# Patient Record
Sex: FEMALE | Race: OTHER
[De-identification: names, ages, dates, MRNs, and addresses within clinical notes are randomized per-mention and may not be internally consistent; named-entity substitution may affect disease eponyms.]

---

## 2018-07-18 ENCOUNTER — RECORD ABSTRACTING (OUTPATIENT)
Age: 53
End: 2018-07-18

## 2018-07-18 DIAGNOSIS — M71.20 SYNOVIAL CYST OF POPLITEAL SPACE [BAKER], UNSPECIFIED KNEE: ICD-10-CM

## 2018-07-18 DIAGNOSIS — M79.671 PAIN IN RIGHT FOOT: ICD-10-CM

## 2018-07-18 DIAGNOSIS — M51.26 OTHER INTERVERTEBRAL DISC DISPLACEMENT, LUMBAR REGION: ICD-10-CM

## 2018-07-18 DIAGNOSIS — M79.89 OTHER SPECIFIED SOFT TISSUE DISORDERS: ICD-10-CM

## 2018-07-18 DIAGNOSIS — Z82.3 FAMILY HISTORY OF STROKE: ICD-10-CM

## 2018-07-18 DIAGNOSIS — Z87.898 PERSONAL HISTORY OF OTHER SPECIFIED CONDITIONS: ICD-10-CM

## 2018-07-18 DIAGNOSIS — M51.36 OTHER INTERVERTEBRAL DISC DEGENERATION, LUMBAR REGION: ICD-10-CM

## 2018-07-18 DIAGNOSIS — S83.241A OTHER TEAR OF MEDIAL MENISCUS, CURRENT INJURY, RIGHT KNEE, INITIAL ENCOUNTER: ICD-10-CM

## 2018-07-18 DIAGNOSIS — R92.1 MAMMOGRAPHIC CALCIFICATION FOUND ON DIAGNOSTIC IMAGING OF BREAST: ICD-10-CM

## 2018-07-18 DIAGNOSIS — Z83.3 FAMILY HISTORY OF DIABETES MELLITUS: ICD-10-CM

## 2018-07-18 DIAGNOSIS — K64.8 OTHER HEMORRHOIDS: ICD-10-CM

## 2018-08-07 ENCOUNTER — APPOINTMENT (OUTPATIENT)
Dept: INTERNAL MEDICINE | Facility: CLINIC | Age: 53
End: 2018-08-07
Payer: COMMERCIAL

## 2018-08-07 ENCOUNTER — NON-APPOINTMENT (OUTPATIENT)
Age: 53
End: 2018-08-07

## 2018-08-07 VITALS
HEART RATE: 71 BPM | RESPIRATION RATE: 17 BRPM | WEIGHT: 192 LBS | TEMPERATURE: 97.9 F | HEIGHT: 65 IN | BODY MASS INDEX: 31.99 KG/M2 | DIASTOLIC BLOOD PRESSURE: 79 MMHG | SYSTOLIC BLOOD PRESSURE: 109 MMHG

## 2018-08-07 DIAGNOSIS — R73.03 PREDIABETES.: ICD-10-CM

## 2018-08-07 DIAGNOSIS — Z78.9 OTHER SPECIFIED HEALTH STATUS: ICD-10-CM

## 2018-08-07 DIAGNOSIS — R53.83 OTHER FATIGUE: ICD-10-CM

## 2018-08-07 PROCEDURE — 93000 ELECTROCARDIOGRAM COMPLETE: CPT

## 2018-08-07 PROCEDURE — 36415 COLL VENOUS BLD VENIPUNCTURE: CPT

## 2018-08-07 PROCEDURE — 99396 PREV VISIT EST AGE 40-64: CPT | Mod: 25

## 2018-08-07 NOTE — HISTORY OF PRESENT ILLNESS
[FreeTextEntry1] : Complete physical examination blood work [de-identified] : Patient presents to the office today for complete physical exam and fasting blood work\par She is feeling well overall. Her only complaint is that she feels that she is going through menopause has recently seen a GYN.\par Patient is doing very well with exercise 5 times a week and diet has been fair with 3 portion daily

## 2018-08-07 NOTE — PHYSICAL EXAM
[No Acute Distress] : no acute distress [Well Nourished] : well nourished [Normal Sclera/Conjunctiva] : normal sclera/conjunctiva [PERRL] : pupils equal round and reactive to light [EOMI] : extraocular movements intact [Normal Outer Ear/Nose] : the outer ears and nose were normal in appearance [Normal Oropharynx] : the oropharynx was normal [Normal TMs] : both tympanic membranes were normal [No JVD] : no jugular venous distention [Supple] : supple [No Lymphadenopathy] : no lymphadenopathy [No Respiratory Distress] : no respiratory distress  [Clear to Auscultation] : lungs were clear to auscultation bilaterally [No Accessory Muscle Use] : no accessory muscle use [Normal Rate] : normal rate  [Regular Rhythm] : with a regular rhythm [Normal S1, S2] : normal S1 and S2 [No Carotid Bruits] : no carotid bruits [No Varicosities] : no varicosities [Soft] : abdomen soft [Non Tender] : non-tender [Non-distended] : non-distended [No Masses] : no abdominal mass palpated [Normal Bowel Sounds] : normal bowel sounds [Normal Posterior Cervical Nodes] : no posterior cervical lymphadenopathy [Normal Anterior Cervical Nodes] : no anterior cervical lymphadenopathy [No CVA Tenderness] : no CVA  tenderness [No Joint Swelling] : no joint swelling [Grossly Normal Strength/Tone] : grossly normal strength/tone [No Rash] : no rash [Normal Gait] : normal gait [Normal Affect] : the affect was normal [Normal Insight/Judgement] : insight and judgment were intact

## 2018-08-09 LAB
25(OH)D3 SERPL-MCNC: 42.2 NG/ML
ALBUMIN SERPL ELPH-MCNC: 4.9 G/DL
ALP BLD-CCNC: 76 U/L
ALT SERPL-CCNC: 14 U/L
ANION GAP SERPL CALC-SCNC: 17 MMOL/L
APPEARANCE: CLEAR
AST SERPL-CCNC: 18 U/L
BACTERIA: NEGATIVE
BASOPHILS # BLD AUTO: 0.02 K/UL
BASOPHILS NFR BLD AUTO: 0.2 %
BILIRUB SERPL-MCNC: 0.4 MG/DL
BILIRUBIN URINE: NEGATIVE
BLOOD URINE: NEGATIVE
BUN SERPL-MCNC: 11 MG/DL
CALCIUM SERPL-MCNC: 9.6 MG/DL
CHLORIDE SERPL-SCNC: 103 MMOL/L
CHOLEST SERPL-MCNC: 177 MG/DL
CHOLEST/HDLC SERPL: 3 RATIO
CO2 SERPL-SCNC: 21 MMOL/L
COLOR: YELLOW
CREAT SERPL-MCNC: 0.7 MG/DL
EOSINOPHIL # BLD AUTO: 0.24 K/UL
EOSINOPHIL NFR BLD AUTO: 2.6 %
FERRITIN SERPL-MCNC: 138 NG/ML
GLUCOSE QUALITATIVE U: NEGATIVE MG/DL
GLUCOSE SERPL-MCNC: 96 MG/DL
HBA1C MFR BLD HPLC: 5.5 %
HCT VFR BLD CALC: 41.3 %
HDLC SERPL-MCNC: 59 MG/DL
HGB BLD-MCNC: 13.4 G/DL
HYALINE CASTS: 2 /LPF
IMM GRANULOCYTES NFR BLD AUTO: 0.3 %
IRON SATN MFR SERPL: 22 %
IRON SERPL-MCNC: 67 UG/DL
KETONES URINE: NEGATIVE
LDLC SERPL CALC-MCNC: 80 MG/DL
LEUKOCYTE ESTERASE URINE: NEGATIVE
LYMPHOCYTES # BLD AUTO: 2.26 K/UL
LYMPHOCYTES NFR BLD AUTO: 24.4 %
MAN DIFF?: NORMAL
MCHC RBC-ENTMCNC: 29.5 PG
MCHC RBC-ENTMCNC: 32.4 GM/DL
MCV RBC AUTO: 90.8 FL
MICROSCOPIC-UA: NORMAL
MONOCYTES # BLD AUTO: 0.88 K/UL
MONOCYTES NFR BLD AUTO: 9.5 %
NEUTROPHILS # BLD AUTO: 5.84 K/UL
NEUTROPHILS NFR BLD AUTO: 63 %
NITRITE URINE: NEGATIVE
PH URINE: 6
PLATELET # BLD AUTO: 326 K/UL
POTASSIUM SERPL-SCNC: 4.3 MMOL/L
PROT SERPL-MCNC: 7 G/DL
PROTEIN URINE: NEGATIVE MG/DL
RBC # BLD: 4.55 M/UL
RBC # FLD: 13.4 %
RED BLOOD CELLS URINE: 2 /HPF
SAVE SPECIMEN: NORMAL
SODIUM SERPL-SCNC: 141 MMOL/L
SPECIFIC GRAVITY URINE: 1
SQUAMOUS EPITHELIAL CELLS: 3 /HPF
TIBC SERPL-MCNC: 306 UG/DL
TRIGL SERPL-MCNC: 189 MG/DL
TSH SERPL-ACNC: 3.54 UIU/ML
UIBC SERPL-MCNC: 239 UG/DL
UROBILINOGEN URINE: NEGATIVE MG/DL
VIT B12 SERPL-MCNC: 875 PG/ML
WBC # FLD AUTO: 9.27 K/UL
WHITE BLOOD CELLS URINE: 1 /HPF

## 2019-02-18 ENCOUNTER — APPOINTMENT (OUTPATIENT)
Dept: INTERNAL MEDICINE | Facility: CLINIC | Age: 54
End: 2019-02-18
Payer: COMMERCIAL

## 2019-02-18 VITALS
HEIGHT: 65 IN | TEMPERATURE: 97.2 F | BODY MASS INDEX: 32.49 KG/M2 | DIASTOLIC BLOOD PRESSURE: 70 MMHG | WEIGHT: 195 LBS | SYSTOLIC BLOOD PRESSURE: 110 MMHG

## 2019-02-18 DIAGNOSIS — R53.81 CHRONIC FATIGUE, UNSPECIFIED: ICD-10-CM

## 2019-02-18 DIAGNOSIS — Z88.9 ALLERGY STATUS TO UNSPECIFIED DRUGS, MEDICAMENTS AND BIOLOGICAL SUBSTANCES: ICD-10-CM

## 2019-02-18 DIAGNOSIS — R53.82 CHRONIC FATIGUE, UNSPECIFIED: ICD-10-CM

## 2019-02-18 PROCEDURE — 99214 OFFICE O/P EST MOD 30 MIN: CPT | Mod: 25

## 2019-02-18 PROCEDURE — 36415 COLL VENOUS BLD VENIPUNCTURE: CPT

## 2019-02-18 NOTE — HEALTH RISK ASSESSMENT
[Patient reported PAP Smear was normal] : Patient reported PAP Smear was normal [PapSmearDate] : 08/18

## 2019-02-18 NOTE — HISTORY OF PRESENT ILLNESS
[FreeTextEntry8] : 54 y/o female present with extreme tiredness and fatigue. \par Pt has been with excessive fatigue for 2 months.  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  Plenty of fluids\par LMP was 10/18\par Pt is not under any stress no anxiety or depression feels happy just very tired all the time

## 2019-02-22 ENCOUNTER — LABORATORY RESULT (OUTPATIENT)
Age: 54
End: 2019-02-22

## 2019-02-22 ENCOUNTER — TRANSCRIPTION ENCOUNTER (OUTPATIENT)
Age: 54
End: 2019-02-22

## 2019-02-22 PROBLEM — Z88.9 MULTIPLE ALLERGIES: Status: ACTIVE | Noted: 2019-02-22

## 2019-02-22 LAB
25(OH)D3 SERPL-MCNC: 29.6 NG/ML
ALBUMIN SERPL ELPH-MCNC: 4.6 G/DL
ALP BLD-CCNC: 80 U/L
ALT SERPL-CCNC: 13 U/L
ANA SER IF-ACNC: NEGATIVE
ANION GAP SERPL CALC-SCNC: 21 MMOL/L
AST SERPL-CCNC: 15 U/L
BASOPHILS # BLD AUTO: 0.04 K/UL
BASOPHILS NFR BLD AUTO: 0.4 %
BILIRUB SERPL-MCNC: <0.2 MG/DL
BUN SERPL-MCNC: 16 MG/DL
CALCIUM SERPL-MCNC: 9.7 MG/DL
CHLORIDE SERPL-SCNC: 106 MMOL/L
CO2 SERPL-SCNC: 19 MMOL/L
CREAT SERPL-MCNC: 0.92 MG/DL
EOSINOPHIL # BLD AUTO: 0.23 K/UL
EOSINOPHIL NFR BLD AUTO: 2.3 %
FERRITIN SERPL-MCNC: 124 NG/ML
GLUCOSE SERPL-MCNC: 121 MG/DL
HBA1C MFR BLD HPLC: 5.2 %
HCT VFR BLD CALC: 39.7 %
HGB BLD-MCNC: 13.4 G/DL
IMM GRANULOCYTES NFR BLD AUTO: 0.5 %
IRON SATN MFR SERPL: 24 %
IRON SERPL-MCNC: 70 UG/DL
LYMPHOCYTES # BLD AUTO: 2.29 K/UL
LYMPHOCYTES NFR BLD AUTO: 22.8 %
MAN DIFF?: NORMAL
MCHC RBC-ENTMCNC: 29.8 PG
MCHC RBC-ENTMCNC: 33.8 GM/DL
MCV RBC AUTO: 88.4 FL
MONOCYTES # BLD AUTO: 0.92 K/UL
MONOCYTES NFR BLD AUTO: 9.2 %
NEUTROPHILS # BLD AUTO: 6.52 K/UL
NEUTROPHILS NFR BLD AUTO: 64.8 %
PLATELET # BLD AUTO: 315 K/UL
POTASSIUM SERPL-SCNC: 4.3 MMOL/L
PROT SERPL-MCNC: 6.9 G/DL
RBC # BLD: 4.49 M/UL
RBC # FLD: 12 %
SODIUM SERPL-SCNC: 146 MMOL/L
TIBC SERPL-MCNC: 288 UG/DL
TOTAL IGE SMQN RAST: 61 KU/L
TSH SERPL-ACNC: 2.86 UIU/ML
UIBC SERPL-MCNC: 218 UG/DL
VIT B12 SERPL-MCNC: 705 PG/ML
WBC # FLD AUTO: 10.05 K/UL

## 2019-02-28 ENCOUNTER — TRANSCRIPTION ENCOUNTER (OUTPATIENT)
Age: 54
End: 2019-02-28

## 2019-03-01 ENCOUNTER — TRANSCRIPTION ENCOUNTER (OUTPATIENT)
Age: 54
End: 2019-03-01

## 2019-03-12 ENCOUNTER — TRANSCRIPTION ENCOUNTER (OUTPATIENT)
Age: 54
End: 2019-03-12

## 2019-03-12 LAB
A ALTERNATA IGE QN: NORMAL
A FUMIGATUS IGE QN: <0.1 KUA/L
BERMUDA GRASS IGE QN: <0.1 KUA/L
BOXELDER IGE QN: <0.1 KUA/L
C HERBARUM IGE QN: <0.1 KUA/L
CALIF WALNUT IGE QN: NORMAL
CAT DANDER IGE QN: 0.85 KUA/L
CMN PIGWEED IGE QN: <0.1 KUA/L
COMMON RAGWEED IGE QN: <0.1 KUA/L
COTTONWOOD IGE QN: <0.1 KUA/L
D FARINAE IGE QN: <0.1 KUA/L
D PTERONYSS IGE QN: <0.1 KUA/L
DEPRECATED A ALTERNATA IGE RAST QL: NORMAL
DEPRECATED A FUMIGATUS IGE RAST QL: 0
DEPRECATED BERMUDA GRASS IGE RAST QL: 0
DEPRECATED BOXELDER IGE RAST QL: 0
DEPRECATED C HERBARUM IGE RAST QL: 0
DEPRECATED CAT DANDER IGE RAST QL: 2
DEPRECATED COMMON PIGWEED IGE RAST QL: 0
DEPRECATED COMMON RAGWEED IGE RAST QL: 0
DEPRECATED COTTONWOOD IGE RAST QL: 0
DEPRECATED D FARINAE IGE RAST QL: 0
DEPRECATED D PTERONYSS IGE RAST QL: 0
DEPRECATED DOG DANDER IGE RAST QL: NORMAL
DEPRECATED GOOSEFOOT IGE RAST QL: 0
DEPRECATED LONDON PLANE IGE RAST QL: NORMAL
DEPRECATED MUGWORT IGE RAST QL: NORMAL
DEPRECATED P NOTATUM IGE RAST QL: 0
DEPRECATED RED CEDAR IGE RAST QL: NORMAL
DEPRECATED ROACH IGE RAST QL: 0
DEPRECATED SHEEP SORREL IGE RAST QL: NORMAL
DEPRECATED SILVER BIRCH IGE RAST QL: 0
DEPRECATED TIMOTHY IGE RAST QL: NORMAL
DEPRECATED WHITE ASH IGE RAST QL: NORMAL
DEPRECATED WHITE OAK IGE RAST QL: NORMAL
DOG DANDER IGE QN: 0.22 KUA/L
GOOSEFOOT IGE QN: <0.1 KUA/L
LONDON PLANE IGE QN: NORMAL
MUGWORT IGE QN: NORMAL
MULBERRY (T70) CLASS: NORMAL
MULBERRY (T70) CONC: NORMAL
P NOTATUM IGE QN: <0.1 KUA/L
RED CEDAR IGE QN: NORMAL
ROACH IGE QN: <0.1 KUA/L
SHEEP SORREL IGE QN: NORMAL
SILVER BIRCH IGE QN: <0.1 KUA/L
TIMOTHY IGE QN: NORMAL
TREE ALLERG MIX1 IGE QL: NORMAL
WHITE ASH IGE QN: NORMAL
WHITE ELM IGE QN: 0
WHITE ELM IGE QN: <0.1 KUA/L
WHITE OAK IGE QN: NORMAL

## 2019-05-31 DIAGNOSIS — Z01.84 ENCOUNTER FOR ANTIBODY RESPONSE EXAMINATION: ICD-10-CM

## 2019-06-04 ENCOUNTER — APPOINTMENT (OUTPATIENT)
Dept: INTERNAL MEDICINE | Facility: CLINIC | Age: 54
End: 2019-06-04
Payer: COMMERCIAL

## 2019-06-04 VITALS
HEIGHT: 65 IN | BODY MASS INDEX: 31.65 KG/M2 | WEIGHT: 190 LBS | DIASTOLIC BLOOD PRESSURE: 80 MMHG | SYSTOLIC BLOOD PRESSURE: 100 MMHG | TEMPERATURE: 97.8 F

## 2019-06-04 DIAGNOSIS — Z23 ENCOUNTER FOR IMMUNIZATION: ICD-10-CM

## 2019-06-04 PROCEDURE — 99214 OFFICE O/P EST MOD 30 MIN: CPT | Mod: 25

## 2019-06-04 PROCEDURE — 90471 IMMUNIZATION ADMIN: CPT

## 2019-06-04 PROCEDURE — 90715 TDAP VACCINE 7 YRS/> IM: CPT

## 2019-06-04 PROCEDURE — 36415 COLL VENOUS BLD VENIPUNCTURE: CPT

## 2019-06-04 RX ORDER — BROMPHENIRAMINE MALEATE, PSEUDOEPHEDRINE HYDROCHLORIDE, 2; 30; 10 MG/5ML; MG/5ML; MG/5ML
30-2-10 SYRUP ORAL
Qty: 200 | Refills: 0 | Status: COMPLETED | COMMUNITY
Start: 2019-03-27

## 2019-06-04 NOTE — HISTORY OF PRESENT ILLNESS
[FreeTextEntry1] : 52 y/o female present for form completion.  [de-identified] : Pt presents to the office today for paper work consult as she is starting a new job.  She needs proof of MMR Varicella and Hep B.\par TB gold   She also is due for Tdap vaccination.\par She feels well overall no complaints

## 2019-06-05 ENCOUNTER — TRANSCRIPTION ENCOUNTER (OUTPATIENT)
Age: 54
End: 2019-06-05

## 2019-06-06 LAB
HBV SURFACE AB SER QL: REACTIVE
MEV IGG FLD QL IA: >300 AU/ML
MEV IGG+IGM SER-IMP: POSITIVE
MUV AB SER-ACNC: POSITIVE
MUV IGG SER QL IA: >300 AU/ML
RUBV IGG FLD-ACNC: 6.4 INDEX
RUBV IGG SER-IMP: POSITIVE
VZV AB TITR SER: POSITIVE
VZV IGG SER IF-ACNC: 601.2 INDEX

## 2019-06-10 ENCOUNTER — TRANSCRIPTION ENCOUNTER (OUTPATIENT)
Age: 54
End: 2019-06-10

## 2019-06-10 LAB
M TB IFN-G BLD-IMP: NEGATIVE
QUANTIFERON TB PLUS MITOGEN MINUS NIL: 1.25 IU/ML
QUANTIFERON TB PLUS NIL: 0.01 IU/ML
QUANTIFERON TB PLUS TB1 MINUS NIL: 0 IU/ML
QUANTIFERON TB PLUS TB2 MINUS NIL: 0 IU/ML

## 2019-08-26 ENCOUNTER — NON-APPOINTMENT (OUTPATIENT)
Age: 54
End: 2019-08-26

## 2019-08-26 ENCOUNTER — APPOINTMENT (OUTPATIENT)
Dept: INTERNAL MEDICINE | Facility: CLINIC | Age: 54
End: 2019-08-26
Payer: COMMERCIAL

## 2019-08-26 VITALS
WEIGHT: 197 LBS | DIASTOLIC BLOOD PRESSURE: 80 MMHG | TEMPERATURE: 97.4 F | OXYGEN SATURATION: 98 % | HEIGHT: 65 IN | SYSTOLIC BLOOD PRESSURE: 110 MMHG | BODY MASS INDEX: 32.82 KG/M2 | HEART RATE: 81 BPM

## 2019-08-26 PROCEDURE — 36415 COLL VENOUS BLD VENIPUNCTURE: CPT

## 2019-08-26 PROCEDURE — 99396 PREV VISIT EST AGE 40-64: CPT | Mod: 25

## 2019-08-26 PROCEDURE — 93000 ELECTROCARDIOGRAM COMPLETE: CPT

## 2019-08-26 RX ORDER — OSELTAMIVIR PHOSPHATE 75 MG/1
75 CAPSULE ORAL
Qty: 10 | Refills: 0 | Status: COMPLETED | COMMUNITY
Start: 2019-03-27 | End: 2019-08-26

## 2019-08-26 NOTE — HEALTH RISK ASSESSMENT
[Patient reported mammogram was normal] : Patient reported mammogram was normal [MammogramDate] : 11/23/19

## 2019-08-26 NOTE — PHYSICAL EXAM
[No Acute Distress] : no acute distress [Well Nourished] : well nourished [Well Developed] : well developed [Normal Sclera/Conjunctiva] : normal sclera/conjunctiva [Well-Appearing] : well-appearing [EOMI] : extraocular movements intact [PERRL] : pupils equal round and reactive to light [Normal Outer Ear/Nose] : the outer ears and nose were normal in appearance [No JVD] : no jugular venous distention [Normal Oropharynx] : the oropharynx was normal [Supple] : supple [No Lymphadenopathy] : no lymphadenopathy [Thyroid Normal, No Nodules] : the thyroid was normal and there were no nodules present [No Respiratory Distress] : no respiratory distress  [Clear to Auscultation] : lungs were clear to auscultation bilaterally [No Accessory Muscle Use] : no accessory muscle use [Regular Rhythm] : with a regular rhythm [Normal Rate] : normal rate  [No Murmur] : no murmur heard [Normal S1, S2] : normal S1 and S2 [No Carotid Bruits] : no carotid bruits [No Abdominal Bruit] : a ~M bruit was not heard ~T in the abdomen [Pedal Pulses Present] : the pedal pulses are present [No Varicosities] : no varicosities [No Edema] : there was no peripheral edema [No Palpable Aorta] : no palpable aorta [No Extremity Clubbing/Cyanosis] : no extremity clubbing/cyanosis [Soft] : abdomen soft [Non Tender] : non-tender [Non-distended] : non-distended [No HSM] : no HSM [No Masses] : no abdominal mass palpated [Normal Posterior Cervical Nodes] : no posterior cervical lymphadenopathy [Normal Bowel Sounds] : normal bowel sounds [Normal Anterior Cervical Nodes] : no anterior cervical lymphadenopathy [No CVA Tenderness] : no CVA  tenderness [No Joint Swelling] : no joint swelling [No Spinal Tenderness] : no spinal tenderness [Grossly Normal Strength/Tone] : grossly normal strength/tone [Coordination Grossly Intact] : coordination grossly intact [No Rash] : no rash [No Focal Deficits] : no focal deficits [Normal Gait] : normal gait [Deep Tendon Reflexes (DTR)] : deep tendon reflexes were 2+ and symmetric [Normal Insight/Judgement] : insight and judgment were intact [Normal Affect] : the affect was normal

## 2019-08-26 NOTE — PLAN
[FreeTextEntry1] : FBW done today in office\par EKG done no changes from prior EKG SR @ 75 BPM non specific ST changes \par Further improve diet and continue exercise

## 2019-08-26 NOTE — HISTORY OF PRESENT ILLNESS
[FreeTextEntry1] : 52 y/o female present for a physical exam.  [de-identified] : Pt presents to the office today for CPE and FBW\par She has been feeling well overall\par She is with heel pain on the left that she has been seeing ortho for\par Diet has been fair\par Exercise doing well 5 days a week.

## 2019-08-27 ENCOUNTER — TRANSCRIPTION ENCOUNTER (OUTPATIENT)
Age: 54
End: 2019-08-27

## 2019-08-30 ENCOUNTER — TRANSCRIPTION ENCOUNTER (OUTPATIENT)
Age: 54
End: 2019-08-30

## 2019-08-30 DIAGNOSIS — R82.90 UNSPECIFIED ABNORMAL FINDINGS IN URINE: ICD-10-CM

## 2019-08-30 LAB
ALBUMIN SERPL ELPH-MCNC: 4.9 G/DL
ALP BLD-CCNC: 73 U/L
ALT SERPL-CCNC: 18 U/L
ANION GAP SERPL CALC-SCNC: 14 MMOL/L
APPEARANCE: ABNORMAL
AST SERPL-CCNC: 16 U/L
BACTERIA: NEGATIVE
BASOPHILS # BLD AUTO: 0.04 K/UL
BASOPHILS NFR BLD AUTO: 0.4 %
BILIRUB SERPL-MCNC: 0.4 MG/DL
BILIRUBIN URINE: NEGATIVE
BLOOD URINE: NEGATIVE
BUN SERPL-MCNC: 12 MG/DL
CALCIUM SERPL-MCNC: 9.6 MG/DL
CHLORIDE SERPL-SCNC: 102 MMOL/L
CHOLEST SERPL-MCNC: 187 MG/DL
CHOLEST/HDLC SERPL: 3.1 RATIO
CO2 SERPL-SCNC: 22 MMOL/L
COLOR: NORMAL
CREAT SERPL-MCNC: 0.74 MG/DL
EOSINOPHIL # BLD AUTO: 0.2 K/UL
EOSINOPHIL NFR BLD AUTO: 2.2 %
ESTIMATED AVERAGE GLUCOSE: 111 MG/DL
GLUCOSE QUALITATIVE U: NEGATIVE
GLUCOSE SERPL-MCNC: 93 MG/DL
HBA1C MFR BLD HPLC: 5.5 %
HCT VFR BLD CALC: 40.9 %
HDLC SERPL-MCNC: 61 MG/DL
HGB BLD-MCNC: 13.4 G/DL
HYALINE CASTS: 0 /LPF
IMM GRANULOCYTES NFR BLD AUTO: 0.2 %
KETONES URINE: NEGATIVE
LDLC SERPL CALC-MCNC: 97 MG/DL
LEUKOCYTE ESTERASE URINE: ABNORMAL
LYMPHOCYTES # BLD AUTO: 2.22 K/UL
LYMPHOCYTES NFR BLD AUTO: 24 %
MAN DIFF?: NORMAL
MCHC RBC-ENTMCNC: 29.3 PG
MCHC RBC-ENTMCNC: 32.8 GM/DL
MCV RBC AUTO: 89.5 FL
MICROSCOPIC-UA: NORMAL
MONOCYTES # BLD AUTO: 0.9 K/UL
MONOCYTES NFR BLD AUTO: 9.7 %
NEUTROPHILS # BLD AUTO: 5.88 K/UL
NEUTROPHILS NFR BLD AUTO: 63.5 %
NITRITE URINE: NEGATIVE
PH URINE: 5.5
PLATELET # BLD AUTO: 320 K/UL
POTASSIUM SERPL-SCNC: 4.1 MMOL/L
PROT SERPL-MCNC: 7.2 G/DL
PROTEIN URINE: NEGATIVE
RBC # BLD: 4.57 M/UL
RBC # FLD: 12.2 %
RED BLOOD CELLS URINE: 2 /HPF
SODIUM SERPL-SCNC: 138 MMOL/L
SPECIFIC GRAVITY URINE: 1.01
SQUAMOUS EPITHELIAL CELLS: 9 /HPF
TRIGL SERPL-MCNC: 143 MG/DL
TSH SERPL-ACNC: 3.02 UIU/ML
URINE COMMENTS: NORMAL
UROBILINOGEN URINE: NORMAL
WBC # FLD AUTO: 9.26 K/UL
WHITE BLOOD CELLS URINE: 12 /HPF

## 2019-10-23 ENCOUNTER — APPOINTMENT (OUTPATIENT)
Dept: ORTHOPEDIC SURGERY | Facility: CLINIC | Age: 54
End: 2019-10-23
Payer: COMMERCIAL

## 2019-10-23 PROCEDURE — 99204 OFFICE O/P NEW MOD 45 MIN: CPT

## 2019-10-23 NOTE — ADDENDUM
[FreeTextEntry1] : I, Dariusz Ai, acted solely as a scribe for Dr. Ezekiel Yoo on this date 10/23/2019 .\par All medical record entries made by the Scribe were at my, Dr. Ezekiel Yoo, direction and personally dictated by me on 10/23/2019 . I have reviewed the chart and agree that the record accurately reflects my personal performance of the history, physical exam, assessment and plan. I have also personally directed, reviewed, and agreed with the chart.\par \par \par

## 2019-10-23 NOTE — DISCUSSION/SUMMARY
[de-identified] : Today I had a lengthy discussion with the patient regarding their left foot pain.I have addressed all the patient's concerns surrounding the pathology of their condition. I advised the patient to continue physical therapy and stretch. A discussion was had about a possible PRP injection. I would like to see the patient back in the office PRN to reassess their condition. The patient understood and verbally agreed to the treatment plan. All of their questions were answered and they were satisfied with the visit. The patient should call the office if they have any questions or experience worsening symptoms.\par \par \par

## 2019-10-23 NOTE — PHYSICAL EXAM
[de-identified] : General: Alert and oriented x3. In no acute distress. Pleasant in nature with a normal affect. No apparent respiratory distress.\par \par L Foot Exam\par Skin: Clean, dry, intact\par Inspection: No obvious malalignment, no masses, no swelling, no effusion\par Pulses: 2+ DP/PT pulses\par ROM: FOOT Full ROM of digits, ANKLE 10 degrees of dorsiflexion, 40 degrees of plantarflexion, 10 degrees of subtalar motion.\par Painful ROM: None\par Tenderness: +distal Achilles pain, +insertional Achilles pain. No tenderness over the medial malleolus, No tenderness over the lateral malleolus, no CFL/ATFL/PTFL pain, no deltoid ligament pain. No heel pain. No Achilles tenderness. No 5th metatarsal pain. No pain to the LisFranc joint. No ttp over the posterior tibial tendon.\par Stability: Negative anterior/posterior drawer.\par Strength: 5/5 ADD/ABD/TA/GS/EHL/FHL/EDL\par Neuro: Sensation in tact to light touch throughout\par Additional tests: Negative Mortons test, negative tarsal tunnel tinels, negative single heel rise.  [de-identified] : An MRI was obtained of left ankle from an outside facility on 10/7/19 and reviewed by me today 10/23/2019  in the office. Revealed: \par 1. Moderate diffuse Achilles tendinopathy with bursitis and mild insertional marrow edema distally without tendon discontinuity.\par 2. Moderate grade partial tearing of the peroneal brevis tendon over an area measuring 3 to 4 cm posterior and inferior to the lateral malleolus with moderate tenosynovitis.\par 3. Peroneal longus tendinopathy/tenosynovitis and anterior tibialis tendinopathy and tenosynovitis.\par 4. Proximal plantar fascial thickening without tear.

## 2019-10-23 NOTE — CONSULT LETTER
[Consult Letter:] : I had the pleasure of evaluating your patient, [unfilled]. [Please see my note below.] : Please see my note below. [Consult Closing:] : Thank you very much for allowing me to participate in the care of this patient.  If you have any questions, please do not hesitate to contact me. [Sincerely,] : Sincerely, [FreeTextEntry3] : Ezekiel Yoo, DO\par Foot and Ankle Surgery\par

## 2019-12-02 ENCOUNTER — OTHER (OUTPATIENT)
Age: 54
End: 2019-12-02

## 2020-04-23 ENCOUNTER — APPOINTMENT (OUTPATIENT)
Dept: INTERNAL MEDICINE | Facility: CLINIC | Age: 55
End: 2020-04-23
Payer: COMMERCIAL

## 2020-04-23 DIAGNOSIS — B02.9 ZOSTER W/OUT COMPLICATIONS: ICD-10-CM

## 2020-04-23 PROCEDURE — 99213 OFFICE O/P EST LOW 20 MIN: CPT | Mod: 95

## 2020-04-23 RX ORDER — DOXYCYCLINE 100 MG/1
100 CAPSULE ORAL
Qty: 14 | Refills: 0 | Status: DISCONTINUED | COMMUNITY
Start: 2019-11-20

## 2020-04-23 RX ORDER — FLUTICASONE PROPIONATE 50 UG/1
50 SPRAY, METERED NASAL
Qty: 16 | Refills: 0 | Status: DISCONTINUED | COMMUNITY
Start: 2019-11-20

## 2020-04-23 NOTE — HISTORY OF PRESENT ILLNESS
[Home] : at home, [unfilled] , at the time of the visit. [Medical Office: ([facility] [place])___] : at the medical office located in  [Patient] : the patient [Self] : self [Spouse] : spouse [FreeTextEntry2] : Paula Adams [FreeTextEntry8] : Patient comes for an acute telehealth visit. \par \par She is here for evaluation of new rash on right lower back x 2 days. First noticed on Tuesday. Area is not itchy but is mildly uncomfortable. The rash is small and has not spread. She has associated numbness in area. She has pain in RLQ but no rash in area. She denies fever, N/V, diarrhea, or change in appetite. She is concerned of appendicitis. No history of herpes zoster. \par

## 2020-04-23 NOTE — PLAN
[FreeTextEntry1] : Unclear if rash is possibly due to HSV or herpes zoster.\par Will cover for both with Valtrex 1 g TID x 1 week.\par Start Gabapentin 300 mg at bedtime, discussed risk of sedation with medication.\par Monitor size of rash for now. She will keep me updated through patient portal.\par She would be good candidate for Shingrix once rash resolves.\par Call office PRN.

## 2020-04-23 NOTE — PHYSICAL EXAM
[No Acute Distress] : no acute distress [Well Developed] : well developed [Well-Appearing] : well-appearing [No Respiratory Distress] : no respiratory distress  [de-identified] : friendly [de-identified] : small area of erythematous grouped papular-appearing rash on right lower back

## 2020-04-30 RX ORDER — GABAPENTIN 300 MG/1
300 CAPSULE ORAL
Qty: 30 | Refills: 1 | Status: DISCONTINUED | COMMUNITY
Start: 2020-04-23 | End: 2020-04-30

## 2020-07-05 ENCOUNTER — LABORATORY RESULT (OUTPATIENT)
Age: 55
End: 2020-07-05

## 2020-07-06 ENCOUNTER — APPOINTMENT (OUTPATIENT)
Dept: GASTROENTEROLOGY | Facility: CLINIC | Age: 55
End: 2020-07-06
Payer: COMMERCIAL

## 2020-07-06 VITALS
OXYGEN SATURATION: 96 % | SYSTOLIC BLOOD PRESSURE: 114 MMHG | BODY MASS INDEX: 31.49 KG/M2 | WEIGHT: 189 LBS | DIASTOLIC BLOOD PRESSURE: 65 MMHG | HEART RATE: 81 BPM | HEIGHT: 65 IN

## 2020-07-06 DIAGNOSIS — R11.0 NAUSEA: ICD-10-CM

## 2020-07-06 PROCEDURE — 99203 OFFICE O/P NEW LOW 30 MIN: CPT

## 2020-07-06 RX ORDER — GINGER ROOT/GINGER ROOT EXT 262.5 MG
CAPSULE ORAL
Refills: 0 | Status: DISCONTINUED | COMMUNITY
End: 2020-07-06

## 2020-07-06 RX ORDER — DICLOFENAC SODIUM 75 MG/1
75 TABLET, DELAYED RELEASE ORAL
Qty: 60 | Refills: 0 | Status: DISCONTINUED | COMMUNITY
Start: 2020-02-28 | End: 2020-07-06

## 2020-07-06 RX ORDER — DICLOFENAC SODIUM 10 MG/G
1 GEL TOPICAL
Qty: 100 | Refills: 0 | Status: DISCONTINUED | COMMUNITY
Start: 2020-02-28 | End: 2020-07-06

## 2020-07-06 RX ORDER — VALACYCLOVIR 1 G/1
1 TABLET, FILM COATED ORAL 3 TIMES DAILY
Qty: 21 | Refills: 0 | Status: DISCONTINUED | COMMUNITY
Start: 2020-04-23 | End: 2020-07-06

## 2020-07-06 RX ORDER — TURMERIC/TURMERIC EXT/PEPR EXT 900-100 MG
CAPSULE ORAL
Refills: 0 | Status: DISCONTINUED | COMMUNITY
End: 2020-07-06

## 2020-07-06 RX ORDER — TRIAMCINOLONE ACETONIDE 1 MG/G
0.1 CREAM TOPICAL TWICE DAILY
Qty: 1 | Refills: 0 | Status: DISCONTINUED | COMMUNITY
Start: 2020-04-24 | End: 2020-07-06

## 2020-07-06 RX ORDER — ADHESIVE TAPE 3"X 2.3 YD
50 MCG TAPE, NON-MEDICATED TOPICAL DAILY
Refills: 0 | Status: DISCONTINUED | COMMUNITY
Start: 2018-07-18 | End: 2020-07-06

## 2020-07-06 NOTE — PHYSICAL EXAM
[General Appearance - Well Developed] : well developed [Sclera] : the sclera and conjunctiva were normal [General Appearance - Well Nourished] : well nourished [Neck Appearance] : the appearance of the neck was normal [Respiration, Rhythm And Depth] : normal respiratory rhythm and effort [Auscultation Breath Sounds / Voice Sounds] : lungs were clear to auscultation bilaterally [Apical Impulse] : the apical impulse was normal [Heart Rate And Rhythm] : heart rate was normal and rhythm regular [Heart Sounds] : normal S1 and S2 [Bowel Sounds] : normal bowel sounds [Abdomen Soft] : soft [Abdomen Mass (___ Cm)] : no abdominal mass palpated [Abdomen Tenderness] : non-tender [] : no hepato-splenomegaly [Cervical Lymph Nodes Enlarged Posterior Bilaterally] : posterior cervical [Cervical Lymph Nodes Enlarged Anterior Bilaterally] : anterior cervical [Supraclavicular Lymph Nodes Enlarged Bilaterally] : supraclavicular [Skin Color & Pigmentation] : normal skin color and pigmentation [Musculoskeletal - Swelling] : no joint swelling seen [Oriented To Time, Place, And Person] : oriented to person, place, and time [Impaired Insight] : insight and judgment were intact [Nasal Cavity] : the nasal mucosa and septum were normal [Thyroid Diffuse Enlargement] : the thyroid was not enlarged [Oropharynx] : the oropharynx was normal [Murmurs] : no murmurs

## 2020-07-07 ENCOUNTER — NON-APPOINTMENT (OUTPATIENT)
Age: 55
End: 2020-07-07

## 2020-07-09 LAB
BACTERIA STL CULT: NORMAL
C DIFF TOXIN B QL PCR REFLEX: NORMAL
GDH ANTIGEN: DETECTED
TOXIN A AND B: NOT DETECTED

## 2020-07-13 ENCOUNTER — TRANSCRIPTION ENCOUNTER (OUTPATIENT)
Age: 55
End: 2020-07-13

## 2020-07-24 DIAGNOSIS — Z01.818 ENCOUNTER FOR OTHER PREPROCEDURAL EXAMINATION: ICD-10-CM

## 2020-07-26 ENCOUNTER — APPOINTMENT (OUTPATIENT)
Dept: DISASTER EMERGENCY | Facility: CLINIC | Age: 55
End: 2020-07-26

## 2020-07-27 LAB — SARS-COV-2 N GENE NPH QL NAA+PROBE: NOT DETECTED

## 2020-07-29 ENCOUNTER — RESULT REVIEW (OUTPATIENT)
Age: 55
End: 2020-07-29

## 2020-07-29 ENCOUNTER — APPOINTMENT (OUTPATIENT)
Dept: GASTROENTEROLOGY | Facility: AMBULATORY MEDICAL SERVICES | Age: 55
End: 2020-07-29
Payer: COMMERCIAL

## 2020-07-29 PROCEDURE — 45380 COLONOSCOPY AND BIOPSY: CPT

## 2020-07-29 NOTE — REVIEW OF SYSTEMS
[As Noted in HPI] : as noted in HPI [Feeling Poorly] : feeling poorly [Feeling Tired] : feeling tired [Diarrhea] : diarrhea [Negative] : Neurological [Chills] : no chills [Fever] : no fever

## 2020-07-29 NOTE — HISTORY OF PRESENT ILLNESS
[Ordering Test(s) ___] : ordering [unfilled] [None] : had no significant interval events [Heartburn] : denies heartburn [Nausea] : improved nausea [Vomiting] : resolved vomiting [Diarrhea] : diarrhea worsened [Constipation] : denies constipation [Yellow Skin Or Eyes (Jaundice)] : denies jaundice [Abdominal Pain] : improved abdominal pain [Abdominal Swelling] : abdominal swelling resolved [Rectal Pain] : denies rectal pain [GERD] : gastroesophageal reflux disease [de-identified] : 5 years ago [de-identified] : Paula is a pleasant 54 year old female with diarrhea. Acute onset of symptoms about 2 weeks ago. no fevers. no sick contacts. She has been exclusively home with her  and sons. has not eaten out, traveled or even leave her home. no one else has been sick. She did start "Green" shakes over 4 weeks ago which contain tumeric. no other meds or supplements. She does take occasional NSAIDs (Ibuprofen about once a month for pain). no prior diarrhea or GI illness. normal colonoscopy about 5 years ago. reports 2 days of nausea and bloating, followed by explosive diarrhea. this has been followed by watery diarrhea 7 to 9 times a day. starts shortly after she wakes up. worse after eating. no change in diet. No bleeding. no weight loss. has been hydrating and following a BRAT diet. Diffuse abdominal pain and intense bloating has not recurred but she does have lower abdominal cramping pain preceding BMs. [de-identified] : PBC\par  [de-identified] : dietary modifications and OTC remedies (she obtained PeptoBismol and asks for advise on use)

## 2020-07-31 ENCOUNTER — NON-APPOINTMENT (OUTPATIENT)
Age: 55
End: 2020-07-31

## 2020-08-11 ENCOUNTER — APPOINTMENT (OUTPATIENT)
Dept: GASTROENTEROLOGY | Facility: CLINIC | Age: 55
End: 2020-08-11
Payer: COMMERCIAL

## 2020-08-11 VITALS
OXYGEN SATURATION: 98 % | HEIGHT: 65 IN | BODY MASS INDEX: 31.49 KG/M2 | HEART RATE: 95 BPM | DIASTOLIC BLOOD PRESSURE: 87 MMHG | SYSTOLIC BLOOD PRESSURE: 151 MMHG | WEIGHT: 189 LBS

## 2020-08-11 DIAGNOSIS — R10.9 UNSPECIFIED ABDOMINAL PAIN: ICD-10-CM

## 2020-08-11 PROCEDURE — 99215 OFFICE O/P EST HI 40 MIN: CPT

## 2020-08-11 NOTE — HISTORY OF PRESENT ILLNESS
[de-identified] : Juanis presents for follow up for diarrhea. She was here about 5 weeks ago and has since had stool testing and colonoscopy. \par July 6, 2020:\par Paula is a pleasant 54 year old female with diarrhea. Acute onset of symptoms about 2 weeks ago. no fevers. no sick contacts. She has been exclusively home with her  and sons. has not eaten out, traveled or even leave her home. no one else has been sick. She did start "Green" shakes over 4 weeks ago which contain tumeric. no other meds or supplements. She does take occasional NSAIDs (Ibuprofen about once a month for pain). no prior diarrhea or GI illness. normal colonoscopy about 5 years ago. reports 2 days of nausea and bloating, followed by explosive diarrhea. this has been followed by watery diarrhea 7 to 9 times a day. starts shortly after she wakes up. worse after eating. no change in diet. No bleeding. no weight loss. has been hydrating and following a BRAT diet. Diffuse abdominal pain and intense bloating has not recurred but she does have lower abdominal cramping pain preceding BMs. \par Symptoms: denies heartburn, improved nausea, resolved vomiting, diarrhea worsened, denies constipation, denies jaundice, improved abdominal pain, abdominal swelling resolved and denies rectal pain. \par \par Juanis reports that for the past 2 weeks, she has had less fecal urgency and stool is more formed. she has not resumed her normal bowel routine (one formed BM every morning). she has stopped using NSAIDs and now will only take Tylenol for joint pains or headaches. chronic foot pain, gets cortisone injections, last one 10/19. colonoscopy with random biopsies was normal (negative for microscopic colitis). She reports that her weight is stable and she has maintained good hydration.

## 2020-08-11 NOTE — PHYSICAL EXAM
[General Appearance - Well Nourished] : well nourished [General Appearance - Well Developed] : well developed [Heart Rate And Rhythm] : heart rate was normal and rhythm regular [] : no respiratory distress [Sclera] : the sclera and conjunctiva were normal [Bowel Sounds] : normal bowel sounds [Heart Sounds] : normal S1 and S2 [Abdomen Soft] : soft [Abdomen Tenderness] : non-tender

## 2020-08-11 NOTE — ASSESSMENT
[FreeTextEntry1] : I discussed with Paula in detail the results of her tests as well as her symptoms. 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 She has ongoing bloating and soft stool but overall is better. she is advised on diet changes to help with her symptoms and to avoid supplements/ vitamins that will exacerbate symptoms. She will complete Celiac serology with her next set of blood work at her CPE and follow up with me if any ongoing or recurrent symptoms.

## 2020-08-30 ENCOUNTER — RESULT CHARGE (OUTPATIENT)
Age: 55
End: 2020-08-30

## 2020-08-31 ENCOUNTER — APPOINTMENT (OUTPATIENT)
Dept: INTERNAL MEDICINE | Facility: CLINIC | Age: 55
End: 2020-08-31
Payer: COMMERCIAL

## 2020-08-31 ENCOUNTER — LABORATORY RESULT (OUTPATIENT)
Age: 55
End: 2020-08-31

## 2020-08-31 ENCOUNTER — NON-APPOINTMENT (OUTPATIENT)
Age: 55
End: 2020-08-31

## 2020-08-31 VITALS
SYSTOLIC BLOOD PRESSURE: 134 MMHG | HEART RATE: 87 BPM | DIASTOLIC BLOOD PRESSURE: 70 MMHG | TEMPERATURE: 98.7 F | OXYGEN SATURATION: 98 % | HEIGHT: 65 IN | BODY MASS INDEX: 31.99 KG/M2 | RESPIRATION RATE: 18 BRPM | WEIGHT: 192 LBS

## 2020-08-31 DIAGNOSIS — Z11.1 ENCOUNTER FOR SCREENING FOR RESPIRATORY TUBERCULOSIS: ICD-10-CM

## 2020-08-31 PROCEDURE — 99396 PREV VISIT EST AGE 40-64: CPT | Mod: 25

## 2020-08-31 PROCEDURE — 36415 COLL VENOUS BLD VENIPUNCTURE: CPT

## 2020-08-31 PROCEDURE — 93000 ELECTROCARDIOGRAM COMPLETE: CPT

## 2020-08-31 RX ORDER — SODIUM SULFATE, POTASSIUM SULFATE, MAGNESIUM SULFATE 17.5; 3.13; 1.6 G/ML; G/ML; G/ML
17.5-3.13-1.6 SOLUTION, CONCENTRATE ORAL
Qty: 1 | Refills: 0 | Status: COMPLETED | COMMUNITY
Start: 2020-07-14 | End: 2020-08-31

## 2020-08-31 NOTE — HISTORY OF PRESENT ILLNESS
[FreeTextEntry1] : 53 y/o female present for Annual physical exam  [de-identified] : Patient presents the office today for complete physical exam and fasting blood work\par Patient has been seeing GI as she is having issues with diarrhea\par Diarrhea has currently subsided\par Diet and exercise are fair

## 2020-08-31 NOTE — PLAN
[FreeTextEntry1] : Fasting blood work done in office today\par EKG sinus rhythm at 79 bpm no changes from prior EKG\par Patient refused influenza vaccination

## 2020-09-08 ENCOUNTER — TRANSCRIPTION ENCOUNTER (OUTPATIENT)
Age: 55
End: 2020-09-08

## 2020-09-08 LAB
25(OH)D3 SERPL-MCNC: 38.1 NG/ML
ALBUMIN SERPL ELPH-MCNC: 5.1 G/DL
ALP BLD-CCNC: 83 U/L
ALT SERPL-CCNC: 19 U/L
ANION GAP SERPL CALC-SCNC: 15 MMOL/L
APPEARANCE: ABNORMAL
AST SERPL-CCNC: 23 U/L
BACTERIA: NEGATIVE
BASOPHILS # BLD AUTO: 0.04 K/UL
BASOPHILS NFR BLD AUTO: 0.4 %
BILIRUB SERPL-MCNC: 0.5 MG/DL
BILIRUBIN URINE: NEGATIVE
BLOOD URINE: NEGATIVE
BUN SERPL-MCNC: 12 MG/DL
CALCIUM SERPL-MCNC: 9.7 MG/DL
CHLORIDE SERPL-SCNC: 102 MMOL/L
CHOLEST SERPL-MCNC: 238 MG/DL
CHOLEST/HDLC SERPL: 3.4 RATIO
CO2 SERPL-SCNC: 21 MMOL/L
COLOR: NORMAL
CREAT SERPL-MCNC: 0.75 MG/DL
EOSINOPHIL # BLD AUTO: 0.18 K/UL
EOSINOPHIL NFR BLD AUTO: 2 %
ESTIMATED AVERAGE GLUCOSE: 105 MG/DL
FERRITIN SERPL-MCNC: 207 NG/ML
FOLATE SERPL-MCNC: >20 NG/ML
GLUCOSE QUALITATIVE U: NEGATIVE
GLUCOSE SERPL-MCNC: 94 MG/DL
HBA1C MFR BLD HPLC: 5.3 %
HCT VFR BLD CALC: 44.4 %
HDLC SERPL-MCNC: 70 MG/DL
HGB BLD-MCNC: 14.1 G/DL
HYALINE CASTS: 3 /LPF
IMM GRANULOCYTES NFR BLD AUTO: 0.4 %
IRON SATN MFR SERPL: 24 %
IRON SERPL-MCNC: 80 UG/DL
KETONES URINE: NEGATIVE
LDLC SERPL CALC-MCNC: 134 MG/DL
LEUKOCYTE ESTERASE URINE: ABNORMAL
LYMPHOCYTES # BLD AUTO: 2.02 K/UL
LYMPHOCYTES NFR BLD AUTO: 21.9 %
M TB IFN-G BLD-IMP: NEGATIVE
MAN DIFF?: NORMAL
MCHC RBC-ENTMCNC: 29.5 PG
MCHC RBC-ENTMCNC: 31.8 GM/DL
MCV RBC AUTO: 92.9 FL
MICROSCOPIC-UA: NORMAL
MONOCYTES # BLD AUTO: 0.7 K/UL
MONOCYTES NFR BLD AUTO: 7.6 %
NEUTROPHILS # BLD AUTO: 6.23 K/UL
NEUTROPHILS NFR BLD AUTO: 67.7 %
NITRITE URINE: NEGATIVE
PH URINE: 5.5
PLATELET # BLD AUTO: 323 K/UL
POTASSIUM SERPL-SCNC: 4.2 MMOL/L
PROT SERPL-MCNC: 7.5 G/DL
PROTEIN URINE: NEGATIVE
QUANTIFERON TB PLUS MITOGEN MINUS NIL: 7.83 IU/ML
QUANTIFERON TB PLUS NIL: 0.01 IU/ML
QUANTIFERON TB PLUS TB1 MINUS NIL: 0 IU/ML
QUANTIFERON TB PLUS TB2 MINUS NIL: 0 IU/ML
RBC # BLD: 4.78 M/UL
RBC # FLD: 11.9 %
RED BLOOD CELLS URINE: 2 /HPF
SODIUM SERPL-SCNC: 138 MMOL/L
SPECIFIC GRAVITY URINE: 1.01
SQUAMOUS EPITHELIAL CELLS: 15 /HPF
T3FREE SERPL-MCNC: 3.08 PG/ML
T4 FREE SERPL-MCNC: 1.3 NG/DL
THYROGLOB AB SERPL-ACNC: <20 IU/ML
THYROPEROXIDASE AB SERPL IA-ACNC: <10 IU/ML
TIBC SERPL-MCNC: 337 UG/DL
TRIGL SERPL-MCNC: 170 MG/DL
TSH SERPL-ACNC: 2.25 UIU/ML
UIBC SERPL-MCNC: 257 UG/DL
UROBILINOGEN URINE: NORMAL
VIT B12 SERPL-MCNC: 681 PG/ML
WBC # FLD AUTO: 9.21 K/UL
WHITE BLOOD CELLS URINE: 3 /HPF

## 2021-06-03 ENCOUNTER — NON-APPOINTMENT (OUTPATIENT)
Age: 56
End: 2021-06-03

## 2021-07-01 ENCOUNTER — LABORATORY RESULT (OUTPATIENT)
Age: 56
End: 2021-07-01

## 2021-07-01 ENCOUNTER — APPOINTMENT (OUTPATIENT)
Dept: INTERNAL MEDICINE | Facility: CLINIC | Age: 56
End: 2021-07-01
Payer: COMMERCIAL

## 2021-07-01 ENCOUNTER — NON-APPOINTMENT (OUTPATIENT)
Age: 56
End: 2021-07-01

## 2021-07-01 VITALS
TEMPERATURE: 98 F | HEIGHT: 65 IN | WEIGHT: 182 LBS | SYSTOLIC BLOOD PRESSURE: 120 MMHG | HEART RATE: 87 BPM | BODY MASS INDEX: 30.32 KG/M2 | OXYGEN SATURATION: 98 % | DIASTOLIC BLOOD PRESSURE: 80 MMHG

## 2021-07-01 DIAGNOSIS — L30.9 DERMATITIS, UNSPECIFIED: ICD-10-CM

## 2021-07-01 DIAGNOSIS — Z01.84 ENCOUNTER FOR ANTIBODY RESPONSE EXAMINATION: ICD-10-CM

## 2021-07-01 PROCEDURE — 93000 ELECTROCARDIOGRAM COMPLETE: CPT

## 2021-07-01 PROCEDURE — 99396 PREV VISIT EST AGE 40-64: CPT | Mod: 25

## 2021-07-01 PROCEDURE — 99072 ADDL SUPL MATRL&STAF TM PHE: CPT

## 2021-07-01 PROCEDURE — 36415 COLL VENOUS BLD VENIPUNCTURE: CPT

## 2021-07-01 NOTE — PHYSICAL EXAM
[No Acute Distress] : no acute distress [Well Nourished] : well nourished [Well Developed] : well developed [Well-Appearing] : well-appearing [Normal Sclera/Conjunctiva] : normal sclera/conjunctiva [PERRL] : pupils equal round and reactive to light [Normal Outer Ear/Nose] : the outer ears and nose were normal in appearance [EOMI] : extraocular movements intact [Normal Oropharynx] : the oropharynx was normal [No JVD] : no jugular venous distention [No Lymphadenopathy] : no lymphadenopathy [Supple] : supple [No Respiratory Distress] : no respiratory distress  [Thyroid Normal, No Nodules] : the thyroid was normal and there were no nodules present [No Accessory Muscle Use] : no accessory muscle use [Clear to Auscultation] : lungs were clear to auscultation bilaterally [Normal Rate] : normal rate  [Regular Rhythm] : with a regular rhythm [No Murmur] : no murmur heard [Normal S1, S2] : normal S1 and S2 [No Carotid Bruits] : no carotid bruits [No Abdominal Bruit] : a ~M bruit was not heard ~T in the abdomen [No Varicosities] : no varicosities [Pedal Pulses Present] : the pedal pulses are present [No Edema] : there was no peripheral edema [No Palpable Aorta] : no palpable aorta [No Extremity Clubbing/Cyanosis] : no extremity clubbing/cyanosis [Soft] : abdomen soft [Non Tender] : non-tender [Non-distended] : non-distended [No Masses] : no abdominal mass palpated [No HSM] : no HSM [Normal Bowel Sounds] : normal bowel sounds [Normal Posterior Cervical Nodes] : no posterior cervical lymphadenopathy [Normal Anterior Cervical Nodes] : no anterior cervical lymphadenopathy [No CVA Tenderness] : no CVA  tenderness [No Spinal Tenderness] : no spinal tenderness [No Joint Swelling] : no joint swelling [Grossly Normal Strength/Tone] : grossly normal strength/tone [Coordination Grossly Intact] : coordination grossly intact [No Focal Deficits] : no focal deficits [Normal Gait] : normal gait [Normal Affect] : the affect was normal [Normal Insight/Judgement] : insight and judgment were intact [de-identified] : Right foot.  Mild erythematous raised rash with dryness

## 2021-07-01 NOTE — PLAN
[FreeTextEntry1] : Further improvement with diet and exercise needed\par EKG NSR @ 84 BPM no changes from prior EKG\par FBW done in office today \par Will request pts mammo results \par Paper work filled out for pts job

## 2021-07-01 NOTE — HISTORY OF PRESENT ILLNESS
[FreeTextEntry1] : 56 y/o female presents in the office for a complete physical exam / fasting. [de-identified] : Pt presents to the office today for CPE and FBW\par She has been feeling well overall with no complaints \par Diet has been fair\par She has recently seen her GYN\par

## 2021-07-07 ENCOUNTER — TRANSCRIPTION ENCOUNTER (OUTPATIENT)
Age: 56
End: 2021-07-07

## 2021-07-07 LAB
25(OH)D3 SERPL-MCNC: 79.1 NG/ML
ALBUMIN SERPL ELPH-MCNC: 5.1 G/DL
ALP BLD-CCNC: 77 U/L
ALT SERPL-CCNC: 21 U/L
ANION GAP SERPL CALC-SCNC: 15 MMOL/L
APPEARANCE: CLEAR
AST SERPL-CCNC: 20 U/L
BACTERIA: NEGATIVE
BASOPHILS # BLD AUTO: 0.04 K/UL
BASOPHILS NFR BLD AUTO: 0.6 %
BILIRUB SERPL-MCNC: 0.5 MG/DL
BILIRUBIN URINE: NEGATIVE
BLOOD URINE: NEGATIVE
BUN SERPL-MCNC: 14 MG/DL
CALCIUM SERPL-MCNC: 10.3 MG/DL
CHLORIDE SERPL-SCNC: 103 MMOL/L
CHOLEST SERPL-MCNC: 213 MG/DL
CO2 SERPL-SCNC: 22 MMOL/L
COLOR: YELLOW
CREAT SERPL-MCNC: 0.76 MG/DL
EOSINOPHIL # BLD AUTO: 0.28 K/UL
EOSINOPHIL NFR BLD AUTO: 3.9 %
ESTIMATED AVERAGE GLUCOSE: 114 MG/DL
GLUCOSE QUALITATIVE U: NEGATIVE
GLUCOSE SERPL-MCNC: 97 MG/DL
HBA1C MFR BLD HPLC: 5.6 %
HCT VFR BLD CALC: 41.7 %
HDLC SERPL-MCNC: 74 MG/DL
HGB BLD-MCNC: 13.1 G/DL
HYALINE CASTS: 1 /LPF
IMM GRANULOCYTES NFR BLD AUTO: 0.4 %
KETONES URINE: ABNORMAL
LDLC SERPL CALC-MCNC: 120 MG/DL
LEUKOCYTE ESTERASE URINE: NEGATIVE
LYMPHOCYTES # BLD AUTO: 1.95 K/UL
LYMPHOCYTES NFR BLD AUTO: 27.3 %
M TB IFN-G BLD-IMP: NEGATIVE
MAN DIFF?: NORMAL
MCHC RBC-ENTMCNC: 28.2 PG
MCHC RBC-ENTMCNC: 31.4 GM/DL
MCV RBC AUTO: 89.7 FL
MICROSCOPIC-UA: NORMAL
MONOCYTES # BLD AUTO: 0.69 K/UL
MONOCYTES NFR BLD AUTO: 9.7 %
NEUTROPHILS # BLD AUTO: 4.16 K/UL
NEUTROPHILS NFR BLD AUTO: 58.1 %
NITRITE URINE: NEGATIVE
NONHDLC SERPL-MCNC: 139 MG/DL
PH URINE: 5.5
PLATELET # BLD AUTO: 308 K/UL
POTASSIUM SERPL-SCNC: 4.5 MMOL/L
PROT SERPL-MCNC: 7 G/DL
PROTEIN URINE: NORMAL
QUANTIFERON TB PLUS MITOGEN MINUS NIL: 5.85 IU/ML
QUANTIFERON TB PLUS NIL: 0.02 IU/ML
QUANTIFERON TB PLUS TB1 MINUS NIL: 0 IU/ML
QUANTIFERON TB PLUS TB2 MINUS NIL: 0 IU/ML
RBC # BLD: 4.65 M/UL
RBC # FLD: 12.8 %
RED BLOOD CELLS URINE: 4 /HPF
SODIUM SERPL-SCNC: 140 MMOL/L
SPECIFIC GRAVITY URINE: 1.03
SQUAMOUS EPITHELIAL CELLS: 2 /HPF
TRIGL SERPL-MCNC: 94 MG/DL
TSH SERPL-ACNC: 1.81 UIU/ML
UROBILINOGEN URINE: NORMAL
VIT B12 SERPL-MCNC: 632 PG/ML
WBC # FLD AUTO: 7.15 K/UL
WHITE BLOOD CELLS URINE: 4 /HPF

## 2022-04-06 ENCOUNTER — APPOINTMENT (OUTPATIENT)
Dept: ORTHOPEDIC SURGERY | Facility: CLINIC | Age: 57
End: 2022-04-06
Payer: COMMERCIAL

## 2022-04-06 VITALS — WEIGHT: 182 LBS | BODY MASS INDEX: 30.32 KG/M2 | HEIGHT: 65 IN

## 2022-04-06 PROCEDURE — 99212 OFFICE O/P EST SF 10 MIN: CPT | Mod: 25

## 2022-04-06 NOTE — PROCEDURE
[Large Joint Injection] : Large joint injection [Right] : of the right [Knee] : knee [Euflexxa] : Euflexxa [#3] : series #3 [] : Patient tolerated procedure well [Call if redness, pain or fever occur] : call if redness, pain or fever occur [Apply ice for 15min out of every hour for the next 12-24 hours as tolerated] : apply ice for 15 minutes out of every hour for the next 12-24 hours as tolerated [Patient was advised to rest the joint(s) for ____ days] : patient was advised to rest the joint(s) for [unfilled] days [Patient had decreased mobility in the joint] : patient had decreased mobility in the joint [Risks, benefits, alternatives discussed / Verbal consent obtained] : the risks benefits, and alternatives have been discussed, and verbal consent was obtained

## 2022-04-07 ENCOUNTER — APPOINTMENT (OUTPATIENT)
Dept: ORTHOPEDIC SURGERY | Facility: CLINIC | Age: 57
End: 2022-04-07
Payer: OTHER MISCELLANEOUS

## 2022-04-07 PROCEDURE — 99212 OFFICE O/P EST SF 10 MIN: CPT | Mod: 25

## 2022-04-07 PROCEDURE — 20610 DRAIN/INJ JOINT/BURSA W/O US: CPT

## 2022-04-07 PROCEDURE — 99072 ADDL SUPL MATRL&STAF TM PHE: CPT

## 2022-04-07 NOTE — DISCUSSION/SUMMARY
[de-identified] : The risks, benefits, contents and alternatives to injection were explained in full to the patient.  Risks outlined include but are not limited to infection, sepsis, bleeding, scarring, skin discoloration, temporary increase in pain, syncopal episode, failure to resolve symptoms, allergic reaction, flare reaction, permanent white skin discoloration, symptom recurrence, and elevation of blood sugar in diabetics.  Patient understood the risks.  All questions were answered.  After discussion of options, patient requested an injection.  Oral informed consent was obtained and sterile prep was done of the injection site.  Sterile technique was used to introduce the mixture.  Patient tolerated the procedure well.  Patient advised to ice the injection site this evening.  Signs and symptoms of infection reviewed and patient advised to call immediately for redness, fevers, and/or chills.\par \par The patient was instructed on the importance of ice and elevation of the extremity to decrease swelling and pain. \par \par Progress note completed by Elizabeth Springer PA-C.

## 2022-04-07 NOTE — PROCEDURE
[Large Joint Injection] : Large joint injection [Left] : of the left [Knee] : knee [Pain] : pain [Inflammation] : inflammation [X-ray evidence of Osteoarthritis on this or prior visit] : x-ray evidence of Osteoarthritis on this or prior visit [Alcohol] : alcohol [Betadine] : betadine [Ethyl Chloride sprayed topically] : ethyl chloride sprayed topically [Sterile technique used] : sterile technique used [Euflexxa] : Euflexxa [#3] : series #3 [] : Patient tolerated procedure well [Call if redness, pain or fever occur] : call if redness, pain or fever occur [Apply ice for 15min out of every hour for the next 12-24 hours as tolerated] : apply ice for 15 minutes out of every hour for the next 12-24 hours as tolerated [Patient was advised to rest the joint(s) for ____ days] : patient was advised to rest the joint(s) for [unfilled] days [Previous OTC use and PT nontherapeutic] : patient has tried OTC's including aspirin, Ibuprofen, Aleve, etc or prescription NSAIDS, and/or exercises at home and/or physical therapy without satisfactory response [Risks, benefits, alternatives discussed / Verbal consent obtained] : the risks benefits, and alternatives have been discussed, and verbal consent was obtained [de-identified] : Lot: [id number] Exp: 2/15/23

## 2022-04-07 NOTE — ASSESSMENT
[FreeTextEntry1] : 3/23/22: She tolerated the injection well today. She is advised ice and rest. Activities as tolerated tomorrow. f/u in 1 week.\par \par 3/30/22: R knee Euflexxa #2. Tolerated well. Post inj instructions. FU 1 week.\par \par 4/6/22: 3rd euflexxa r knee. \par \par The documentation recorded by the scribe accurately reflects the service I personally performed and the decisions made by me.\par \par I, Daron Peña, attest that this documentation has been prepared under the direction and in the presence of Provider Lauro Ba MD.\par \par

## 2022-04-07 NOTE — PHYSICAL EXAM
[Right] : right knee [NL (0)] : extension 0 degrees [] : no effusion [TWNoteComboBox7] : flexion 120 degrees

## 2022-04-07 NOTE — HISTORY OF PRESENT ILLNESS
[Left Leg] : left leg [3] : 3 [Euflexxa] : Euflexxa [de-identified] : 03/09/2022: 55YO female patient present for left knee pain. f/u 30 from WC  3/31/22: L knee euflexxa #2.\par \par 4/7/22: Here for Euflexxa #3 L knee  [] : no [FreeTextEntry1] : knee [de-identified] : 3.31.22 [TWNoteComboBox1] : 50%

## 2022-04-07 NOTE — HISTORY OF PRESENT ILLNESS
[3] : 3 [Euflexxa] : Euflexxa [de-identified] : Last Note\par \par 3/30/22: 2nd euflexxa R knee \par \par 3/23/22: Euflexxa #1 right knee\par \par 03/16/2022: 55YO female patient present for right knee pain. MDP beneficial for the right knee.  [] : no [de-identified] : 3.30.22 rt knee [TWNoteComboBox1] : 20%

## 2022-06-01 DIAGNOSIS — Z11.1 ENCOUNTER FOR SCREENING FOR RESPIRATORY TUBERCULOSIS: ICD-10-CM

## 2022-07-07 ENCOUNTER — APPOINTMENT (OUTPATIENT)
Dept: INTERNAL MEDICINE | Facility: CLINIC | Age: 57
End: 2022-07-07

## 2022-07-07 ENCOUNTER — NON-APPOINTMENT (OUTPATIENT)
Age: 57
End: 2022-07-07

## 2022-07-07 VITALS
WEIGHT: 192 LBS | DIASTOLIC BLOOD PRESSURE: 70 MMHG | SYSTOLIC BLOOD PRESSURE: 110 MMHG | HEIGHT: 65 IN | OXYGEN SATURATION: 98 % | TEMPERATURE: 98.3 F | HEART RATE: 87 BPM | BODY MASS INDEX: 31.99 KG/M2

## 2022-07-07 DIAGNOSIS — R82.90 UNSPECIFIED ABNORMAL FINDINGS IN URINE: ICD-10-CM

## 2022-07-07 DIAGNOSIS — E66.3 OVERWEIGHT: ICD-10-CM

## 2022-07-07 LAB
25(OH)D3 SERPL-MCNC: 58.6 NG/ML
ALBUMIN SERPL ELPH-MCNC: 4.5 G/DL
ALP BLD-CCNC: 81 U/L
ALT SERPL-CCNC: 16 U/L
ANION GAP SERPL CALC-SCNC: 13 MMOL/L
APPEARANCE: CLEAR
AST SERPL-CCNC: 17 U/L
BACTERIA: NEGATIVE
BASOPHILS # BLD AUTO: 0.04 K/UL
BASOPHILS NFR BLD AUTO: 0.5 %
BILIRUB SERPL-MCNC: 0.4 MG/DL
BILIRUBIN URINE: NEGATIVE
BLOOD URINE: ABNORMAL
BUN SERPL-MCNC: 15 MG/DL
CALCIUM SERPL-MCNC: 9.8 MG/DL
CHLORIDE SERPL-SCNC: 105 MMOL/L
CHOLEST SERPL-MCNC: 228 MG/DL
CO2 SERPL-SCNC: 23 MMOL/L
COLOR: YELLOW
CREAT SERPL-MCNC: 0.71 MG/DL
EGFR: 100 ML/MIN/1.73M2
EOSINOPHIL # BLD AUTO: 0.3 K/UL
EOSINOPHIL NFR BLD AUTO: 4 %
ESTIMATED AVERAGE GLUCOSE: 120 MG/DL
GLUCOSE QUALITATIVE U: NEGATIVE
GLUCOSE SERPL-MCNC: 98 MG/DL
HBA1C MFR BLD HPLC: 5.8 %
HCT VFR BLD CALC: 39.9 %
HDLC SERPL-MCNC: 65 MG/DL
HGB BLD-MCNC: 13.1 G/DL
HYALINE CASTS: 5 /LPF
IMM GRANULOCYTES NFR BLD AUTO: 0.1 %
KETONES URINE: NEGATIVE
LDLC SERPL CALC-MCNC: 136 MG/DL
LEUKOCYTE ESTERASE URINE: ABNORMAL
LYMPHOCYTES # BLD AUTO: 2.22 K/UL
LYMPHOCYTES NFR BLD AUTO: 29.2 %
MAN DIFF?: NORMAL
MCHC RBC-ENTMCNC: 29.2 PG
MCHC RBC-ENTMCNC: 32.8 GM/DL
MCV RBC AUTO: 88.9 FL
MICROSCOPIC-UA: NORMAL
MONOCYTES # BLD AUTO: 0.68 K/UL
MONOCYTES NFR BLD AUTO: 9 %
NEUTROPHILS # BLD AUTO: 4.34 K/UL
NEUTROPHILS NFR BLD AUTO: 57.2 %
NITRITE URINE: NEGATIVE
NONHDLC SERPL-MCNC: 163 MG/DL
PH URINE: 5.5
PLATELET # BLD AUTO: 311 K/UL
POTASSIUM SERPL-SCNC: 4.1 MMOL/L
PROT SERPL-MCNC: 6.9 G/DL
PROTEIN URINE: NORMAL
RBC # BLD: 4.49 M/UL
RBC # FLD: 12.5 %
RED BLOOD CELLS URINE: 3 /HPF
SODIUM SERPL-SCNC: 141 MMOL/L
SPECIFIC GRAVITY URINE: 1.02
SQUAMOUS EPITHELIAL CELLS: 11 /HPF
TRIGL SERPL-MCNC: 136 MG/DL
TSH SERPL-ACNC: 2.06 UIU/ML
UROBILINOGEN URINE: NORMAL
VIT B12 SERPL-MCNC: 579 PG/ML
WBC # FLD AUTO: 7.59 K/UL
WHITE BLOOD CELLS URINE: 15 /HPF

## 2022-07-07 PROCEDURE — 99396 PREV VISIT EST AGE 40-64: CPT | Mod: 25

## 2022-07-07 PROCEDURE — 93000 ELECTROCARDIOGRAM COMPLETE: CPT

## 2022-07-07 NOTE — HISTORY OF PRESENT ILLNESS
[FreeTextEntry1] : 55 y/o female presents to the office today for a physical exam with outside labs.  [de-identified] : Patient presents the office today for complete physical exam and review of fasting blood work\par Patient has been feeling well has been exercising often diet has been fair

## 2022-07-07 NOTE — HEALTH RISK ASSESSMENT
[Good] : ~his/her~  mood as  good [No falls in past year] : Patient reported no falls in the past year [0] : 2) Feeling down, depressed, or hopeless: Not at all (0) [PHQ-2 Negative - No further assessment needed] : PHQ-2 Negative - No further assessment needed [[id number]] : 0 [Change in mental status noted] : No change in mental status noted [Language] : denies difficulty with language [Behavior] : denies difficulty with behavior [None] : None [With Significant Other] : lives with significant other [] :  [Fully functional (bathing, dressing, toileting, transferring, walking, feeding)] : Fully functional (bathing, dressing, toileting, transferring, walking, feeding) [Fully functional (using the telephone, shopping, preparing meals, housekeeping, doing laundry, using] : Fully functional and needs no help or supervision to perform IADLs (using the telephone, shopping, preparing meals, housekeeping, doing laundry, using transportation, managing medications and managing finances) [Reports changes in hearing] : Reports no changes in hearing [Reports changes in vision] : Reports no changes in vision [Reports normal functional visual acuity (ie: able to read med bottle)] : Reports normal functional visual acuity

## 2022-07-07 NOTE — PLAN
[FreeTextEntry1] : Reviewed fasting blood work with patient further improvement with diet and exercise needed cholesterol and A1c slightly elevated\par EKG normal sinus rhythm\par Urine showed white blood cells and small blood further evaluation with repeat urine analysis and culture patient asymptomatic\par Paperwork completed for patient for work\par Follow-up in 6 months to repeat fasting blood work to check A1c and cholesterol

## 2022-07-25 LAB
APPEARANCE: CLEAR
BACTERIA UR CULT: ABNORMAL
BACTERIA: NEGATIVE
BILIRUBIN URINE: NEGATIVE
BLOOD URINE: NEGATIVE
COLOR: COLORLESS
GLUCOSE QUALITATIVE U: NEGATIVE
HYALINE CASTS: 0 /LPF
KETONES URINE: NEGATIVE
LEUKOCYTE ESTERASE URINE: ABNORMAL
MICROSCOPIC-UA: NORMAL
NITRITE URINE: NEGATIVE
PH URINE: 6
PROTEIN URINE: NEGATIVE
RED BLOOD CELLS URINE: 2 /HPF
SPECIFIC GRAVITY URINE: 1
SQUAMOUS EPITHELIAL CELLS: 2 /HPF
UROBILINOGEN URINE: NORMAL
WHITE BLOOD CELLS URINE: 5 /HPF

## 2022-09-29 ENCOUNTER — APPOINTMENT (OUTPATIENT)
Dept: ORTHOPEDIC SURGERY | Facility: CLINIC | Age: 57
End: 2022-09-29

## 2022-09-29 VITALS — HEIGHT: 65 IN | BODY MASS INDEX: 31.99 KG/M2 | WEIGHT: 192 LBS

## 2022-09-29 DIAGNOSIS — M67.88 OTHER SPECIFIED DISORDERS OF SYNOVIUM AND TENDON, OTHER SITE: ICD-10-CM

## 2022-09-29 DIAGNOSIS — M77.32 CALCANEAL SPUR, LEFT FOOT: ICD-10-CM

## 2022-09-29 PROCEDURE — 73650 X-RAY EXAM OF HEEL: CPT | Mod: LT

## 2022-09-29 PROCEDURE — 99214 OFFICE O/P EST MOD 30 MIN: CPT

## 2022-09-29 NOTE — PHYSICAL EXAM
[Left] : left foot and ankle [Mild] : mild swelling over achilles tendon [NL (40)] : plantar flexion 40 degrees [NL 30)] : inversion 30 degrees [NL (20)] : eversion 20 degrees [5___] : eversion 5[unfilled]/5 [2+] : dorsalis pedis pulse: 2+ [] : patient ambulates without assistive device

## 2022-09-29 NOTE — HISTORY OF PRESENT ILLNESS
[10] : 10 [3] : 3 [Sharp] : sharp [Throbbing] : throbbing [de-identified] : 09/29/2022:  few months posterior heel pain. no recent injury. prior injections in the area 5 years ago. no recent treatment. no dm/tob. 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

## 2022-09-29 NOTE — ASSESSMENT
[FreeTextEntry1] : wbat\par PT\par mobic\par nsaids prn\par discussed surgical treatmetn if fails to resolve\par f/up 8 wks\par \par The patient's current medication management of their orthopedic diagnosis was reviewed today:\par \par (1) We discussed a comprehensive treatment plan that included possible pharmaceutical management involving the use of prescription strength medications including but not limited to options such as oral Naprosyn 500mg BID, once daily Meloxicam 15 mg, or 500-650 mg Tylenol versus over the counter oral medications and topical prescription NSAID Pennsaid vs over the counter Voltaren gel.\par (2) There is a moderate risk of morbidity with further treatment, especially from use of prescription strength medications and possible side effects of these medications which include upset stomach with oral medications, skin reactions to topical medications and cardiac/renal issues with long term use.\par (3) I recommended that the patient follow-up with their medical physician to discuss any significant specific potential issues with long term medication use such as interactions with current medications or with exacerbation of underlying medical comorbidities.\par (4) The benefits and risks associated with use of injectable, oral or topical, prescription and over the counter anti-inflammatory medications were discussed with the patient. The patient voiced understanding of the risks including but not limited to bleeding, stroke, kidney dysfunction, heart disease, and were referred to the black box warning label for further information.\par \par

## 2022-10-03 ENCOUNTER — LABORATORY RESULT (OUTPATIENT)
Age: 57
End: 2022-10-03

## 2022-10-03 ENCOUNTER — APPOINTMENT (OUTPATIENT)
Dept: INTERNAL MEDICINE | Facility: CLINIC | Age: 57
End: 2022-10-03

## 2022-10-03 VITALS
SYSTOLIC BLOOD PRESSURE: 120 MMHG | WEIGHT: 190 LBS | HEART RATE: 89 BPM | TEMPERATURE: 98.2 F | OXYGEN SATURATION: 99 % | HEIGHT: 65 IN | DIASTOLIC BLOOD PRESSURE: 80 MMHG | BODY MASS INDEX: 31.65 KG/M2

## 2022-10-03 PROCEDURE — 36415 COLL VENOUS BLD VENIPUNCTURE: CPT

## 2022-10-03 PROCEDURE — 99214 OFFICE O/P EST MOD 30 MIN: CPT | Mod: 25

## 2022-10-03 RX ORDER — CLOBETASOL PROPIONATE 0.5 MG/G
0.05 CREAM TOPICAL
Qty: 15 | Refills: 0 | Status: COMPLETED | COMMUNITY
Start: 2022-09-29

## 2022-10-03 NOTE — HISTORY OF PRESENT ILLNESS
[FreeTextEntry8] : 56 y/o female presents to the office today with diarrhea intermittent for 7 weeks.\par  She has been suffering with intermittent episodes of diarrhea for the last 2 years that last several weeks.  She has had about 5-6 of these episodes\par She has seen GI however no improvement or diagnosis given\par Pts has been taking multiple supplements and diet has been fair to poor

## 2022-10-03 NOTE — REVIEW OF SYSTEMS
[Abdominal Pain] : no abdominal pain [Nausea] : nausea [Diarrhea] : diarrhea [Negative] : Constitutional

## 2022-10-03 NOTE — PLAN
[FreeTextEntry1] : Pt is advised to stop all supplements \par L[place]kin diet\par Pt will go for Xray to help R/O any blockages \par Possible trial of Lomotil once Xray is done\par Allergy testing started in office to see if possible cause of diarrhea since current episode started after having 2 IPAs and a cheeseburger\par Allergist consult

## 2022-10-03 NOTE — PHYSICAL EXAM
[Non Tender] : non-tender [No Masses] : no abdominal mass palpated [de-identified] : Increased BS

## 2022-10-05 ENCOUNTER — NON-APPOINTMENT (OUTPATIENT)
Age: 57
End: 2022-10-05

## 2022-10-06 ENCOUNTER — TRANSCRIPTION ENCOUNTER (OUTPATIENT)
Age: 57
End: 2022-10-06

## 2022-10-06 LAB
BARLEY IGE QN: <0.1 KUA/L
CHERRY IGE QN: <0.1 KUA/L
COW MILK IGE QN: <0.1 KUA/L
CRAB IGE QN: <0.1 KUA/L
DEPRECATED BARLEY IGE RAST QL: 0
DEPRECATED CHERRY IGE RAST QL: 0
DEPRECATED COW MILK IGE RAST QL: 0
DEPRECATED CRAB IGE RAST QL: 0
DEPRECATED EGG WHITE IGE RAST QL: 0
DEPRECATED OAT IGE RAST QL: 0
DEPRECATED PEANUT IGE RAST QL: 0
DEPRECATED RYE IGE RAST QL: 0
DEPRECATED SOYBEAN IGE RAST QL: 0
DEPRECATED WHEAT IGE RAST QL: 0
EGG WHITE IGE QN: <0.1 KUA/L
OAT IGE QN: <0.1 KUA/L
PEANUT IGE QN: <0.1 KUA/L
RYE IGE QN: <0.1 KUA/L
SOYBEAN IGE QN: <0.1 KUA/L
TOTAL IGE SMQN RAST: 58 KU/L
WHEAT IGE QN: <0.1 KUA/L

## 2022-10-12 ENCOUNTER — APPOINTMENT (OUTPATIENT)
Dept: ALLERGY | Facility: CLINIC | Age: 57
End: 2022-10-12

## 2022-10-12 VITALS
HEART RATE: 94 BPM | OXYGEN SATURATION: 98 % | HEIGHT: 65 IN | SYSTOLIC BLOOD PRESSURE: 118 MMHG | WEIGHT: 186 LBS | DIASTOLIC BLOOD PRESSURE: 76 MMHG | TEMPERATURE: 98.3 F | BODY MASS INDEX: 30.99 KG/M2

## 2022-10-12 PROCEDURE — 99203 OFFICE O/P NEW LOW 30 MIN: CPT | Mod: 25

## 2022-10-12 PROCEDURE — 95018 ALL TSTG PERQ&IQ DRUGS/BIOL: CPT

## 2022-10-12 PROCEDURE — 95004 PERQ TESTS W/ALRGNC XTRCS: CPT

## 2022-10-12 NOTE — PHYSICAL EXAM
[Alert] : alert [Well Nourished] : well nourished [Healthy Appearance] : healthy appearance [No Acute Distress] : no acute distress [Well Developed] : well developed [Normal Voice/Communication] : normal voice communication [No Neck Mass] : no neck mass was observed [No LAD] : no lymphadenopathy [Normal Rate and Effort] : normal respiratory rhythm and effort [No Crackles] : no crackles [No Retractions] : no retractions [Bilateral Audible Breath Sounds] : bilateral audible breath sounds [Wheezing] : no wheezing was heard [Normal Rate] : heart rate was normal  [Normal S1, S2] : normal S1 and S2 [Regular Rhythm] : with a regular rhythm [No Rash] : no rash [No clubbing] : no clubbing [No Cyanosis] : no cyanosis [Normal Mood] : mood was normal [Normal Affect] : affect was normal [Judgment and Insight Age Appropriate] : judgement and insight is age appropriate [Alert, Awake, Oriented as Age-Appropriate] : alert, awake, oriented as age appropriate

## 2022-10-12 NOTE — SOCIAL HISTORY
[House] : [unfilled] lives in a house  [] :  [FreeTextEntry1] : Lives with spouse and son \par PE special   [Smokers in Household] : there are no smokers in the home

## 2022-10-12 NOTE — ASSESSMENT
[FreeTextEntry1] : Diarrhea with no evidence of food allergies\par \par Will obtain stool culture\par Will obtain stool for c def toxin since her symptoms started after a course of azithromycin antibiotic therapy

## 2022-10-12 NOTE — HISTORY OF PRESENT ILLNESS
[de-identified] : Patient with ongoing diarrhea for six weeks and getting worse - loss 10 pounds - she had some food testing and the results were negative.    She has not seen GI MD as of yet   In the past with fruit or salad she would have episodic loose bowel movements.  \par \par She had COVID 4/21 - uncontrollable diarrhea.    She has now been COVID negative.    She has not had a stool culture performed.   No foreign travel. \par \par Patient had sinus infection prior to the onset of her symptoms - thinks it was azithromycin.

## 2022-10-12 NOTE — REVIEW OF SYSTEMS
[Wgt Loss (___ Lbs)] : recent [unfilled] lb weight loss [Nl] : Genitourinary [FreeTextEntry7] : watery diarrhea - 15 x per day

## 2022-10-13 ENCOUNTER — LABORATORY RESULT (OUTPATIENT)
Age: 57
End: 2022-10-13

## 2022-10-13 ENCOUNTER — NON-APPOINTMENT (OUTPATIENT)
Age: 57
End: 2022-10-13

## 2022-10-13 ENCOUNTER — APPOINTMENT (OUTPATIENT)
Dept: GASTROENTEROLOGY | Facility: CLINIC | Age: 57
End: 2022-10-13

## 2022-10-13 VITALS
BODY MASS INDEX: 30.82 KG/M2 | SYSTOLIC BLOOD PRESSURE: 110 MMHG | WEIGHT: 185 LBS | TEMPERATURE: 98.5 F | DIASTOLIC BLOOD PRESSURE: 70 MMHG | RESPIRATION RATE: 16 BRPM | HEIGHT: 65 IN | HEART RATE: 77 BPM | OXYGEN SATURATION: 99 %

## 2022-10-13 DIAGNOSIS — R14.1 GAS PAIN: ICD-10-CM

## 2022-10-13 PROCEDURE — 99215 OFFICE O/P EST HI 40 MIN: CPT

## 2022-10-13 RX ORDER — MELOXICAM 15 MG/1
15 TABLET ORAL
Qty: 30 | Refills: 1 | Status: DISCONTINUED | COMMUNITY
Start: 2022-09-29 | End: 2022-10-13

## 2022-10-13 NOTE — ASSESSMENT
[FreeTextEntry1] : Organic vs. functional diarrhea that is getting better w prior sx in the setting of negative col.\par

## 2022-10-13 NOTE — PHYSICAL EXAM
[Alert] : alert [Normal Voice/Communication] : normal voice/communication [Healthy Appearing] : healthy appearing [No Acute Distress] : no acute distress [Hearing Threshold Finger Rub Not [place]] : hearing was normal [Normal Lips/Gums] : the lips and gums were normal [Oropharynx] : the oropharynx was normal [Normal Appearance] : the appearance of the neck was normal [No Neck Mass] : no neck mass was observed [No Respiratory Distress] : no respiratory distress [No Acc Muscle Use] : no accessory muscle use [Respiration, Rhythm And Depth] : normal respiratory rhythm and effort [Auscultation Breath Sounds / Voice Sounds] : lungs were clear to auscultation bilaterally [Normal S1, S2] : normal S1 and S2 [Murmurs] : no murmurs [No Masses] : no abdominal mass palpated [Oriented To Time, Place, And Person] : oriented to person, place, and time [General Appearance - Well Nourished] : well nourished [General Appearance - Well Developed] : well developed [Sclera] : the sclera and conjunctiva were normal [] : no respiratory distress [Heart Rate And Rhythm] : heart rate was normal and rhythm regular [Heart Sounds] : normal S1 and S2 [Bowel Sounds] : normal bowel sounds [Abdomen Soft] : soft [Abdomen Tenderness] : non-tender

## 2022-10-13 NOTE — HISTORY OF PRESENT ILLNESS
[de-identified] : never [FreeTextEntry1] : 7/2020 Col with negative bx\par 2015 [de-identified] :  \par July 6, 2020:\yosef Mitchell is a pleasant 54 year old female with diarrhea. Acute onset of symptoms about 2 weeks ago. no fevers. no sick contacts. She has been exclusively home with her  and sons. has not eaten out, traveled or even leave her home. no one else has been sick. She did start "Green" shakes over 4 weeks ago which contain tumeric. no other meds or supplements. She does take occasional NSAIDs (Ibuprofen about once a month for pain). no prior diarrhea or GI illness. normal colonoscopy about 5 years ago. reports 2 days of nausea and bloating, followed by explosive diarrhea. this has been followed by watery diarrhea 7 to 9 times a day. starts shortly after she wakes up. worse after eating. no change in diet. No bleeding. no weight loss. has been hydrating and following a BRAT diet. Diffuse abdominal pain and intense bloating has not recurred but she does have lower abdominal cramping pain preceding BMs. \par Symptoms: denies heartburn, improved nausea, resolved vomiting, diarrhea worsened, denies constipation, denies jaundice, improved abdominal pain, abdominal swelling resolved and denies rectal pain. \par \yosef Olivarez reports that for the past 2 weeks, she has had less fecal urgency and stool is more formed. she has not resumed her normal bowel routine (one formed BM every morning). she has stopped using NSAIDs and now will only take Tylenol for joint pains or headaches. chronic foot pain, gets cortisone injections, last one 10/19. colonoscopy with random biopsies was normal (negative for microscopic colitis). She reports that her weight is stable and she has maintained good hydration.

## 2022-10-14 ENCOUNTER — NON-APPOINTMENT (OUTPATIENT)
Age: 57
End: 2022-10-14

## 2022-10-15 LAB
G LAMBLIA AG STL QL: NORMAL
WRIGHT STN STL: NEGATIVE

## 2022-10-16 LAB — BACTERIA STL CULT: NORMAL

## 2022-10-18 LAB — CGA SERPL-MCNC: 17.1 NG/ML

## 2022-10-27 ENCOUNTER — APPOINTMENT (OUTPATIENT)
Dept: GASTROENTEROLOGY | Facility: CLINIC | Age: 57
End: 2022-10-27

## 2022-10-27 VITALS
TEMPERATURE: 98 F | RESPIRATION RATE: 16 BRPM | HEIGHT: 65 IN | SYSTOLIC BLOOD PRESSURE: 110 MMHG | WEIGHT: 183 LBS | DIASTOLIC BLOOD PRESSURE: 70 MMHG | OXYGEN SATURATION: 98 % | BODY MASS INDEX: 30.49 KG/M2 | HEART RATE: 86 BPM

## 2022-10-27 DIAGNOSIS — Z87.898 PERSONAL HISTORY OF OTHER SPECIFIED CONDITIONS: ICD-10-CM

## 2022-10-27 DIAGNOSIS — K58.2 MIXED IRRITABLE BOWEL SYNDROME: ICD-10-CM

## 2022-10-27 PROCEDURE — 99215 OFFICE O/P EST HI 40 MIN: CPT

## 2022-10-27 RX ORDER — LEVOFLOXACIN 500 MG/1
500 TABLET, FILM COATED ORAL DAILY
Qty: 7 | Refills: 0 | Status: COMPLETED | COMMUNITY
Start: 2022-10-17 | End: 2022-10-27

## 2022-10-27 RX ORDER — RIFAXIMIN 550 MG/1
550 TABLET ORAL
Qty: 60 | Refills: 6 | Status: COMPLETED | COMMUNITY
Start: 2022-10-13 | End: 2022-10-27

## 2022-10-27 RX ORDER — DIPHENOXYLATE HYDROCHLORIDE AND ATROPINE SULFATE 2.5; .025 MG/1; MG/1
2.5-0.025 TABLET ORAL TWICE DAILY
Qty: 30 | Refills: 0 | Status: COMPLETED | COMMUNITY
Start: 2022-10-04 | End: 2022-10-27

## 2022-10-27 RX ORDER — RIFAXIMIN 200 MG/1
200 TABLET ORAL
Qty: 90 | Refills: 3 | Status: COMPLETED | COMMUNITY
Start: 2022-10-13 | End: 2022-10-27

## 2022-10-27 NOTE — ASSESSMENT
[FreeTextEntry1] : My impression is that of a female doing much better after treatment for presumptive bacterial overgrowth, and largely doing well but with apparently underlying functional bowel disorder with mixed pattern.\par \par I have spent a great deal of time discussing the role of daily high-intensity exercise with the patient. We discussed behavior modification strategies to institute this habit.   I have discussed nutrition in great detail including consuming vegetable fibers on a daily basis and limiting simple carbohydrates 5 days per week.  We have also reviewed the benefits of soluble fiber supplementation, including (but not limited to), favorable effects on lipid profile, weight control and the salutary effects on colonic microbiota. We reviewed the effects of such daily habits on metabolism and the metabolic set point resulting in a healthy weight, decreased pain sensitivity (effecting the GI tract), bowel habits and other aspects of health.  I recommended a mindful meditation practice and reviewed behavior modification strategies.\par \par Follow-up in 2 months to discuss possible cognitive behavior therapy.

## 2022-10-27 NOTE — HISTORY OF PRESENT ILLNESS
[de-identified] : never [FreeTextEntry1] : 7/2020 Col with negative bx\par 2015 [de-identified] :  \par July 6, 2020:\yosef Mitchell is a pleasant 54 year old female with diarrhea. Acute onset of symptoms about 2 weeks ago. no fevers. no sick contacts. She has been exclusively home with her  and sons. has not eaten out, traveled or even leave her home. no one else has been sick. She did start "Green" shakes over 4 weeks ago which contain tumeric. no other meds or supplements. She does take occasional NSAIDs (Ibuprofen about once a month for pain). no prior diarrhea or GI illness. normal colonoscopy about 5 years ago. reports 2 days of nausea and bloating, followed by explosive diarrhea. this has been followed by watery diarrhea 7 to 9 times a day. starts shortly after she wakes up. worse after eating. no change in diet. No bleeding. no weight loss. has been hydrating and following a BRAT diet. Diffuse abdominal pain and intense bloating has not recurred but she does have lower abdominal cramping pain preceding BMs. \par Symptoms: denies heartburn, improved nausea, resolved vomiting, diarrhea worsened, denies constipation, denies jaundice, improved abdominal pain, abdominal swelling resolved and denies rectal pain. \par \yosef Olivarez reports that for the past 2 weeks, she has had less fecal urgency and stool is more formed. she has not resumed her normal bowel routine (one formed BM every morning). she has stopped using NSAIDs and now will only take Tylenol for joint pains or headaches. chronic foot pain, gets cortisone injections, last one 10/19. colonoscopy with random biopsies was normal (negative for microscopic colitis). She reports that her weight is stable and she has maintained good hydration.

## 2022-10-27 NOTE — PHYSICAL EXAM
[Alert] : alert [Normal Voice/Communication] : normal voice/communication [Healthy Appearing] : healthy appearing [No Acute Distress] : no acute distress [Hearing Threshold Finger Rub Not [place]] : hearing was normal [Normal Lips/Gums] : the lips and gums were normal [Oropharynx] : the oropharynx was normal [Normal Appearance] : the appearance of the neck was normal [No Neck Mass] : no neck mass was observed [No Respiratory Distress] : no respiratory distress [No Acc Muscle Use] : no accessory muscle use [Respiration, Rhythm And Depth] : normal respiratory rhythm and effort [Auscultation Breath Sounds / Voice Sounds] : lungs were clear to auscultation bilaterally [Normal S1, S2] : normal S1 and S2 [Murmurs] : no murmurs [No Masses] : no abdominal mass palpated [Oriented To Time, Place, And Person] : oriented to person, place, and time [General Appearance - Well Nourished] : well nourished [General Appearance - Well Developed] : well developed [Sclera] : the sclera and conjunctiva were normal [] : no respiratory distress [Heart Rate And Rhythm] : heart rate was normal and rhythm regular [Heart Sounds] : normal S1 and S2 [Bowel Sounds] : normal bowel sounds [Abdomen Soft] : soft [Abdomen Tenderness] : non-tender

## 2022-11-09 ENCOUNTER — RESULT REVIEW (OUTPATIENT)
Age: 57
End: 2022-11-09

## 2022-11-14 ENCOUNTER — APPOINTMENT (OUTPATIENT)
Dept: CT IMAGING | Facility: CLINIC | Age: 57
End: 2022-11-14
Payer: COMMERCIAL

## 2022-11-14 LAB — CDIFF BY PCR: NOT DETECTED

## 2022-11-14 PROCEDURE — 74177 CT ABD & PELVIS W/CONTRAST: CPT

## 2022-11-23 ENCOUNTER — APPOINTMENT (OUTPATIENT)
Dept: GASTROENTEROLOGY | Facility: CLINIC | Age: 57
End: 2022-11-23

## 2022-11-23 VITALS
TEMPERATURE: 98.2 F | DIASTOLIC BLOOD PRESSURE: 70 MMHG | OXYGEN SATURATION: 98 % | WEIGHT: 180 LBS | HEART RATE: 89 BPM | HEIGHT: 65 IN | SYSTOLIC BLOOD PRESSURE: 110 MMHG | BODY MASS INDEX: 29.99 KG/M2 | RESPIRATION RATE: 16 BRPM

## 2022-11-23 PROCEDURE — 99214 OFFICE O/P EST MOD 30 MIN: CPT

## 2022-11-23 NOTE — PHYSICAL EXAM
[Alert] : alert [Normal Voice/Communication] : normal voice/communication [Healthy Appearing] : healthy appearing [No Acute Distress] : no acute distress [Hearing Threshold Finger Rub Not [place]] : hearing was normal [Normal Lips/Gums] : the lips and gums were normal [Oropharynx] : the oropharynx was normal [Normal Appearance] : the appearance of the neck was normal [No Neck Mass] : no neck mass was observed [No Respiratory Distress] : no respiratory distress [No Acc Muscle Use] : no accessory muscle use [Respiration, Rhythm And Depth] : normal respiratory rhythm and effort [Auscultation Breath Sounds / Voice Sounds] : lungs were clear to auscultation bilaterally [Normal S1, S2] : normal S1 and S2 [Murmurs] : no murmurs [No Masses] : no abdominal mass palpated [Oriented To Time, Place, And Person] : oriented to person, place, and time [General Appearance - Well Nourished] : well nourished [General Appearance - Well Developed] : well developed [Sclera] : the sclera and conjunctiva were normal [] : no respiratory distress [Heart Rate And Rhythm] : heart rate was normal and rhythm regular [Heart Sounds] : normal S1 and S2 [Bowel Sounds] : normal bowel sounds [Abdomen Soft] : soft [Abdomen Tenderness] : non-tender

## 2022-11-23 NOTE — ASSESSMENT
[FreeTextEntry1] : My impression is that of a female doing slightly better but still having significant chronic diarrhea of unknown clear origin.\par \par I have, again, spent a great deal of time discussing the role of daily high-intensity exercise with the patient. We discussed behavior modification strategies to institute this habit.   I have discussed nutrition in great detail including consuming vegetable fibers on a daily basis and limiting simple carbohydrates 5 days per week.  We have also reviewed the benefits of soluble fiber supplementation, including (but not limited to), favorable effects on lipid profile, weight control and the salutary effects on colonic microbiota. We reviewed the effects of such daily habits on metabolism and the metabolic set point resulting in a healthy weight, decreased pain sensitivity (effecting the GI tract), bowel habits and other aspects of health.  I recommended a mindful meditation practice and reviewed behavior modification strategies.\par \par Continue probiotics and fiber supplementation.\par \par I have asked the patient to schedule a colonoscopy in the near future. I have reviewed the risks benefits and alternatives and provided the patient literature to read.  I have emphasized the need to have a good clean out including adequate fluid intake and avoiding seeds for one week prior to the procedure.\par \par Follow-up after that, if no colitis, to discuss possible cognitive behavior therapy.

## 2022-11-23 NOTE — REVIEW OF SYSTEMS
[Recent Weight Loss (___ Lbs)] : recent [unfilled] ~Ulb weight loss [As Noted in HPI] : as noted in HPI [Diarrhea] : diarrhea [Negative] : Heme/Lymph [Abdominal Pain] : no abdominal pain [Vomiting] : no vomiting [Constipation] : no constipation [Heartburn] : no heartburn [Melena (black stool)] : no melena [Bleeding] : no bleeding [Fecal Incontinence (soiling)] : no fecal incontinence [Bloating (gassiness)] : no bloating

## 2022-11-23 NOTE — HISTORY OF PRESENT ILLNESS
[de-identified] : never [FreeTextEntry1] : 7/2020 Col with negative bx\par 2015 [de-identified] :  \par July 6, 2020:\yosef Mitchell is a pleasant 54 year old female with diarrhea. Acute onset of symptoms about 2 weeks ago. no fevers. no sick contacts. She has been exclusively home with her  and sons. has not eaten out, traveled or even leave her home. no one else has been sick. She did start "Green" shakes over 4 weeks ago which contain tumeric. no other meds or supplements. She does take occasional NSAIDs (Ibuprofen about once a month for pain). no prior diarrhea or GI illness. normal colonoscopy about 5 years ago. reports 2 days of nausea and bloating, followed by explosive diarrhea. this has been followed by watery diarrhea 7 to 9 times a day. starts shortly after she wakes up. worse after eating. no change in diet. No bleeding. no weight loss. has been hydrating and following a BRAT diet. Diffuse abdominal pain and intense bloating has not recurred but she does have lower abdominal cramping pain preceding BMs. \par Symptoms: denies heartburn, improved nausea, resolved vomiting, diarrhea worsened, denies constipation, denies jaundice, improved abdominal pain, abdominal swelling resolved and denies rectal pain. \par \yosef Olivarez reports that for the past 2 weeks, she has had less fecal urgency and stool is more formed. she has not resumed her normal bowel routine (one formed BM every morning). she has stopped using NSAIDs and now will only take Tylenol for joint pains or headaches. chronic foot pain, gets cortisone injections, last one 10/19. colonoscopy with random biopsies was normal (negative for microscopic colitis). She reports that her weight is stable and she has maintained good hydration.

## 2022-12-01 ENCOUNTER — APPOINTMENT (OUTPATIENT)
Dept: ORTHOPEDIC SURGERY | Facility: CLINIC | Age: 57
End: 2022-12-01

## 2022-12-05 ENCOUNTER — RX RENEWAL (OUTPATIENT)
Age: 57
End: 2022-12-05

## 2023-01-05 ENCOUNTER — APPOINTMENT (OUTPATIENT)
Dept: GASTROENTEROLOGY | Facility: CLINIC | Age: 58
End: 2023-01-05
Payer: COMMERCIAL

## 2023-01-05 VITALS
DIASTOLIC BLOOD PRESSURE: 81 MMHG | TEMPERATURE: 98.5 F | OXYGEN SATURATION: 99 % | BODY MASS INDEX: 29.16 KG/M2 | HEIGHT: 65 IN | HEART RATE: 74 BPM | SYSTOLIC BLOOD PRESSURE: 127 MMHG | WEIGHT: 175 LBS | RESPIRATION RATE: 16 BRPM

## 2023-01-05 DIAGNOSIS — K52.9 NONINFECTIVE GASTROENTERITIS AND COLITIS, UNSPECIFIED: ICD-10-CM

## 2023-01-05 PROCEDURE — 99214 OFFICE O/P EST MOD 30 MIN: CPT

## 2023-01-05 RX ORDER — DICLOFENAC SODIUM 1% 10 MG/G
1 GEL TOPICAL DAILY
Qty: 1 | Refills: 1 | Status: COMPLETED | COMMUNITY
Start: 2022-04-07 | End: 2023-01-05

## 2023-01-05 RX ORDER — HALOBETASOL PROPIONATE 0.5 MG/G
0.05 CREAM TOPICAL TWICE DAILY
Qty: 1 | Refills: 0 | Status: COMPLETED | COMMUNITY
Start: 2021-07-01 | End: 2023-01-05

## 2023-01-05 RX ORDER — SODIUM SULFATE, POTASSIUM SULFATE AND MAGNESIUM SULFATE 1.6; 3.13; 17.5 G/177ML; G/177ML; G/177ML
17.5-3.13-1.6 SOLUTION ORAL
Qty: 1 | Refills: 0 | Status: COMPLETED | COMMUNITY
Start: 2022-11-23 | End: 2023-01-05

## 2023-01-05 NOTE — HISTORY OF PRESENT ILLNESS
[de-identified] : never [FreeTextEntry1] : 7/2020 Col with negative bx\par 2015 [de-identified] :  \par July 6, 2020:\yosef Mitchell is a pleasant 54 year old female with diarrhea. Acute onset of symptoms about 2 weeks ago. no fevers. no sick contacts. She has been exclusively home with her  and sons. has not eaten out, traveled or even leave her home. no one else has been sick. She did start "Green" shakes over 4 weeks ago which contain tumeric. no other meds or supplements. She does take occasional NSAIDs (Ibuprofen about once a month for pain). no prior diarrhea or GI illness. normal colonoscopy about 5 years ago. reports 2 days of nausea and bloating, followed by explosive diarrhea. this has been followed by watery diarrhea 7 to 9 times a day. starts shortly after she wakes up. worse after eating. no change in diet. No bleeding. no weight loss. has been hydrating and following a BRAT diet. Diffuse abdominal pain and intense bloating has not recurred but she does have lower abdominal cramping pain preceding BMs. \par Symptoms: denies heartburn, improved nausea, resolved vomiting, diarrhea worsened, denies constipation, denies jaundice, improved abdominal pain, abdominal swelling resolved and denies rectal pain. \par \yosef Olivarez reports that for the past 2 weeks, she has had less fecal urgency and stool is more formed. she has not resumed her normal bowel routine (one formed BM every morning). she has stopped using NSAIDs and now will only take Tylenol for joint pains or headaches. chronic foot pain, gets cortisone injections, last one 10/19. colonoscopy with random biopsies was normal (negative for microscopic colitis). She reports that her weight is stable and she has maintained good hydration.

## 2023-01-05 NOTE — ASSESSMENT
[FreeTextEntry1] : My impression is that of a female doing slightly better but still having significant chronic diarrhea of unknown origin, slowly improving.\par \par I have, again, spent a great deal of time discussing the role of daily high-intensity exercise with the patient. We discussed behavior modification strategies to institute this habit.   I have discussed nutrition in great detail including consuming vegetable fibers on a daily basis and limiting simple carbohydrates 5 days per week.  We have also reviewed the benefits of soluble fiber supplementation, including (but not limited to), favorable effects on lipid profile, weight control and the salutary effects on colonic microbiota. We reviewed the effects of such daily habits on metabolism and the metabolic set point resulting in a healthy weight, decreased pain sensitivity (effecting the GI tract), bowel habits and other aspects of health.  I recommended a mindful meditation practice and reviewed behavior modification strategies.\par \par Continue probiotics and fiber supplementation.\par \par I have asked the patient to schedule a colonoscopy in the near future. I have reviewed the risks benefits and alternatives and provided the patient literature to read.  I have emphasized the need to have a good clean out including adequate fluid intake and avoiding seeds for one week prior to the procedure.\par \par Follow-up after that, if no colitis, to discuss possible cognitive behavior therapy.

## 2023-01-05 NOTE — PHYSICAL EXAM
[Alert] : alert [Normal Voice/Communication] : normal voice/communication [Healthy Appearing] : healthy appearing [No Acute Distress] : no acute distress [Hearing Threshold Finger Rub Not [place]] : hearing was normal [Normal Lips/Gums] : the lips and gums were normal [Oropharynx] : the oropharynx was normal [Normal Appearance] : the appearance of the neck was normal [No Neck Mass] : no neck mass was observed [No Respiratory Distress] : no respiratory distress [No Acc Muscle Use] : no accessory muscle use [Respiration, Rhythm And Depth] : normal respiratory rhythm and effort [Auscultation Breath Sounds / Voice Sounds] : lungs were clear to auscultation bilaterally [Normal S1, S2] : normal S1 and S2 [Murmurs] : no murmurs [No Masses] : no abdominal mass palpated [Oriented To Time, Place, And Person] : oriented to person, place, and time [General Appearance - Well Nourished] : well nourished [General Appearance - Well Developed] : well developed [Sclera] : the sclera and conjunctiva were normal [] : no respiratory distress [Heart Rate And Rhythm] : heart rate was normal and rhythm regular [Heart Sounds] : normal S1 and S2 [Bowel Sounds] : normal bowel sounds [Abdomen Soft] : soft [Abdomen Tenderness] : non-tender

## 2023-01-05 NOTE — REVIEW OF SYSTEMS
[Recent Weight Loss (___ Lbs)] : recent [unfilled] ~Ulb weight loss [As Noted in HPI] : as noted in HPI [Diarrhea] : diarrhea [Negative] : Heme/Lymph [Abdominal Pain] : no abdominal pain [Vomiting] : no vomiting [Constipation] : no constipation [Heartburn] : no heartburn [Melena (black stool)] : no melena [Fecal Incontinence (soiling)] : no fecal incontinence [Swollen Glands] : no swollen glands

## 2023-01-12 DIAGNOSIS — Z01.812 ENCOUNTER FOR PREPROCEDURAL LABORATORY EXAMINATION: ICD-10-CM

## 2023-03-08 ENCOUNTER — APPOINTMENT (OUTPATIENT)
Dept: GASTROENTEROLOGY | Facility: HOSPITAL | Age: 58
End: 2023-03-08

## 2023-03-08 ENCOUNTER — OUTPATIENT (OUTPATIENT)
Dept: OUTPATIENT SERVICES | Facility: HOSPITAL | Age: 58
LOS: 1 days | End: 2023-03-08
Payer: COMMERCIAL

## 2023-03-08 DIAGNOSIS — K52.9 NONINFECTIVE GASTROENTERITIS AND COLITIS, UNSPECIFIED: ICD-10-CM

## 2023-03-08 PROCEDURE — 45378 DIAGNOSTIC COLONOSCOPY: CPT

## 2023-03-08 RX ORDER — SODIUM CHLORIDE 9 MG/ML
500 INJECTION INTRAMUSCULAR; INTRAVENOUS; SUBCUTANEOUS
Refills: 0 | Status: DISCONTINUED | OUTPATIENT
Start: 2023-03-08 | End: 2023-03-22

## 2023-03-09 LAB — SARS-COV-2 N GENE NPH QL NAA+PROBE: NOT DETECTED

## 2023-05-10 ENCOUNTER — APPOINTMENT (OUTPATIENT)
Dept: ORTHOPEDIC SURGERY | Facility: CLINIC | Age: 58
End: 2023-05-10
Payer: COMMERCIAL

## 2023-05-10 VITALS — HEIGHT: 65 IN | WEIGHT: 170 LBS | BODY MASS INDEX: 28.32 KG/M2

## 2023-05-10 PROCEDURE — 99213 OFFICE O/P EST LOW 20 MIN: CPT

## 2023-05-11 NOTE — ASSESSMENT
[FreeTextEntry1] : \par \par 05/11/2023  R knee gel- inj ordered. completed last visco series 04/2022\par Interested in repeating visco injection r knee. Previous injections  reduced pain, and increased function in walking and stairs.\par Obtain authorization for visco injections.\par \par The documentation recorded by the scribe accurately reflects the service I personally performed and the decisions made by me.\par I, Phyllis Peñaibnicole, attest that this documentation has been prepared under the direction and in the presence of Provider Lauro Ba MD.\par \par The patient was seen by Lauro Ba MD.\par

## 2023-05-11 NOTE — HISTORY OF PRESENT ILLNESS
[10] : 10 [5] : 5 [Dull/Aching] : dull/aching [Radiating] : radiating [Throbbing] : throbbing [Constant] : constant [Leisure] : leisure [Sleep] : sleep [Injection therapy] : injection therapy [Lying in bed] : lying in bed [de-identified] : 5/10/23\par \par 58 yo fm presents for a fu for her R knee OA. She saw signficant relief with last series last year [] : no [FreeTextEntry1] : right knee [FreeTextEntry5] : VIRI is a 57 year old F who is here today for right knee pain. NKI. Chronic issues with right knee, last week pain started to back up. Patient denies any numbness/tingling in leg. Would like to start new Visco series.  [FreeTextEntry7] : hip to foot

## 2023-05-11 NOTE — PHYSICAL EXAM
[Left] : left foot and ankle [Mild] : mild swelling over achilles tendon [NL (40)] : plantar flexion 40 degrees [NL 30)] : inversion 30 degrees [NL (20)] : eversion 20 degrees [2+] : dorsalis pedis pulse: 2+ [Right] : right knee [NL (0)] : extension 0 degrees [5___] : hamstring 5[unfilled]/5 [] : patient ambulates without assistive device [TWNoteComboBox7] : flexion 120 degrees

## 2023-05-17 ENCOUNTER — APPOINTMENT (OUTPATIENT)
Dept: ORTHOPEDIC SURGERY | Facility: CLINIC | Age: 58
End: 2023-05-17
Payer: OTHER MISCELLANEOUS

## 2023-05-17 VITALS — HEIGHT: 65 IN

## 2023-05-17 PROCEDURE — 99213 OFFICE O/P EST LOW 20 MIN: CPT

## 2023-05-17 PROCEDURE — 73564 X-RAY EXAM KNEE 4 OR MORE: CPT | Mod: LT

## 2023-05-18 NOTE — ASSESSMENT
[FreeTextEntry1] : Completed Euflexxa series on 04/7/2023. \par \par 05/18/2023\par Interested in repeating euflexxa injection left knee. Previous injections  reduced pain, and increased function in walking and stairs.\par Obtain authorization for visco injections.\par \par

## 2023-05-18 NOTE — PHYSICAL EXAM
[Left] : left knee [4___] : hamstring 4[unfilled]/5 [] : patient ambulates without assistive device [TWNoteComboBox7] : flexion 130 degrees [de-identified] : extension 0 degrees

## 2023-05-18 NOTE — HISTORY OF PRESENT ILLNESS
[Work related] : work related [Result of repetitive motion] : result of repetitive motion [6] : 6 [3] : 3 [Dull/Aching] : dull/aching [Standing] : standing [Walking] : walking [Exercising] : exercising [Stairs] : stairs [Full time] : Work status: full time [de-identified] : WC DOI 5/26/2021:\par \par 03/09/2022: 55YO female patient present for left knee pain. \par \par 3/31/22 L knee euflexxa #2.\par \par 4/7/22: Here for Euflexxa #3 L knee \par \par 05/18/2023 Here with return of left knee pain.  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

## 2023-05-24 ENCOUNTER — APPOINTMENT (OUTPATIENT)
Dept: ORTHOPEDIC SURGERY | Facility: CLINIC | Age: 58
End: 2023-05-24
Payer: COMMERCIAL

## 2023-05-24 PROCEDURE — 20610 DRAIN/INJ JOINT/BURSA W/O US: CPT | Mod: RT

## 2023-05-24 PROCEDURE — 99213 OFFICE O/P EST LOW 20 MIN: CPT | Mod: 25

## 2023-05-24 NOTE — HISTORY OF PRESENT ILLNESS
[de-identified] : 5/24/23: Follow up right knee. Symptoms continue. She has been approved for right knee Euflexxa injections.\par \par 5/10/23: 56 yo fm presents for a fu for her R knee OA. She saw signficant relief with last series last year

## 2023-05-24 NOTE — ASSESSMENT
[FreeTextEntry1] : \par \par 05/11/2023  R knee gel- inj ordered. completed last visco series 04/2022\par Interested in repeating visco injection r knee. Previous injections  reduced pain, and increased function in walking and stairs.\par Obtain authorization for visco injections.\par \par 5/17/23: Euflexxa #1 right knee tolerated well.\par RTO in 1 week to continue.\par \par Progress note completed by RAYMUNDO Bray under the direct supervision of Lauro Ba M.D.\par

## 2023-05-24 NOTE — PROCEDURE
[FreeTextEntry3] : Viscosupplementation Injection: X-ray evidence of osteoarthritis on this or prior visit and patient has tried OTC's including aspirin, Ibuprofen, Aleve etc or prescription NSAIDS, and/or exercises at home and/ or physical therapy without satisfactory response. \par \par An injection of Euflexxa 2.5ml #1 was injected into the right knee(s) after verbal consent obtained. \par \par Patient has tried OTC's including aspirin, Ibuprofen, Aleve etc or prescription NSAIDS, and/or exercises at home and/ or physical therapy without satisfactory response and Patient has decreased mobility in the joint. The risks, benefits, and alternatives to cortisone injection were explained in full to the patient. Risks outlined include but are not limited to infection, sepsis, bleeding, scarring, skin discoloration, temporary increase in pain, syncopal episode, failure to resolve symptoms, allergic reaction, and symptom recurrence. Patient understands the risks. All questions were answered. After discussion of options, patient requested an injection. Oral informed consent was obtained. Sterile technique was utilized for the procedure including the preparation of the solutions used for the injection. Injection site was prepped with betadine and alcohol. Ethyl chloride sprayed topically. Sterile technique used. Patient tolerated procedure well. \par \par Post Procedure Instructions: Patient was advised to call if redness, pain, or fever occur. Apply ice for 15 min. every 2 hours for the next 12-24 hours as tolerated. Patient was advised to rest the joint(s) for 1-2 days.\par

## 2023-05-24 NOTE — PHYSICAL EXAM
[Right] : right knee [NL (0)] : extension 0 degrees [5___] : hamstring 5[unfilled]/5 [] : patient ambulates without assistive device [TWNoteComboBox7] : flexion 120 degrees

## 2023-05-25 ENCOUNTER — APPOINTMENT (OUTPATIENT)
Dept: ORTHOPEDIC SURGERY | Facility: CLINIC | Age: 58
End: 2023-05-25
Payer: OTHER MISCELLANEOUS

## 2023-05-25 PROCEDURE — 20610 DRAIN/INJ JOINT/BURSA W/O US: CPT | Mod: LT

## 2023-05-25 PROCEDURE — 99213 OFFICE O/P EST LOW 20 MIN: CPT | Mod: 25

## 2023-05-25 NOTE — PROCEDURE
[FreeTextEntry3] : Viscosupplementation Injection: X-ray evidence of osteoarthritis on this or prior visit and patient has tried OTC's including aspirin, Ibuprofen, Aleve etc or prescription NSAIDS, and/or exercises at home and/ or physical therapy without satisfactory response. \par \par An injection of Euflexxa 2.5ml #1 was injected into the left knee(s) after verbal consent obtained. \par \par Patient has tried OTC's including aspirin, Ibuprofen, Aleve etc or prescription NSAIDS, and/or exercises at home and/ or physical therapy without satisfactory response and Patient has decreased mobility in the joint. The risks, benefits, and alternatives to cortisone injection were explained in full to the patient. Risks outlined include but are not limited to infection, sepsis, bleeding, scarring, skin discoloration, temporary increase in pain, syncopal episode, failure to resolve symptoms, allergic reaction, and symptom recurrence. Patient understands the risks. All questions were answered. After discussion of options, patient requested an injection. Oral informed consent was obtained. Sterile technique was utilized for the procedure including the preparation of the solutions used for the injection. Injection site was prepped with betadine and alcohol. Ethyl chloride sprayed topically. Sterile technique used. Patient tolerated procedure well. \par \par Post Procedure Instructions: Patient was advised to call if redness, pain, or fever occur. Apply ice for 15 min. every 2 hours for the next 12-24 hours as tolerated. Patient was advised to rest the joint(s) for 1-2 days.\par

## 2023-05-25 NOTE — ASSESSMENT
[FreeTextEntry1] : Completed Euflexxa series on 04/7/2023. \par \par 05/18/2023\par Interested in repeating euflexxa injection left knee. Previous injections  reduced pain, and increased function in walking and stairs.\par Obtain authorization for visco injections.\par \par 5/25/23: Euflexxa #1 left knee tolerated well.\par RTO in 1 week to continue.\par \par Progress note completed by RAYMUNDO Bray under the direct supervision of Lauro Ba M.D.\par \par

## 2023-05-25 NOTE — HISTORY OF PRESENT ILLNESS
[de-identified] : WC DOI 5/26/2021:\par \par 03/09/2022: 57YO female patient present for left knee pain. \par \par 3/31/22 L knee euflexxa #2.\par \par 4/7/22: Here for Euflexxa #3 L knee \par \par 05/18/2023 Here with return of left knee pain. \par \par 05/25/23: Follow up left knee. Symptoms continue. Euflexxa injections have been approved.

## 2023-05-25 NOTE — PHYSICAL EXAM
[Left] : left knee [4___] : hamstring 4[unfilled]/5 [] : patient ambulates without assistive device [TWNoteComboBox7] : flexion 130 degrees [de-identified] : extension 0 degrees

## 2023-05-31 ENCOUNTER — APPOINTMENT (OUTPATIENT)
Dept: ORTHOPEDIC SURGERY | Facility: CLINIC | Age: 58
End: 2023-05-31
Payer: COMMERCIAL

## 2023-05-31 PROCEDURE — 20610 DRAIN/INJ JOINT/BURSA W/O US: CPT | Mod: RT

## 2023-05-31 NOTE — ASSESSMENT
[FreeTextEntry1] : \par \par 05/11/2023  R knee gel- inj ordered. completed last visco series 04/2022\par Interested in repeating visco injection r knee. Previous injections  reduced pain, and increased function in walking and stairs.\par Obtain authorization for visco injections.\par \par 5/24/23: Euflexxa #1 right knee tolerated well.\par RTO in 1 week to continue.\par \par 5/31/23: Euflexxa #2 right knee tolerated well.\par RTO in 1 week to continue.\par \par Progress note completed by RAYMUNDO Bray under the direct supervision of Lauro Ba M.D.\par

## 2023-05-31 NOTE — HISTORY OF PRESENT ILLNESS
[] : This patient has had an injection before: yes [2] : 2 [Euflexxa] : Euflexxa [de-identified] : 5/31/23: Euflexxa #2 right knee. Symptoms continue. Denies complication with prior injection.\par \par 5/24/23: Follow up right knee. Symptoms continue. She has been approved for right knee Euflexxa injections.\par \par 5/10/23: 56 yo fm presents for a fu for her R knee OA. She saw signficant relief with last series last year [de-identified] : Euflexxa [de-identified] : 5/24/23 [de-identified] : RT knee

## 2023-05-31 NOTE — PROCEDURE
[FreeTextEntry3] : Viscosupplementation Injection: X-ray evidence of osteoarthritis on this or prior visit and patient has tried OTC's including aspirin, Ibuprofen, Aleve etc or prescription NSAIDS, and/or exercises at home and/ or physical therapy without satisfactory response. \par \par An injection of Euflexxa 2.5ml #2 was injected into the right knee(s) after verbal consent obtained. \par \par Patient has tried OTC's including aspirin, Ibuprofen, Aleve etc or prescription NSAIDS, and/or exercises at home and/ or physical therapy without satisfactory response and Patient has decreased mobility in the joint. The risks, benefits, and alternatives to cortisone injection were explained in full to the patient. Risks outlined include but are not limited to infection, sepsis, bleeding, scarring, skin discoloration, temporary increase in pain, syncopal episode, failure to resolve symptoms, allergic reaction, and symptom recurrence. Patient understands the risks. All questions were answered. After discussion of options, patient requested an injection. Oral informed consent was obtained. Sterile technique was utilized for the procedure including the preparation of the solutions used for the injection. Injection site was prepped with betadine and alcohol. Ethyl chloride sprayed topically. Sterile technique used. Patient tolerated procedure well. \par \par Post Procedure Instructions: Patient was advised to call if redness, pain, or fever occur. Apply ice for 15 min. every 2 hours for the next 12-24 hours as tolerated. Patient was advised to rest the joint(s) for 1-2 days.\par

## 2023-06-01 ENCOUNTER — APPOINTMENT (OUTPATIENT)
Dept: ORTHOPEDIC SURGERY | Facility: CLINIC | Age: 58
End: 2023-06-01
Payer: OTHER MISCELLANEOUS

## 2023-06-01 VITALS — WEIGHT: 170 LBS | BODY MASS INDEX: 28.32 KG/M2 | HEIGHT: 65 IN

## 2023-06-01 PROCEDURE — 99212 OFFICE O/P EST SF 10 MIN: CPT | Mod: 25

## 2023-06-01 PROCEDURE — 20610 DRAIN/INJ JOINT/BURSA W/O US: CPT | Mod: LT

## 2023-06-01 NOTE — PROCEDURE
[FreeTextEntry3] : Viscosupplementation Injection: X-ray evidence of osteoarthritis on this or prior visit and patient has tried OTC's including aspirin, Ibuprofen, Aleve etc or prescription NSAIDS, and/or exercises at home and/ or physical therapy without satisfactory response. \par \par An injection of Euflexxa 2.5ml #2 was injected into the left knee(s) after verbal consent obtained. \par \par Patient has tried OTC's including aspirin, Ibuprofen, Aleve etc or prescription NSAIDS, and/or exercises at home and/ or physical therapy without satisfactory response and Patient has decreased mobility in the joint. The risks, benefits, and alternatives to cortisone injection were explained in full to the patient. Risks outlined include but are not limited to infection, sepsis, bleeding, scarring, skin discoloration, temporary increase in pain, syncopal episode, failure to resolve symptoms, allergic reaction, and symptom recurrence. Patient understands the risks. All questions were answered. After discussion of options, patient requested an injection. Oral informed consent was obtained. Sterile technique was utilized for the procedure including the preparation of the solutions used for the injection. Injection site was prepped with betadine and alcohol. Ethyl chloride sprayed topically. Sterile technique used. Patient tolerated procedure well. \par \par Post Procedure Instructions: Patient was advised to call if redness, pain, or fever occur. Apply ice for 15 min. every 2 hours for the next 12-24 hours as tolerated. Patient was advised to rest the joint(s) for 1-2 days.\par

## 2023-06-01 NOTE — PHYSICAL EXAM
[Left] : left knee [4___] : hamstring 4[unfilled]/5 [] : no mild effusion [TWNoteComboBox7] : flexion 130 degrees [de-identified] : extension 0 degrees

## 2023-06-01 NOTE — ASSESSMENT
[FreeTextEntry1] : Completed Euflexxa series on 04/7/2023. \par \par 05/18/2023\par Interested in repeating euflexxa injection left knee. 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  path showed hurthle cell.  Pt followed by Dr. Mojica.  Last labs were on 11/11/21,  TSH, T3, T4 were normal.  A few weeks ago pt noticed a hard lump by the surgical site.  PT had US on Saturday at  in [place]. Results are pending. PT is losing her voice and she has globus sensation. Pt is on levothyroxine 150 mcg.  Pt is on a low iodine diet and she is scheduled for the [facility] in December, 2021.

## 2023-06-01 NOTE — HISTORY OF PRESENT ILLNESS
[] : This patient has had an injection before: yes [2] : 2 [Euflexxa] : Euflexxa [de-identified] : WC DOI 5/26/2021:\par \par 03/09/2022: 55YO female patient present for left knee pain. \par \par 3/31/22 L knee euflexxa #2.\par \par 4/7/22: Here for Euflexxa #3 L knee \par \par 05/18/2023 Here with return of left knee pain. \par \par 05/25/23: Follow up left knee. Symptoms continue. Euflexxa injections have been approved.\par \par 06/01/23: Euflexxa #2 left knee. Symptoms continue. Denies complication with prior injection.\par  [de-identified] : 5/25/23 [de-identified] : left knee

## 2023-06-07 ENCOUNTER — APPOINTMENT (OUTPATIENT)
Dept: ORTHOPEDIC SURGERY | Facility: CLINIC | Age: 58
End: 2023-06-07
Payer: COMMERCIAL

## 2023-06-07 PROCEDURE — 20610 DRAIN/INJ JOINT/BURSA W/O US: CPT | Mod: RT

## 2023-06-07 NOTE — ASSESSMENT
[FreeTextEntry1] : \par \par 05/11/2023  R knee gel- inj ordered. completed last visco series 04/2022\par Interested in repeating visco injection r knee. Previous injections  reduced pain, and increased function in walking and stairs.\par Obtain authorization for visco injections.\par \par 5/24/23: Euflexxa #1 right knee tolerated well.\par 5/31/23: Euflexxa #2 right knee tolerated well.\par \par 06/07/2023 euflexxa #3 right knee. \par Return in 6 weeks.\par \par The documentation recorded by the scribe accurately reflects the service I personally performed and the decisions made by me.\par I, Daron Peña, attest that this documentation has been prepared under the direction and in the presence of Provider Lauro Ba MD.\par

## 2023-06-07 NOTE — PROCEDURE
[FreeTextEntry3] : Viscosupplementation Injection: X-ray evidence of osteoarthritis on this or prior visit and patient has tried OTC's including aspirin, Ibuprofen, Aleve etc or prescription NSAIDS, and/or exercises at home and/ or physical therapy without satisfactory response. \par \par An injection of Euflexxa 2.5ml #3 was injected into the right knee(s) after verbal consent obtained. \par \par Patient has tried OTC's including aspirin, Ibuprofen, Aleve etc or prescription NSAIDS, and/or exercises at home and/ or physical therapy without satisfactory response and Patient has decreased mobility in the joint. The risks, benefits, and alternatives to cortisone injection were explained in full to the patient. Risks outlined include but are not limited to infection, sepsis, bleeding, scarring, skin discoloration, temporary increase in pain, syncopal episode, failure to resolve symptoms, allergic reaction, and symptom recurrence. Patient understands the risks. All questions were answered. After discussion of options, patient requested an injection. Oral informed consent was obtained. Sterile technique was utilized for the procedure including the preparation of the solutions used for the injection. Injection site was prepped with betadine and alcohol. Ethyl chloride sprayed topically. Sterile technique used. Patient tolerated procedure well. \par \par Post Procedure Instructions: Patient was advised to call if redness, pain, or fever occur. Apply ice for 15 min. every 2 hours for the next 12-24 hours as tolerated. Patient was advised to rest the joint(s) for 1-2 days.\par

## 2023-06-07 NOTE — HISTORY OF PRESENT ILLNESS
[] : This patient has had an injection before: yes [Euflexxa] : Euflexxa [3] : 3 [de-identified] : 06/07/2023 euflexxa #3 right knee. Symptoms continue. Denies complication with prior injection.\par \par 5/31/23: Euflexxa #2 right knee. Symptoms continue. Denies complication with prior injection.\par \par 5/24/23: Follow up right knee. Symptoms continue. She has been approved for right knee Euflexxa injections.\par \par 5/10/23: 56 yo fm presents for a fu for her R knee OA. She saw signficant relief with last series last year [FreeTextEntry1] : RT knee [de-identified] : Euflexxa [de-identified] : 5/31/23 [de-identified] : Right knee

## 2023-06-08 ENCOUNTER — APPOINTMENT (OUTPATIENT)
Dept: ORTHOPEDIC SURGERY | Facility: CLINIC | Age: 58
End: 2023-06-08
Payer: OTHER MISCELLANEOUS

## 2023-06-08 PROCEDURE — 20610 DRAIN/INJ JOINT/BURSA W/O US: CPT

## 2023-06-08 PROCEDURE — 99212 OFFICE O/P EST SF 10 MIN: CPT | Mod: 25

## 2023-06-08 NOTE — PROCEDURE
[FreeTextEntry3] : Viscosupplementation Injection: X-ray evidence of osteoarthritis on this or prior visit and patient has tried OTC's including aspirin, Ibuprofen, Aleve etc or prescription NSAIDS, and/or exercises at home and/ or physical therapy without satisfactory response. \par \par An injection of Euflexxa 2.5ml #3 was injected into the left knee(s) after verbal consent obtained. \par \par Patient has tried OTC's including aspirin, Ibuprofen, Aleve etc or prescription NSAIDS, and/or exercises at home and/ or physical therapy without satisfactory response and Patient has decreased mobility in the joint. The risks, benefits, and alternatives to cortisone injection were explained in full to the patient. Risks outlined include but are not limited to infection, sepsis, bleeding, scarring, skin discoloration, temporary increase in pain, syncopal episode, failure to resolve symptoms, allergic reaction, and symptom recurrence. Patient understands the risks. All questions were answered. After discussion of options, patient requested an injection. Oral informed consent was obtained. Sterile technique was utilized for the procedure including the preparation of the solutions used for the injection. Injection site was prepped with betadine and alcohol. Ethyl chloride sprayed topically. Sterile technique used. Patient tolerated procedure well. \par \par Post Procedure Instructions: Patient was advised to call if redness, pain, or fever occur. Apply ice for 15 min. every 2 hours for the next 12-24 hours as tolerated. Patient was advised to rest the joint(s) for 1-2 days.\par

## 2023-06-08 NOTE — PHYSICAL EXAM
[Left] : left knee [4___] : hamstring 4[unfilled]/5 [] : no mild effusion [TWNoteComboBox7] : flexion 130 degrees [de-identified] : extension 0 degrees

## 2023-06-08 NOTE — ASSESSMENT
[FreeTextEntry1] : Completed Euflexxa series on 04/7/2023. \par \par 05/18/2023\par Interested in repeating euflexxa injection left knee. Previous injections  reduced pain, and increased function in walking and stairs.\par Obtain authorization for visco injections.\par \par 5/25/23: Euflexxa #1 left knee tolerated well.\par RTO in 1 week to continue.\par \par 6/1/23:  Euflexxa #2 left knee tolerated well.\par RTO in 1 week to continue.\par \par 06/08/23: Euflexxa #3 left knee tolerated well.\par RTO in 6 weeks if symptoms persist.\par \par Progress note completed by RAYMUNDO Bray under the direct supervision of Lauro Ba M.D.\par \par

## 2023-06-08 NOTE — HISTORY OF PRESENT ILLNESS
[3] : 3 [Euflexxa] : Euflexxa [de-identified] : WC DOI 5/26/2021:\par \par 03/09/2022: 57YO female patient present for left knee pain. \par \par 3/31/22 L knee euflexxa #2.\par \par 4/7/22: Here for Euflexxa #3 L knee \par \par 05/18/2023 Here with return of left knee pain. \par \par 05/25/23: Follow up left knee. Symptoms continue. Euflexxa injections have been approved.\par \par 06/01/23: Euflexxa #2 left knee. Symptoms continue. Denies complication with prior injection.\par \par 06/08/2023 euflexxa #3 left knee. Symptoms continue. Denies complication with prior injection.\par  [] : no [de-identified] : 6/1/23 [de-identified] : left knee

## 2023-06-12 ENCOUNTER — NON-APPOINTMENT (OUTPATIENT)
Age: 58
End: 2023-06-12

## 2023-06-12 ENCOUNTER — APPOINTMENT (OUTPATIENT)
Dept: INTERNAL MEDICINE | Facility: CLINIC | Age: 58
End: 2023-06-12
Payer: COMMERCIAL

## 2023-06-12 VITALS
SYSTOLIC BLOOD PRESSURE: 118 MMHG | WEIGHT: 170 LBS | BODY MASS INDEX: 28.32 KG/M2 | DIASTOLIC BLOOD PRESSURE: 70 MMHG | HEIGHT: 65 IN | HEART RATE: 91 BPM | OXYGEN SATURATION: 98 % | TEMPERATURE: 98.4 F

## 2023-06-12 LAB
25(OH)D3 SERPL-MCNC: 48.3 NG/ML
ALBUMIN SERPL ELPH-MCNC: 4.7 G/DL
ALP BLD-CCNC: 78 U/L
ALT SERPL-CCNC: 27 U/L
ANION GAP SERPL CALC-SCNC: 14 MMOL/L
APPEARANCE: CLEAR
AST SERPL-CCNC: 22 U/L
BACTERIA: NEGATIVE /HPF
BILIRUB SERPL-MCNC: 0.4 MG/DL
BILIRUBIN URINE: NEGATIVE
BLOOD URINE: NEGATIVE
BUN SERPL-MCNC: 20 MG/DL
CALCIUM SERPL-MCNC: 9.7 MG/DL
CAST: 0 /LPF
CHLORIDE SERPL-SCNC: 105 MMOL/L
CHOLEST SERPL-MCNC: 193 MG/DL
CO2 SERPL-SCNC: 22 MMOL/L
COLOR: YELLOW
CREAT SERPL-MCNC: 0.68 MG/DL
EGFR: 102 ML/MIN/1.73M2
EPITHELIAL CELLS: 2 /HPF
ESTIMATED AVERAGE GLUCOSE: 114 MG/DL
GLUCOSE QUALITATIVE U: NEGATIVE MG/DL
GLUCOSE SERPL-MCNC: 97 MG/DL
HBA1C MFR BLD HPLC: 5.6 %
HDLC SERPL-MCNC: 69 MG/DL
KETONES URINE: NEGATIVE MG/DL
LDLC SERPL CALC-MCNC: 104 MG/DL
LEUKOCYTE ESTERASE URINE: NEGATIVE
MICROSCOPIC-UA: NORMAL
NITRITE URINE: NEGATIVE
NONHDLC SERPL-MCNC: 124 MG/DL
PH URINE: 6
POTASSIUM SERPL-SCNC: 4.6 MMOL/L
PROT SERPL-MCNC: 6.8 G/DL
PROTEIN URINE: NEGATIVE MG/DL
RED BLOOD CELLS URINE: 0 /HPF
SODIUM SERPL-SCNC: 142 MMOL/L
SPECIFIC GRAVITY URINE: 1.02
T4 FREE SERPL-MCNC: 1.1 NG/DL
TRIGL SERPL-MCNC: 99 MG/DL
TSH SERPL-ACNC: 2 UIU/ML
UROBILINOGEN URINE: 0.2 MG/DL
VIT B12 SERPL-MCNC: 733 PG/ML
WHITE BLOOD CELLS URINE: 1 /HPF

## 2023-06-12 PROCEDURE — 36415 COLL VENOUS BLD VENIPUNCTURE: CPT

## 2023-06-12 PROCEDURE — 93000 ELECTROCARDIOGRAM COMPLETE: CPT

## 2023-06-12 PROCEDURE — 99396 PREV VISIT EST AGE 40-64: CPT | Mod: 25

## 2023-06-12 NOTE — HEALTH RISK ASSESSMENT
[Good] : ~his/her~  mood as  good [No falls in past year] : Patient reported no falls in the past year [0] : 2) Feeling down, depressed, or hopeless: Not at all (0) [PHQ-2 Negative - No further assessment needed] : PHQ-2 Negative - No further assessment needed [[id number]] : 0 [Change in mental status noted] : No change in mental status noted [Language] : denies difficulty with language [Behavior] : denies difficulty with behavior [None] : None [] :  [Fully functional (bathing, dressing, toileting, transferring, walking, feeding)] : Fully functional (bathing, dressing, toileting, transferring, walking, feeding) [Fully functional (using the telephone, shopping, preparing meals, housekeeping, doing laundry, using] : Fully functional and needs no help or supervision to perform IADLs (using the telephone, shopping, preparing meals, housekeeping, doing laundry, using transportation, managing medications and managing finances) [Reports changes in hearing] : Reports no changes in hearing [Reports changes in vision] : Reports no changes in vision [Reports normal functional visual acuity (ie: able to read med bottle)] : Reports normal functional visual acuity

## 2023-06-12 NOTE — PLAN
[FreeTextEntry1] : Reviewed FBW with pt\par EKG NSR No change from prior EKG \par Pt up to date on Mammo with get current mammo done this year \par Colonoscopy up do date

## 2023-06-12 NOTE — HISTORY OF PRESENT ILLNESS
[FreeTextEntry1] : 56 y/o female presents to the office today for a physical exam with outside labs

## 2023-06-16 LAB
M TB IFN-G BLD-IMP: NEGATIVE
QUANTIFERON TB PLUS MITOGEN MINUS NIL: >10 IU/ML
QUANTIFERON TB PLUS NIL: 0.01 IU/ML
QUANTIFERON TB PLUS TB1 MINUS NIL: 0 IU/ML
QUANTIFERON TB PLUS TB2 MINUS NIL: 0 IU/ML

## 2023-08-30 ENCOUNTER — APPOINTMENT (OUTPATIENT)
Dept: ORTHOPEDIC SURGERY | Facility: CLINIC | Age: 58
End: 2023-08-30
Payer: COMMERCIAL

## 2023-08-30 VITALS — BODY MASS INDEX: 28.32 KG/M2 | HEIGHT: 65 IN | WEIGHT: 170 LBS

## 2023-08-30 DIAGNOSIS — M17.12 UNILATERAL PRIMARY OSTEOARTHRITIS, LEFT KNEE: ICD-10-CM

## 2023-08-30 PROCEDURE — ZZZZZ: CPT

## 2023-08-30 NOTE — PHYSICAL EXAM
[Right] : right knee [] : medial joint line tenderness [NL (0)] : extension 0 degrees [5___] : hamstring 5[unfilled]/5 [TWNoteComboBox7] : flexion 120 degrees [de-identified] : extension 0 degrees

## 2023-08-30 NOTE — PROCEDURE
[FreeTextEntry3] : Large Joint Injection was performed to right knee because of pain and inflammation.  Celestone: An injection of Celestone 6 mg ,  Lidocaine 1%: 2 cc.   Patient has tried OTC's including aspirin, Ibuprofen, Aleve etc or prescription NSAIDS, and/or exercises at home and/ or physical therapy without satisfactory response and Patient has decreased mobility in the joint. The risks, benefits, and alternatives to cortisone injection were explained in full to the patient. Risks outlined include but are not limited to infection, sepsis, bleeding, scarring, skin discoloration, temporary increase in pain, syncopal episode, failure to resolve symptoms, allergic reaction, symptom recurrence, and elevation of blood sugar in diabetics. Patient understood the risks. All questions were answered. After discussion of options, patient requested an injection. Oral informed consent was obtained. Sterile technique was utilized for the procedure including the preparation of the solutions used for the injection. Injection site was prepped with betadine and alcohol. Ethyl chloride sprayed topically. Sterile technique used. Patient tolerated procedure well.   Post Procedure Instructions: Patient was advised to call if redness, pain, or fever occur. Apply ice for 15 min. every 2 hours for the next 12-24 hours as tolerated. Patient was advised to rest the joint(s) for 1-2 days.

## 2023-08-30 NOTE — HISTORY OF PRESENT ILLNESS
[7] : 7 [0] : 0 [de-identified] : 06/07/2023 euflexxa #3 right knee. Symptoms continue. Denies complication with prior injection.  5/31/23: Euflexxa #2 right knee. Symptoms continue. Denies complication with prior injection.  5/24/23: Follow up right knee. Symptoms continue. She has been approved for right knee Euflexxa injections.  5/10/23: 58 yo fm presents for a fu for her R knee OA. She saw signficant relief with last series last year  08/30/23: Follow up left knee. She had been doing well following visco, but notes increased pain in the medial knee for the past 2 weeks. No new injury, but had been on vacation. Denies buckling. She has been taking Motrin and using ice with little relief. [FreeTextEntry1] : RT knee [FreeTextEntry5] : Pt is here for right knee follow up. States euflexxa gave good relief until 3 weeks ago. Pain has since worsened and is having issues bending knee/using stairs.  [de-identified] : Euflexxa

## 2023-08-30 NOTE — ASSESSMENT
[FreeTextEntry1] :   05/11/2023  R knee gel- inj ordered. completed last visco series 04/2022 Interested in repeating visco injection r knee. Previous injections  reduced pain, and increased function in walking and stairs. Obtain authorization for visco injections.  5/24/23: Euflexxa #1 right knee tolerated well. 5/31/23: Euflexxa #2 right knee tolerated well.  06/07/2023 euflexxa #3 right knee.  Return in 6 weeks.  08/30/23: Treatment options reviewed. Right knee CSI tolerated well. Recommend PT. Return in 3-4 weeks.  Progress note completed by RAYMUNDO Bray under the direct supervision of Lauro Ba M.D.

## 2023-09-27 ENCOUNTER — APPOINTMENT (OUTPATIENT)
Dept: ORTHOPEDIC SURGERY | Facility: CLINIC | Age: 58
End: 2023-09-27

## 2024-02-08 NOTE — HISTORY OF PRESENT ILLNESS
[FreeTextEntry1] : 54 year old female presenting with left foot pain. The patient’s pain is noted to be a 7-10/10. The patient c/o constant heel and foot pain since 8/1/19. The patient describes their pain as sharp, throbbing, cramping, shooting, and intense. The pain is made worse when sitting. The patient had 3 previous cortisone injections to the bottom of her heel. The patient has been attending physical therapy for this issue. The patient's job requires her to remain on her feet for long periods of time. She is currently taking NSAIDs and utilizing ice. No other complaints at this time.\par \par \par  
Female

## 2024-02-28 ENCOUNTER — APPOINTMENT (OUTPATIENT)
Dept: INTERNAL MEDICINE | Facility: CLINIC | Age: 59
End: 2024-02-28
Payer: COMMERCIAL

## 2024-02-28 VITALS
SYSTOLIC BLOOD PRESSURE: 139 MMHG | WEIGHT: 179 LBS | HEIGHT: 65 IN | BODY MASS INDEX: 29.82 KG/M2 | HEART RATE: 96 BPM | TEMPERATURE: 98.5 F | DIASTOLIC BLOOD PRESSURE: 85 MMHG | OXYGEN SATURATION: 97 % | RESPIRATION RATE: 16 BRPM

## 2024-02-28 DIAGNOSIS — Z78.9 OTHER SPECIFIED HEALTH STATUS: ICD-10-CM

## 2024-02-28 DIAGNOSIS — M17.11 UNILATERAL PRIMARY OSTEOARTHRITIS, RIGHT KNEE: ICD-10-CM

## 2024-02-28 DIAGNOSIS — Z82.49 FAMILY HISTORY OF ISCHEMIC HEART DISEASE AND OTHER DISEASES OF THE CIRCULATORY SYSTEM: ICD-10-CM

## 2024-02-28 DIAGNOSIS — J00 ACUTE NASOPHARYNGITIS [COMMON COLD]: ICD-10-CM

## 2024-02-28 DIAGNOSIS — R09.82 POSTNASAL DRIP: ICD-10-CM

## 2024-02-28 PROCEDURE — 99214 OFFICE O/P EST MOD 30 MIN: CPT

## 2024-02-28 NOTE — PHYSICAL EXAM
[Normal] : no posterior cervical lymphadenopathy and no anterior cervical lymphadenopathy [de-identified] : His mucosa erythema and swelling.Postnasal drip present

## 2024-02-28 NOTE — HISTORY OF PRESENT ILLNESS
[FreeTextEntry8] :  Patient presents the office today with a postnasal drip and a cough that has been occurring for the last week Patient has had this in the past before use Flonase and Astelin with improvement she has restarted medication but has no improvement with the postnasal drip clearing of throat and coughing Patient has no fevers no chills feels well otherwise  Patient is still suffering with chronic knee pain has been getting gel shots

## 2024-02-28 NOTE — PLAN
[FreeTextEntry1] : Patient's right knee pain is worsening patient will follow-up with orthopedist to discuss possible knee replacement Patient will continue with Flonase and Astelin Patient will start Medrol Dosepak as given above Patient will start Tessalon Perles during the day and Phenergan DM at bedtime

## 2024-02-29 DIAGNOSIS — R05.9 COUGH, UNSPECIFIED: ICD-10-CM

## 2024-03-19 ENCOUNTER — APPOINTMENT (OUTPATIENT)
Dept: ORTHOPEDIC SURGERY | Facility: CLINIC | Age: 59
End: 2024-03-19
Payer: COMMERCIAL

## 2024-03-19 VITALS
WEIGHT: 179 LBS | DIASTOLIC BLOOD PRESSURE: 84 MMHG | BODY MASS INDEX: 29.82 KG/M2 | HEIGHT: 65 IN | SYSTOLIC BLOOD PRESSURE: 146 MMHG

## 2024-03-19 PROCEDURE — 73564 X-RAY EXAM KNEE 4 OR MORE: CPT | Mod: RT

## 2024-03-19 PROCEDURE — 99215 OFFICE O/P EST HI 40 MIN: CPT

## 2024-03-19 RX ORDER — BENZONATATE 100 MG/1
100 CAPSULE ORAL
Qty: 30 | Refills: 0 | Status: DISCONTINUED | COMMUNITY
Start: 2024-02-28 | End: 2024-03-19

## 2024-03-19 RX ORDER — PROMETHAZINE HYDROCHLORIDE AND DEXTROMETHORPHAN HYDROBROMIDE ORAL SOLUTION 15; 6.25 MG/5ML; MG/5ML
6.25-15 SOLUTION ORAL
Qty: 50 | Refills: 0 | Status: DISCONTINUED | COMMUNITY
Start: 2024-02-28 | End: 2024-03-19

## 2024-03-19 RX ORDER — METHYLPREDNISOLONE 4 MG/1
4 TABLET ORAL
Qty: 1 | Refills: 0 | Status: DISCONTINUED | COMMUNITY
Start: 2024-02-29 | End: 2024-03-19

## 2024-03-19 RX ORDER — SODIUM SULFATE, MAGNESIUM SULFATE, AND POTASSIUM CHLORIDE 17.75; 2.7; 2.25 G/1; G/1; G/1
1479-225-188 TABLET ORAL
Qty: 1 | Refills: 0 | Status: DISCONTINUED | COMMUNITY
Start: 2023-01-05 | End: 2024-03-19

## 2024-03-19 NOTE — DISCUSSION/SUMMARY
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 The patient has exhausted a minimum of 3 months conservative treatment including prior injections, physical therapy, over the counter NSIADS and pain medication where indicated. In addition, the patient's quality of life is diminished due to significant chronic pain. The patient is having difficulty ambulating, descending stairs, and rising from a seated position.   Based upon the patient's continued symptoms and failure to respond to conservative treatment, I have recommended a right total knee replacement for this patient. A long discussion took place with the patient describing what a total joint replacement is and what the procedure would entail. A knee model, similar to the implants that will be used during the operation, was utilized to demonstrate the implants. Choices of implant manufactures were discussed and reviewed. The ability to secure the implant utilizing cement or cementless (press fit) fixation was discussed. The patient agrees with the plan of care as well as the use of implants.   The hospitalization and post-operative care and rehabilitation were also discussed. The use of perioperative antibiotics and DVT prophylaxis were discussed. The risk, benefits and alternatives to a surgical intervention were discussed at length with the patient. The patient was also advised of risks related to the medical comorbidities and elevated body mass index (BMI). A lengthy discussion took place to review the most common complications including but not limited to: deep vein thrombosis, pulmonary embolus, heart attack, stroke, infection, wound breakdown, numbness, damage to nerves, tendon, muscles, arteries or other blood vessels, death and other possible complications from anesthesia. The patient was told that we will take steps to minimize these risks by using sterile technique, antibiotics and DVT prophylaxis when appropriate and follow the patient postoperatively in the office setting to monitor progress. The possibility of recurrent pain, no improvement in pain and actual worsening of pain were also discussed with the patient.   The discharge plan of care focused on the patient going home following surgery. The patient was encouraged to make the necessary arrangements to have someone stay with them when they are discharged home. Following discharge, a home care nurse will visit the patient. The home care nurse will open your home care case and request home physical therapy services. Home physical therapy will commence following discharge provided it is appropriate and covered by the health insurance benefit plan.   The benefits of surgery were discussed with the patient including the potential for improving the patient's current clinical condition through operative intervention. Alternatives to surgical intervention including continued conservative management were also discussed in detail. All questions were answered to the satisfaction of the patient. The treatment plan of care, as well as a model of a total knee equivalent to the one that will be used for their total joint replacement, was shared with the patient. The patient participated and agreed to the plan of care as well as the use of the recommended implants for their total joint replacement surgery.   We discussed that the knee replacement will be done with robotic assistance to enhance accuracy and dynamic joint balancing.

## 2024-03-19 NOTE — CONSULT LETTER
[Dear  ___] : Dear  [unfilled], [Consult Letter:] : I had the pleasure of evaluating your patient, [unfilled]. [Please see my note below.] : Please see my note below. [Sincerely,] : Sincerely, [Consult Closing:] : Thank you very much for allowing me to participate in the care of this patient.  If you have any questions, please do not hesitate to contact me. [FreeTextEntry2] : GISELLE MCKINNON MD [FreeTextEntry3] : Remi Baptiste MD Chief of Joint Replacement Primary & Revision Hip and Knee Replacement  [facility] [place]

## 2024-03-19 NOTE — HISTORY OF PRESENT ILLNESS
[de-identified] : Ms. VIRI DIXON is a 58 year old female presenting for evaluation of longstanding right knee pain. She is status post right knee arthroscopy x 2 for meniscectomies. Patient also has had multiple rounds of gel and cortisone injections. Her most recent injection was cortisone in August 2023. She reports the injections are no longer working. She also tried PT and NSAIDs without improvement. She generalizes the pain to the entire knee and notes it is worse wit lau weightbearing activity.

## 2024-03-19 NOTE — ADDENDUM
[FreeTextEntry1] : This note was authored by Felipe Sanford working as a medical scribe for Dr. Remi Baptiste. The note was reviewed, edited, and revised by Dr. Remi Baptiste whom is in agreement with the exam findings, imaging findings, and treatment plan. 03/19/2024

## 2024-03-19 NOTE — PHYSICAL EXAM
[de-identified] : The patient appears well nourished and in no apparent distress.  The patient is alert and oriented to person, place, and time.   Affect and mood appear normal. The head is normocephalic and atraumatic.  The eyes reveal normal sclera and extra ocular muscles are intact. The tongue is midline with no apparent lesions.  Skin shows normal turgor with no evidence of eczema or psoriasis.  No respiratory distress noted.  Sensation grossly intact.		  [de-identified] : Exam of the right knee shows a varus alignment which is partially correctable, 3 mm laxity of the LCL, -7 to 110 degrees of flexion measured with a goniometer. There is boggy synovitis. There are healed prior portal sites.  5/5 motor strength bilaterally distally. Sensation intact distally.		  [de-identified] : X-ray: 4 views of the right knee demonstrate bone on bone varus arthritis.

## 2024-03-28 ENCOUNTER — NON-APPOINTMENT (OUTPATIENT)
Age: 59
End: 2024-03-28

## 2024-03-28 ENCOUNTER — APPOINTMENT (OUTPATIENT)
Dept: CT IMAGING | Facility: CLINIC | Age: 59
End: 2024-03-28
Payer: COMMERCIAL

## 2024-03-28 PROCEDURE — 73700 CT LOWER EXTREMITY W/O DYE: CPT | Mod: RT

## 2024-04-25 ENCOUNTER — OUTPATIENT (OUTPATIENT)
Dept: OUTPATIENT SERVICES | Facility: HOSPITAL | Age: 59
LOS: 1 days | End: 2024-04-25
Payer: COMMERCIAL

## 2024-04-25 VITALS
HEIGHT: 65 IN | WEIGHT: 182.98 LBS | HEART RATE: 78 BPM | RESPIRATION RATE: 18 BRPM | TEMPERATURE: 98 F | OXYGEN SATURATION: 98 % | SYSTOLIC BLOOD PRESSURE: 118 MMHG | DIASTOLIC BLOOD PRESSURE: 78 MMHG

## 2024-04-25 DIAGNOSIS — M17.11 UNILATERAL PRIMARY OSTEOARTHRITIS, RIGHT KNEE: ICD-10-CM

## 2024-04-25 DIAGNOSIS — Z98.890 OTHER SPECIFIED POSTPROCEDURAL STATES: Chronic | ICD-10-CM

## 2024-04-25 DIAGNOSIS — Z90.49 ACQUIRED ABSENCE OF OTHER SPECIFIED PARTS OF DIGESTIVE TRACT: Chronic | ICD-10-CM

## 2024-04-25 DIAGNOSIS — Z01.818 ENCOUNTER FOR OTHER PREPROCEDURAL EXAMINATION: ICD-10-CM

## 2024-04-25 DIAGNOSIS — Z29.9 ENCOUNTER FOR PROPHYLACTIC MEASURES, UNSPECIFIED: ICD-10-CM

## 2024-04-25 DIAGNOSIS — Z13.89 ENCOUNTER FOR SCREENING FOR OTHER DISORDER: ICD-10-CM

## 2024-04-25 LAB
A1C WITH ESTIMATED AVERAGE GLUCOSE RESULT: 5.2 % — SIGNIFICANT CHANGE UP (ref 4–5.6)
ANION GAP SERPL CALC-SCNC: 12 MMOL/L — SIGNIFICANT CHANGE UP (ref 5–17)
APTT BLD: 29.1 SEC — SIGNIFICANT CHANGE UP (ref 24.5–35.6)
BASOPHILS # BLD AUTO: 0.04 K/UL — SIGNIFICANT CHANGE UP (ref 0–0.2)
BASOPHILS NFR BLD AUTO: 0.5 % — SIGNIFICANT CHANGE UP (ref 0–2)
BLD GP AB SCN SERPL QL: SIGNIFICANT CHANGE UP
BUN SERPL-MCNC: 18.4 MG/DL — SIGNIFICANT CHANGE UP (ref 8–20)
CALCIUM SERPL-MCNC: 9.5 MG/DL — SIGNIFICANT CHANGE UP (ref 8.4–10.5)
CHLORIDE SERPL-SCNC: 104 MMOL/L — SIGNIFICANT CHANGE UP (ref 96–108)
CO2 SERPL-SCNC: 25 MMOL/L — SIGNIFICANT CHANGE UP (ref 22–29)
CREAT SERPL-MCNC: 0.63 MG/DL — SIGNIFICANT CHANGE UP (ref 0.5–1.3)
EGFR: 103 ML/MIN/1.73M2 — SIGNIFICANT CHANGE UP
EOSINOPHIL # BLD AUTO: 0.27 K/UL — SIGNIFICANT CHANGE UP (ref 0–0.5)
EOSINOPHIL NFR BLD AUTO: 3.3 % — SIGNIFICANT CHANGE UP (ref 0–6)
ESTIMATED AVERAGE GLUCOSE: 103 MG/DL — SIGNIFICANT CHANGE UP (ref 68–114)
GLUCOSE SERPL-MCNC: 96 MG/DL — SIGNIFICANT CHANGE UP (ref 70–99)
HCT VFR BLD CALC: 38 % — SIGNIFICANT CHANGE UP (ref 34.5–45)
HGB BLD-MCNC: 12.7 G/DL — SIGNIFICANT CHANGE UP (ref 11.5–15.5)
IMM GRANULOCYTES NFR BLD AUTO: 0.2 % — SIGNIFICANT CHANGE UP (ref 0–0.9)
INR BLD: 1 RATIO — SIGNIFICANT CHANGE UP (ref 0.85–1.18)
LYMPHOCYTES # BLD AUTO: 2.55 K/UL — SIGNIFICANT CHANGE UP (ref 1–3.3)
LYMPHOCYTES # BLD AUTO: 31.4 % — SIGNIFICANT CHANGE UP (ref 13–44)
MCHC RBC-ENTMCNC: 29.3 PG — SIGNIFICANT CHANGE UP (ref 27–34)
MCHC RBC-ENTMCNC: 33.4 GM/DL — SIGNIFICANT CHANGE UP (ref 32–36)
MCV RBC AUTO: 87.8 FL — SIGNIFICANT CHANGE UP (ref 80–100)
MONOCYTES # BLD AUTO: 0.77 K/UL — SIGNIFICANT CHANGE UP (ref 0–0.9)
MONOCYTES NFR BLD AUTO: 9.5 % — SIGNIFICANT CHANGE UP (ref 2–14)
NEUTROPHILS # BLD AUTO: 4.48 K/UL — SIGNIFICANT CHANGE UP (ref 1.8–7.4)
NEUTROPHILS NFR BLD AUTO: 55.1 % — SIGNIFICANT CHANGE UP (ref 43–77)
PLATELET # BLD AUTO: 288 K/UL — SIGNIFICANT CHANGE UP (ref 150–400)
POTASSIUM SERPL-MCNC: 4.3 MMOL/L — SIGNIFICANT CHANGE UP (ref 3.5–5.3)
POTASSIUM SERPL-SCNC: 4.3 MMOL/L — SIGNIFICANT CHANGE UP (ref 3.5–5.3)
PROTHROM AB SERPL-ACNC: 11.1 SEC — SIGNIFICANT CHANGE UP (ref 9.5–13)
RBC # BLD: 4.33 M/UL — SIGNIFICANT CHANGE UP (ref 3.8–5.2)
RBC # FLD: 12.4 % — SIGNIFICANT CHANGE UP (ref 10.3–14.5)
SODIUM SERPL-SCNC: 141 MMOL/L — SIGNIFICANT CHANGE UP (ref 135–145)
WBC # BLD: 8.13 K/UL — SIGNIFICANT CHANGE UP (ref 3.8–10.5)
WBC # FLD AUTO: 8.13 K/UL — SIGNIFICANT CHANGE UP (ref 3.8–10.5)

## 2024-04-25 PROCEDURE — 85025 COMPLETE CBC W/AUTO DIFF WBC: CPT

## 2024-04-25 PROCEDURE — 83036 HEMOGLOBIN GLYCOSYLATED A1C: CPT

## 2024-04-25 PROCEDURE — 86901 BLOOD TYPING SEROLOGIC RH(D): CPT

## 2024-04-25 PROCEDURE — 87641 MR-STAPH DNA AMP PROBE: CPT

## 2024-04-25 PROCEDURE — 93005 ELECTROCARDIOGRAM TRACING: CPT

## 2024-04-25 PROCEDURE — 80048 BASIC METABOLIC PNL TOTAL CA: CPT

## 2024-04-25 PROCEDURE — 85610 PROTHROMBIN TIME: CPT

## 2024-04-25 PROCEDURE — 36415 COLL VENOUS BLD VENIPUNCTURE: CPT

## 2024-04-25 PROCEDURE — 86850 RBC ANTIBODY SCREEN: CPT

## 2024-04-25 PROCEDURE — G0463: CPT

## 2024-04-25 PROCEDURE — 87640 STAPH A DNA AMP PROBE: CPT

## 2024-04-25 PROCEDURE — 86900 BLOOD TYPING SEROLOGIC ABO: CPT

## 2024-04-25 PROCEDURE — 85730 THROMBOPLASTIN TIME PARTIAL: CPT

## 2024-04-25 PROCEDURE — 93010 ELECTROCARDIOGRAM REPORT: CPT

## 2024-04-25 NOTE — H&P PST ADULT - PROBLEM SELECTOR PLAN 1
57 yo with No PMH now with right knee osteoarthritis scheduled for right total knee replacement on 5/16/2024.    -Medical evaluation pending  - Patient educated on ERP protocol (written/verbal)- verbalized understanding  -Educated on NSAIDS, multivitamins and herbals that increase the risk of bleeding and need to be stopped 5 days before procedure  -Educated on infection prevention  -Tylenol can be taken if needed for pain  -Verbalized understanding of all instructions.

## 2024-04-25 NOTE — H&P PST ADULT - NSICDXFAMILYHX_GEN_ALL_CORE_FT
FAMILY HISTORY:  Grandparent  Still living? Unknown  FH: heart disease, Age at diagnosis: Age Unknown  FHx: diabetes mellitus, Age at diagnosis: Age Unknown

## 2024-04-25 NOTE — H&P PST ADULT - ASSESSMENT
59 yo with No PMH now with right knee osteoarthritis scheduled for right total knee replacement on 2024.    -Medical evaluation pending  - Patient educated on ERP protocol (written/verbal)- verbalized understanding  -Educated on NSAIDS, multivitamins and herbals that increase the risk of bleeding and need to be stopped 5 days before procedure  -Educated on infection prevention  -Tylenol can be taken if needed for pain  -Verbalized understanding of all instructions.      OPIOID RISK TOOL    JONATHAN EACH BOX THAT APPLIES AND ADD TOTALS AT THE END    FAMILY HISTORY OF SUBSTANCE ABUSE                 FEMALE         MALE                                                Alcohol                             [  ]1 pt          [  ]3pts                                               Illegal Durgs                     [  ]2 pts        [  ]3pts                                               Rx Drugs                           [  ]4 pts        [  ]4 pts    PERSONAL HISTORY OF SUBSTANCE ABUSE                                                                                          Alcohol                             [  ]3 pts       [  ]3 pts                                               Illegal Drugs                     [  ]4 pts        [  ]4 pts                                               Rx Drugs                           [  ]5 pts        [  ]5 pts    AGE BETWEEN 16-45 YEARS                                      [  ]1 pt         [  ]1 pt    HISTORY OF PREADOLESCENT   SEXUAL ABUSE                                                             [  ]3 pts        [  ]0pts    PSYCHOLOGICAL DISEASE                     ADD, OCD, Bipolar, Schizophrenia        [  ]2 pts         [  ]2 pts                      Depression                                               [  ]1 pt           [  ]1 pt           SCORING TOTAL   (add numbers and type here)              0                                     A score of 3 or lower indicated LOW risk for future opioid abuse  A score of 4 to 7 indicated moderate risk for future opioid abuse  A score of 8 or higher indicates a high risk for opioid abuse      CAPRINI SCORE [CLOT]    AGE RELATED RISK FACTORS                                                       MOBILITY RELATED FACTORS  [ x] Age 41-60 years                                            (1 Point)                  [ ] Bed rest                                                        (1 Point)  [ ] Age: 61-74 years                                           (2 Points)                 [ ] Plaster cast                                                   (2 Points)  [ ] Age= 75 years                                              (3 Points)                 [ ] Bed bound for more than 72 hours                 (2 Points)    DISEASE RELATED RISK FACTORS                                               GENDER SPECIFIC FACTORS  [ ] Edema in the lower extremities                       (1 Point)                  [ ] Pregnancy                                                     (1 Point)  [ ] Varicose veins                                               (1 Point)                  [ ] Post-partum < 6 weeks                                   (1 Point)             [ x] BMI > 25 Kg/m2                                            (1 Point)                  [ ] Hormonal therapy  or oral contraception          (1 Point)                 [ ] Sepsis (in the previous month)                        (1 Point)                  [ ] History of pregnancy complications                 (1 point)  [ ] Pneumonia or serious lung disease                                               [ ] Unexplained or recurrent                     (1 Point)           (in the previous month)                               (1 Point)  [ ] Abnormal pulmonary function test                     (1 Point)                 SURGERY RELATED RISK FACTORS  [ ] Acute myocardial infarction                              (1 Point)                 [ ]  Section                                             (1 Point)  [ ] Congestive heart failure (in the previous month)  (1 Point)               [ ] Minor surgery                                                  (1 Point)   [ ] Inflammatory bowel disease                             (1 Point)                 [ ] Arthroscopic surgery                                        (2 Points)  [ ] Central venous access                                      (2 Points)                [x ] General surgery lasting more than 45 minutes   (2 Points)       [ ] Stroke (in the previous month)                          (5 Points)               [ ] Elective arthroplasty                                         (5 Points)                                                                                                                                               HEMATOLOGY RELATED FACTORS                                                 TRAUMA RELATED RISK FACTORS  [ ] Prior episodes of VTE                                     (3 Points)                [ ] Fracture of the hip, pelvis, or leg                       (5 Points)  [ ] Positive family history for VTE                         (3 Points)                 [ ] Acute spinal cord injury (in the previous month)  (5 Points)  [ ] Prothrombin 02904 A                                     (3 Points)                 [ ] Paralysis  (less than 1 month)                             (5 Points)  [ ] Factor V Leiden                                             (3 Points)                  [ ] Multiple Trauma within 1 month                        (5 Points)  [ ] Lupus anticoagulants                                     (3 Points)                                                           [ ] Anticardiolipin antibodies                               (3 Points)                                                       [ ] High homocysteine in the blood                      (3 Points)                                             [ ] Other congenital or acquired thrombophilia      (3 Points)                                                [ ] Heparin induced thrombocytopenia                  (3 Points)                                          Total Score [     5     ]    Caprini Score 0 - 2:  Low Risk, No VTE Prophylaxis required for most patients, encourage ambulation  Caprini Score 3 - 6:  At Risk, pharmacologic VTE prophylaxis is indicated for most patients (in the absence of a contraindication)  Caprini Score Greater than or = 7:  High Risk, pharmacologic VTE prophylaxis is indicated for most patients (in the absence of a contraindication)

## 2024-04-25 NOTE — H&P PST ADULT - HISTORY OF PRESENT ILLNESS
59 yo with no PMH presents to PST today. Pt. reports pain to right knee for about 8 years and pain is becoming worse. Described pain as stabbing with radiation to right hip and right foot. Also c/o mucle spasms throughout the night impacting her sleep. Received many gel injections in the past with relief for 6 months to 1 year. Reports received cortisone injections previously  with not much relief as the gel injection. Pt. reports pain is intermittent. Periods of no movement  have been exacerbating pain. Walking and moving around alleviate pain. Pain levels include a current pain level of 0/10, a minimum pain level of 0/10 and a maximum pain level of 10/10. Pt. Self ambulates. Denies taking any pain medications Uses ice pack with some relief.  59 yo with no PMH presents to PST today. Pt. reports pain to right knee for about 8 years and pain is becoming worse. Described pain as stabbing with radiation to right hip and right foot. Also c/o muscle spasms throughout the night impacting her sleep. Received many gel injections in the past with relief for 6 months to 1 year. Reports received cortisone injections previously  with not much relief as the gel injection. Pt. reports pain is intermittent. Periods of no movement  have been exacerbating pain. Walking and moving around alleviate pain. Pain levels include a current pain level of 0/10, a minimum pain level of 0/10 and a maximum pain level of 10/10. Pt. Self ambulates. Denies taking any pain medications Uses ice pack with some relief. Scheduled for right total knee replacement on 5/16/2024.

## 2024-04-25 NOTE — H&P PST ADULT - NSICDXPASTSURGICALHX_GEN_ALL_CORE_FT
PAST SURGICAL HISTORY:  H/O arthroscopy of knee     S/P cholecystectomy     S/P shoulder surgery     S/P wrist surgery

## 2024-04-26 DIAGNOSIS — Z96.651 PRESENCE OF RIGHT ARTIFICIAL KNEE JOINT: ICD-10-CM

## 2024-04-26 LAB
MRSA PCR RESULT.: SIGNIFICANT CHANGE UP
S AUREUS DNA NOSE QL NAA+PROBE: SIGNIFICANT CHANGE UP

## 2024-05-03 ENCOUNTER — APPOINTMENT (OUTPATIENT)
Dept: ORTHOPEDIC SURGERY | Facility: CLINIC | Age: 59
End: 2024-05-03
Payer: COMMERCIAL

## 2024-05-03 VITALS — BODY MASS INDEX: 30.49 KG/M2 | WEIGHT: 183 LBS | HEIGHT: 65 IN

## 2024-05-03 DIAGNOSIS — M17.11 UNILATERAL PRIMARY OSTEOARTHRITIS, RIGHT KNEE: ICD-10-CM

## 2024-05-03 PROCEDURE — G2211 COMPLEX E/M VISIT ADD ON: CPT | Mod: NC,1L

## 2024-05-03 PROCEDURE — 99213 OFFICE O/P EST LOW 20 MIN: CPT

## 2024-05-03 RX ORDER — PANTOPRAZOLE 40 MG/1
40 TABLET, DELAYED RELEASE ORAL
Qty: 42 | Refills: 0 | Status: ACTIVE | COMMUNITY
Start: 2024-05-03 | End: 1900-01-01

## 2024-05-03 RX ORDER — CELECOXIB 200 MG/1
200 CAPSULE ORAL
Qty: 42 | Refills: 0 | Status: ACTIVE | COMMUNITY
Start: 2024-05-03 | End: 1900-01-01

## 2024-05-03 RX ORDER — CEPHALEXIN 500 MG/1
500 TABLET ORAL 4 TIMES DAILY
Qty: 12 | Refills: 0 | Status: ACTIVE | COMMUNITY
Start: 2024-05-03 | End: 1900-01-01

## 2024-05-03 RX ORDER — ASPIRIN 81 MG
81 TABLET, DELAYED RELEASE (ENTERIC COATED) ORAL
Qty: 84 | Refills: 0 | Status: ACTIVE | COMMUNITY
Start: 2024-05-03 | End: 1900-01-01

## 2024-05-03 RX ORDER — ACETAMINOPHEN 500 MG/1
500 TABLET, COATED ORAL
Qty: 90 | Refills: 0 | Status: ACTIVE | COMMUNITY
Start: 2024-05-03 | End: 1900-01-01

## 2024-05-03 RX ORDER — TRAMADOL HYDROCHLORIDE 50 MG/1
50 TABLET, COATED ORAL
Qty: 42 | Refills: 0 | Status: ACTIVE | COMMUNITY
Start: 2024-05-03 | End: 1900-01-01

## 2024-05-03 RX ORDER — SENNOSIDES 8.6MG AND DOCUSATE SODIUM 50MG 8.6; 5 MG/1; MG/1
8.6-5 TABLET, FILM COATED ORAL DAILY
Qty: 60 | Refills: 0 | Status: ACTIVE | COMMUNITY
Start: 2024-05-03 | End: 1900-01-01

## 2024-05-03 NOTE — PHYSICAL EXAM
LOV 10/9/18   [de-identified] : The patient appears well nourished and in no apparent distress.  The patient is alert and oriented to person, place, and time.   Affect and mood appear normal. The head is normocephalic and atraumatic.  The eyes reveal normal sclera and extra ocular muscles are intact. The tongue is midline with no apparent lesions.  Skin shows normal turgor with no evidence of eczema or psoriasis.  No respiratory distress noted.  Sensation grossly intact.		  [de-identified] : Exam of the right knee shows a varus alignment which is partially correctable, 3 mm laxity of the LCL, -7 to 110 degrees of flexion measured with a goniometer. There is boggy synovitis. There are healed prior portal sites.  5/5 motor strength bilaterally distally. Sensation intact distally.		  [de-identified] : Prior X-ray: 4 views of the right knee demonstrate bone on bone varus arthritis.

## 2024-05-03 NOTE — CONSULT LETTER
[Dear  ___] : Dear  [unfilled], [Consult Letter:] : I had the pleasure of evaluating your patient, [unfilled]. [Please see my note below.] : Please see my note below. [Consult Closing:] : Thank you very much for allowing me to participate in the care of this patient.  If you have any questions, please do not hesitate to contact me. [Sincerely,] : Sincerely, [FreeTextEntry2] : GISELLE MCKINNON MD [FreeTextEntry3] : Remi Baptiste MD Chief of Joint Replacement Primary & Revision Hip and Knee Replacement  [facility] [place]

## 2024-05-03 NOTE — HISTORY OF PRESENT ILLNESS
[de-identified] : Ms. VIRI DIXON is a 58 year old female presenting for evaluation of longstanding right knee pain. She is status post right knee arthroscopy x 2 for meniscectomies. Patient also has had multiple rounds of gel and cortisone injections. Her most recent injection was cortisone in August 2023. She reports the injections are no longer working. She also tried PT and NSAIDs without improvement. She generalizes the pain to the entire knee and notes it is worse wit lau weightbearing activity. Patient is schedule for a right TKR on 5/16/24. She presents today for final discussion.

## 2024-05-03 NOTE — DISCUSSION/SUMMARY
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 The patient has exhausted a minimum of 3 months conservative treatment including prior injections, physical therapy, over the counter NSIADS and pain medication where indicated. In addition, the patient's quality of life is diminished due to significant chronic pain. The patient is having difficulty ambulating, descending stairs, and rising from a seated position.   Based upon the patient's continued symptoms and failure to respond to conservative treatment, I have recommended a right total knee replacement for this patient. A long discussion took place with the patient describing what a total joint replacement is and what the procedure would entail. A knee model, similar to the implants that will be used during the operation, was utilized to demonstrate the implants. Choices of implant manufactures were discussed and reviewed. The ability to secure the implant utilizing cement or cementless (press fit) fixation was discussed. The patient agrees with the plan of care as well as the use of implants.   The hospitalization and post-operative care and rehabilitation were also discussed. The use of perioperative antibiotics and DVT prophylaxis were discussed. The risk, benefits and alternatives to a surgical intervention were discussed at length with the patient. The patient was also advised of risks related to the medical comorbidities and elevated body mass index (BMI). A lengthy discussion took place to review the most common complications including but not limited to: deep vein thrombosis, pulmonary embolus, heart attack, stroke, infection, wound breakdown, numbness, damage to nerves, tendon, muscles, arteries or other blood vessels, death and other possible complications from anesthesia. The patient was told that we will take steps to minimize these risks by using sterile technique, antibiotics and DVT prophylaxis when appropriate and follow the patient postoperatively in the office setting to monitor progress. The possibility of recurrent pain, no improvement in pain and actual worsening of pain were also discussed with the patient.   The discharge plan of care focused on the patient going home following surgery. The patient was encouraged to make the necessary arrangements to have someone stay with them when they are discharged home. Following discharge, a home care nurse will visit the patient. The home care nurse will open your home care case and request home physical therapy services. Home physical therapy will commence following discharge provided it is appropriate and covered by the health insurance benefit plan.   The benefits of surgery were discussed with the patient including the potential for improving the patient's current clinical condition through operative intervention. Alternatives to surgical intervention including continued conservative management were also discussed in detail. All questions were answered to the satisfaction of the patient. The treatment plan of care, as well as a model of a total knee equivalent to the one that will be used for their total joint replacement, was shared with the patient. The patient participated and agreed to the plan of care as well as the use of the recommended implants for their total joint replacement surgery.   We discussed that the knee replacement will be done with robotic assistance to enhance accuracy and dynamic joint balancing.	  Prescriptions for Ecotrin, Pantoprazole, Celebrex, Tylenol, Tramadol, Senna and Keflex sent to pharmacy. Patient will bring the walker day of surgery.  All questions answered.

## 2024-05-08 ENCOUNTER — APPOINTMENT (OUTPATIENT)
Dept: INTERNAL MEDICINE | Facility: CLINIC | Age: 59
End: 2024-05-08
Payer: COMMERCIAL

## 2024-05-08 VITALS
SYSTOLIC BLOOD PRESSURE: 126 MMHG | TEMPERATURE: 98.1 F | HEIGHT: 65 IN | DIASTOLIC BLOOD PRESSURE: 84 MMHG | RESPIRATION RATE: 14 BRPM | OXYGEN SATURATION: 95 % | WEIGHT: 183 LBS | BODY MASS INDEX: 30.49 KG/M2 | HEART RATE: 79 BPM

## 2024-05-08 DIAGNOSIS — Z01.818 ENCOUNTER FOR OTHER PREPROCEDURAL EXAMINATION: ICD-10-CM

## 2024-05-08 PROCEDURE — 99214 OFFICE O/P EST MOD 30 MIN: CPT

## 2024-05-08 NOTE — HISTORY OF PRESENT ILLNESS
[No Pertinent Cardiac History] : no history of aortic stenosis, atrial fibrillation, coronary artery disease, recent myocardial infarction, or implantable device/pacemaker [No Pertinent Pulmonary History] : no history of asthma, COPD, sleep apnea, or smoking [No Adverse Anesthesia Reaction] : no adverse anesthesia reaction in self or family member [(Patient denies any chest pain, claudication, dyspnea on exertion, orthopnea, palpitations or syncope)] : Patient denies any chest pain, claudication, dyspnea on exertion, orthopnea, palpitations or syncope [Chronic Anticoagulation] : no chronic anticoagulation [Chronic Kidney Disease] : no chronic kidney disease [Diabetes] : no diabetes [FreeTextEntry1] : total right knee replacement  [FreeTextEntry2] : 5/16/24 [FreeTextEntry3] : Dr Baptiste [FreeTextEntry4] : Pt presents to the office today for medical clearance for total right knee replacement with Dr Baptiste.

## 2024-05-08 NOTE — PHYSICAL EXAM
[No Acute Distress] : no acute distress [Well Nourished] : well nourished [Well Developed] : well developed [Well-Appearing] : well-appearing [Normal Sclera/Conjunctiva] : normal sclera/conjunctiva [PERRL] : pupils equal round and reactive to light [EOMI] : extraocular movements intact [Normal Outer Ear/Nose] : the outer ears and nose were normal in appearance [Normal Oropharynx] : the oropharynx was normal [No JVD] : no jugular venous distention [No Lymphadenopathy] : no lymphadenopathy [Supple] : supple [Thyroid Normal, No Nodules] : the thyroid was normal and there were no nodules present [No Respiratory Distress] : no respiratory distress  [No Accessory Muscle Use] : no accessory muscle use [Clear to Auscultation] : lungs were clear to auscultation bilaterally [Normal] : no respiratory distress, lungs were clear to auscultation bilaterally and no accessory muscle use [Normal Rate] : normal rate  [Regular Rhythm] : with a regular rhythm [Normal S1, S2] : normal S1 and S2 [No Murmur] : no murmur heard [No Carotid Bruits] : no carotid bruits [No Abdominal Bruit] : a ~M bruit was not heard ~T in the abdomen [No Varicosities] : no varicosities [No Edema] : there was no peripheral edema [No Palpable Aorta] : no palpable aorta [No Extremity Clubbing/Cyanosis] : no extremity clubbing/cyanosis [Soft] : abdomen soft [Non Tender] : non-tender [Non-distended] : non-distended [No Masses] : no abdominal mass palpated [No HSM] : no HSM [Normal Bowel Sounds] : normal bowel sounds [Normal Posterior Cervical Nodes] : no posterior cervical lymphadenopathy [Normal Anterior Cervical Nodes] : no anterior cervical lymphadenopathy [No CVA Tenderness] : no CVA  tenderness [No Spinal Tenderness] : no spinal tenderness [No Joint Swelling] : no joint swelling [Grossly Normal Strength/Tone] : grossly normal strength/tone [No Rash] : no rash [Coordination Grossly Intact] : coordination grossly intact [No Focal Deficits] : no focal deficits [Normal Gait] : normal gait [Normal Affect] : the affect was normal [Normal Insight/Judgement] : insight and judgment were intact

## 2024-05-08 NOTE — CURRENT MEDS
[None] : Patient does not have any barriers to medication adherence [Yes] : Reviewed medication list for presence of high-risk medications. [FreeTextEntry1] : Tramadol given for after surgery.

## 2024-05-16 ENCOUNTER — APPOINTMENT (OUTPATIENT)
Dept: ORTHOPEDIC SURGERY | Facility: AMBULATORY SURGERY CENTER | Age: 59
End: 2024-05-16
Payer: COMMERCIAL

## 2024-05-16 PROCEDURE — 27447 TOTAL KNEE ARTHROPLASTY: CPT | Mod: RT

## 2024-05-16 PROCEDURE — 0055T BONE SRGRY CMPTR CT/MRI IMAG: CPT | Mod: RT

## 2024-05-28 RX ORDER — CYCLOBENZAPRINE HYDROCHLORIDE 5 MG/1
5 TABLET, FILM COATED ORAL
Qty: 14 | Refills: 0 | Status: ACTIVE | COMMUNITY
Start: 2024-05-28 | End: 1900-01-01

## 2024-06-10 ENCOUNTER — NON-APPOINTMENT (OUTPATIENT)
Age: 59
End: 2024-06-10

## 2024-06-10 ENCOUNTER — APPOINTMENT (OUTPATIENT)
Dept: INTERNAL MEDICINE | Facility: CLINIC | Age: 59
End: 2024-06-10
Payer: COMMERCIAL

## 2024-06-10 VITALS
TEMPERATURE: 98.3 F | HEART RATE: 71 BPM | DIASTOLIC BLOOD PRESSURE: 84 MMHG | HEIGHT: 65 IN | OXYGEN SATURATION: 96 % | SYSTOLIC BLOOD PRESSURE: 133 MMHG | RESPIRATION RATE: 14 BRPM | BODY MASS INDEX: 30.82 KG/M2 | WEIGHT: 185 LBS

## 2024-06-10 DIAGNOSIS — Z11.1 ENCOUNTER FOR SCREENING FOR RESPIRATORY TUBERCULOSIS: ICD-10-CM

## 2024-06-10 DIAGNOSIS — E55.9 VITAMIN D DEFICIENCY, UNSPECIFIED: ICD-10-CM

## 2024-06-10 DIAGNOSIS — Z00.00 ENCOUNTER FOR GENERAL ADULT MEDICAL EXAMINATION W/OUT ABNORMAL FINDINGS: ICD-10-CM

## 2024-06-10 DIAGNOSIS — R73.09 OTHER ABNORMAL GLUCOSE: ICD-10-CM

## 2024-06-10 PROCEDURE — 99396 PREV VISIT EST AGE 40-64: CPT

## 2024-06-10 PROCEDURE — 93000 ELECTROCARDIOGRAM COMPLETE: CPT

## 2024-06-10 PROCEDURE — 36415 COLL VENOUS BLD VENIPUNCTURE: CPT

## 2024-06-10 NOTE — HISTORY OF PRESENT ILLNESS
[de-identified] : Pt presents to the office today for CPE and FBW S/P right knee replacement doing very well.  Pt has still been staying active and diet is healthy Mammo and colonoscopy are up to date

## 2024-06-10 NOTE — PLAN
[FreeTextEntry1] : EKG  NSR no changes  Pt will will continue with healthy diet Exercise as tolerated and as per ortho/PT. FBW done in office today Pts forms will be completed once TB testing results are in. Mammo and colonoscopy are up to date See dermatology yearly. See GYN yearly

## 2024-06-10 NOTE — HEALTH RISK ASSESSMENT
[Good] : ~his/her~  mood as  good [No falls in past year] : Patient reported no falls in the past year [Little interest or pleasure doing things] : 1) Little interest or pleasure doing things [Feeling down, depressed, or hopeless] : 2) Feeling down, depressed, or hopeless [0] : 2) Feeling down, depressed, or hopeless: Not at all (0) [PHQ-2 Negative - No further assessment needed] : PHQ-2 Negative - No further assessment needed [Never] : Never [Patient reported mammogram was normal] : Patient reported mammogram was normal [None] : None [With Significant Other] : lives with significant other [] :  [Fully functional (bathing, dressing, toileting, transferring, walking, feeding)] : Fully functional (bathing, dressing, toileting, transferring, walking, feeding) [Fully functional (using the telephone, shopping, preparing meals, housekeeping, doing laundry, using] : Fully functional and needs no help or supervision to perform IADLs (using the telephone, shopping, preparing meals, housekeeping, doing laundry, using transportation, managing medications and managing finances) [[id number]] : 0 [Change in mental status noted] : No change in mental status noted [Language] : denies difficulty with language [MammogramDate] : 12/23

## 2024-06-10 NOTE — PHYSICAL EXAM
[No Acute Distress] : no acute distress [Well Nourished] : well nourished [Well Developed] : well developed [Well-Appearing] : well-appearing [Normal Sclera/Conjunctiva] : normal sclera/conjunctiva [PERRL] : pupils equal round and reactive to light [EOMI] : extraocular movements intact [Normal Outer Ear/Nose] : the outer ears and nose were normal in appearance [Normal Oropharynx] : the oropharynx was normal [No JVD] : no jugular venous distention [No Lymphadenopathy] : no lymphadenopathy [Supple] : supple [Thyroid Normal, No Nodules] : the thyroid was normal and there were no nodules present [No Respiratory Distress] : no respiratory distress  [No Accessory Muscle Use] : no accessory muscle use [Clear to Auscultation] : lungs were clear to auscultation bilaterally [Normal Rate] : normal rate  [Regular Rhythm] : with a regular rhythm [Normal S1, S2] : normal S1 and S2 [No Murmur] : no murmur heard [No Carotid Bruits] : no carotid bruits [No Abdominal Bruit] : a ~M bruit was not heard ~T in the abdomen [No Varicosities] : no varicosities [Pedal Pulses Present] : the pedal pulses are present [No Edema] : there was no peripheral edema [No Palpable Aorta] : no palpable aorta [No Extremity Clubbing/Cyanosis] : no extremity clubbing/cyanosis [Soft] : abdomen soft [Non Tender] : non-tender [Non-distended] : non-distended [No Masses] : no abdominal mass palpated [No HSM] : no HSM [Normal Bowel Sounds] : normal bowel sounds [Normal Posterior Cervical Nodes] : no posterior cervical lymphadenopathy [Normal Anterior Cervical Nodes] : no anterior cervical lymphadenopathy [No CVA Tenderness] : no CVA  tenderness [No Spinal Tenderness] : no spinal tenderness [No Joint Swelling] : no joint swelling [Grossly Normal Strength/Tone] : grossly normal strength/tone [No Rash] : no rash [Coordination Grossly Intact] : coordination grossly intact [No Focal Deficits] : no focal deficits [Normal Gait] : normal gait [Normal Affect] : the affect was normal [Normal Insight/Judgement] : insight and judgment were intact [de-identified] : Surgical incision on right knee healing well

## 2024-06-11 ENCOUNTER — APPOINTMENT (OUTPATIENT)
Dept: ORTHOPEDIC SURGERY | Facility: CLINIC | Age: 59
End: 2024-06-11
Payer: COMMERCIAL

## 2024-06-11 VITALS — HEIGHT: 65 IN | BODY MASS INDEX: 30.82 KG/M2 | WEIGHT: 185 LBS

## 2024-06-11 PROCEDURE — 99024 POSTOP FOLLOW-UP VISIT: CPT

## 2024-06-11 PROCEDURE — 73562 X-RAY EXAM OF KNEE 3: CPT | Mod: RT

## 2024-06-11 NOTE — HISTORY OF PRESENT ILLNESS
[de-identified] : S/P Right robotic assisted TKR with BRYAN, DOS: 5/16/24. [de-identified] :  VIRI DIXON is doing well 3 week s/p right total knee replacement. She is participating in outpatient PT, as well as a home exercise program daily. Her pain level is controlled. She is taking aspirin 81 mg twice a day for DVT prophylaxis. Overall, she is very happy with the results of the surgery so far. [de-identified] :  Exam of the right knee shows a well healing incision, 0 to 108 degrees of flexion measured with a goniometer. 5/5 motor strength bilaterally distally. Sensation intact distally. [de-identified] :  X-ray: 3 views of the right knee demonstrate a total knee replacement in good alignment with a well centered patella, without evidence of loosening or subsidence, and no fracture. [de-identified] :  The patient is doing very well 3 weeks after right TKR. The patient will continue outpatient physical therapy which she stated and has done 3 visits. The patient will continue aspirin 81 mg twice per day for DVT prophylaxis for the next 3 weeks. The importance of physical therapy and achieving full knee extension as well as progressing knee flexion was reinforced. Overall the patient is very happy with their outcome so far. Followup in 3 weeks with repeat x-rays.

## 2024-06-11 NOTE — ADDENDUM
[FreeTextEntry1] :  This note was authored by PAUL CHISHOLM working as a medical scribe for Dr. Remi Baptiste. The note was reviewed, edited, and revised by Dr. Remi Baptiste whom is in agreement with the exam findings, imaging findings, and treatment plan. 06/11/2024

## 2024-06-21 ENCOUNTER — TRANSCRIPTION ENCOUNTER (OUTPATIENT)
Age: 59
End: 2024-06-21

## 2024-06-21 LAB
25(OH)D3 SERPL-MCNC: 40.8 NG/ML
ALBUMIN SERPL ELPH-MCNC: 4.7 G/DL
ALP BLD-CCNC: 115 U/L
ALT SERPL-CCNC: 21 U/L
ANION GAP SERPL CALC-SCNC: 17 MMOL/L
APPEARANCE: CLEAR
AST SERPL-CCNC: 22 U/L
BACTERIA: NEGATIVE /HPF
BASOPHILS # BLD AUTO: 0.05 K/UL
BASOPHILS NFR BLD AUTO: 0.6 %
BILIRUB SERPL-MCNC: 0.5 MG/DL
BILIRUBIN URINE: NEGATIVE
BLOOD URINE: NEGATIVE
BUN SERPL-MCNC: 15 MG/DL
CALCIUM SERPL-MCNC: 9.8 MG/DL
CAST: 0 /LPF
CHLORIDE SERPL-SCNC: 103 MMOL/L
CHOLEST SERPL-MCNC: 198 MG/DL
CO2 SERPL-SCNC: 22 MMOL/L
COLOR: YELLOW
CREAT SERPL-MCNC: 0.67 MG/DL
EGFR: 101 ML/MIN/1.73M2
EOSINOPHIL # BLD AUTO: 0.43 K/UL
EOSINOPHIL NFR BLD AUTO: 5.5 %
EPITHELIAL CELLS: 0 /HPF
ESTIMATED AVERAGE GLUCOSE: 111 MG/DL
GLUCOSE QUALITATIVE U: NEGATIVE MG/DL
GLUCOSE SERPL-MCNC: 101 MG/DL
HBA1C MFR BLD HPLC: 5.5 %
HCT VFR BLD CALC: 40 %
HDLC SERPL-MCNC: 65 MG/DL
HGB BLD-MCNC: 12.7 G/DL
IMM GRANULOCYTES NFR BLD AUTO: 0.3 %
KETONES URINE: NEGATIVE MG/DL
LDLC SERPL CALC-MCNC: 110 MG/DL
LEUKOCYTE ESTERASE URINE: NEGATIVE
LYMPHOCYTES # BLD AUTO: 1.96 K/UL
LYMPHOCYTES NFR BLD AUTO: 24.9 %
M TB IFN-G BLD-IMP: NEGATIVE
MAN DIFF?: NORMAL
MCHC RBC-ENTMCNC: 28.7 PG
MCHC RBC-ENTMCNC: 31.8 GM/DL
MCV RBC AUTO: 90.5 FL
MICROSCOPIC-UA: NORMAL
MONOCYTES # BLD AUTO: 0.71 K/UL
MONOCYTES NFR BLD AUTO: 9 %
NEUTROPHILS # BLD AUTO: 4.69 K/UL
NEUTROPHILS NFR BLD AUTO: 59.7 %
NITRITE URINE: NEGATIVE
NONHDLC SERPL-MCNC: 133 MG/DL
PH URINE: 6.5
PLATELET # BLD AUTO: 349 K/UL
POTASSIUM SERPL-SCNC: 4.8 MMOL/L
PROT SERPL-MCNC: 7.1 G/DL
PROTEIN URINE: NEGATIVE MG/DL
QUANTIFERON TB PLUS MITOGEN MINUS NIL: 2.97 IU/ML
QUANTIFERON TB PLUS NIL: 0.02 IU/ML
QUANTIFERON TB PLUS TB1 MINUS NIL: 0 IU/ML
QUANTIFERON TB PLUS TB2 MINUS NIL: 0.01 IU/ML
RBC # BLD: 4.42 M/UL
RBC # FLD: 12.8 %
RED BLOOD CELLS URINE: 1 /HPF
SODIUM SERPL-SCNC: 141 MMOL/L
SPECIFIC GRAVITY URINE: 1
TRIGL SERPL-MCNC: 127 MG/DL
TSH SERPL-ACNC: 2.7 UIU/ML
UROBILINOGEN URINE: 0.2 MG/DL
VIT B12 SERPL-MCNC: 618 PG/ML
WBC # FLD AUTO: 7.86 K/UL
WHITE BLOOD CELLS URINE: 0 /HPF

## 2024-07-02 ENCOUNTER — APPOINTMENT (OUTPATIENT)
Dept: ORTHOPEDIC SURGERY | Facility: CLINIC | Age: 59
End: 2024-07-02
Payer: COMMERCIAL

## 2024-07-02 VITALS — HEIGHT: 65 IN | WEIGHT: 185 LBS | BODY MASS INDEX: 30.82 KG/M2

## 2024-07-02 PROBLEM — Z78.9 OTHER SPECIFIED HEALTH STATUS: Chronic | Status: ACTIVE | Noted: 2024-04-25

## 2024-07-02 PROCEDURE — 99024 POSTOP FOLLOW-UP VISIT: CPT

## 2024-07-02 PROCEDURE — 73562 X-RAY EXAM OF KNEE 3: CPT | Mod: RT

## 2024-08-08 ENCOUNTER — LABORATORY RESULT (OUTPATIENT)
Age: 59
End: 2024-08-08

## 2024-08-08 ENCOUNTER — APPOINTMENT (OUTPATIENT)
Dept: ORTHOPEDIC SURGERY | Facility: CLINIC | Age: 59
End: 2024-08-08

## 2024-08-08 PROCEDURE — 73562 X-RAY EXAM OF KNEE 3: CPT | Mod: RT

## 2024-08-08 PROCEDURE — 20610 DRAIN/INJ JOINT/BURSA W/O US: CPT | Mod: 79,RT

## 2024-08-08 PROCEDURE — 99213 OFFICE O/P EST LOW 20 MIN: CPT | Mod: 24,25

## 2024-08-08 NOTE — PROCEDURE
[de-identified] : Using sterile technique the right knee was aspirated from the superior lateral approach revealing 20 cc of serosanguineous fluid.  A Band-Aid dressing was applied.  No gross purulence.

## 2024-08-08 NOTE — HISTORY OF PRESENT ILLNESS
[de-identified] : S/P Right robotic assisted TKR with BRYAN, DOS: 5/16/24. [de-identified] :  VIRI DIXON is a 58 year female 3 months s/p right TKR She reports continuing outpatient physical therapy. She reports having posterior knee pain and swelling. She has returned to daily activities of life but reports pain radiating from the buttock down the entire leg. [de-identified] :  Exam of the right knee shows a well healed incision, 0 to 105 degrees of flexion measured with a goniometer. There is a moderate effusion. 5/5 motor strength bilaterally distally. Sensation intact distally. [de-identified] :  X-ray: 3 views of the right knee demonstrate a total knee replacement in good alignment with a well centered patella, without evidence of loosening or subsidence, and no fracture. [de-identified] :  The patient is 3 months following right total knee replacement. The patient will continue outpatient physical therapy. The right knee was aspirated using a 20 gauge needle from a superolateral approach using sterile technique. 20 CC's of synovial fluid was obtained and did not appear infectious.  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  Dental prophylaxis was reviewed.  A course of Celebrex was recommended. The patient was given a prescription for the Celebrex with directions. She was instructed to stop the medicine and call the office if there are any adverse reaction to the medicine.  We will contact her with the results of the aspiration once we have cell counts and cultures

## 2024-08-09 ENCOUNTER — INPATIENT (INPATIENT)
Facility: HOSPITAL | Age: 59
LOS: 11 days | Discharge: HOME HEALTH | DRG: 550 | End: 2024-08-21
Attending: ORTHOPAEDIC SURGERY | Admitting: ORTHOPAEDIC SURGERY
Payer: COMMERCIAL

## 2024-08-09 VITALS
OXYGEN SATURATION: 95 % | SYSTOLIC BLOOD PRESSURE: 134 MMHG | WEIGHT: 187.39 LBS | DIASTOLIC BLOOD PRESSURE: 73 MMHG | HEART RATE: 102 BPM | RESPIRATION RATE: 20 BRPM | TEMPERATURE: 98 F | HEIGHT: 65 IN

## 2024-08-09 DIAGNOSIS — Z90.49 ACQUIRED ABSENCE OF OTHER SPECIFIED PARTS OF DIGESTIVE TRACT: Chronic | ICD-10-CM

## 2024-08-09 DIAGNOSIS — M00.9 PYOGENIC ARTHRITIS, UNSPECIFIED: ICD-10-CM

## 2024-08-09 DIAGNOSIS — Z98.890 OTHER SPECIFIED POSTPROCEDURAL STATES: Chronic | ICD-10-CM

## 2024-08-09 LAB
ALBUMIN SERPL ELPH-MCNC: 3.8 G/DL — SIGNIFICANT CHANGE UP (ref 3.3–5.2)
ALP SERPL-CCNC: 94 U/L — SIGNIFICANT CHANGE UP (ref 40–120)
ALT FLD-CCNC: 23 U/L — SIGNIFICANT CHANGE UP
ANION GAP SERPL CALC-SCNC: 16 MMOL/L — SIGNIFICANT CHANGE UP (ref 5–17)
APTT BLD: 27.1 SEC — SIGNIFICANT CHANGE UP (ref 24.5–35.6)
AST SERPL-CCNC: 25 U/L — SIGNIFICANT CHANGE UP
BASOPHILS # BLD AUTO: 0.03 K/UL — SIGNIFICANT CHANGE UP (ref 0–0.2)
BASOPHILS NFR BLD AUTO: 0.3 % — SIGNIFICANT CHANGE UP (ref 0–2)
BILIRUB SERPL-MCNC: 0.2 MG/DL — LOW (ref 0.4–2)
BLD GP AB SCN SERPL QL: SIGNIFICANT CHANGE UP
BUN SERPL-MCNC: 16.4 MG/DL — SIGNIFICANT CHANGE UP (ref 8–20)
CALCIUM SERPL-MCNC: 9.3 MG/DL — SIGNIFICANT CHANGE UP (ref 8.4–10.5)
CHLORIDE SERPL-SCNC: 99 MMOL/L — SIGNIFICANT CHANGE UP (ref 96–108)
CO2 SERPL-SCNC: 23 MMOL/L — SIGNIFICANT CHANGE UP (ref 22–29)
CREAT SERPL-MCNC: 0.51 MG/DL — SIGNIFICANT CHANGE UP (ref 0.5–1.3)
EGFR: 108 ML/MIN/1.73M2 — SIGNIFICANT CHANGE UP
EOSINOPHIL # BLD AUTO: 0.3 K/UL — SIGNIFICANT CHANGE UP (ref 0–0.5)
EOSINOPHIL NFR BLD AUTO: 3.3 % — SIGNIFICANT CHANGE UP (ref 0–6)
GLUCOSE SERPL-MCNC: 118 MG/DL — HIGH (ref 70–99)
HCT VFR BLD CALC: 34.8 % — SIGNIFICANT CHANGE UP (ref 34.5–45)
HGB BLD-MCNC: 11.3 G/DL — LOW (ref 11.5–15.5)
IMM GRANULOCYTES NFR BLD AUTO: 0.3 % — SIGNIFICANT CHANGE UP (ref 0–0.9)
INR BLD: 1.1 RATIO — SIGNIFICANT CHANGE UP (ref 0.85–1.18)
LYMPHOCYTES # BLD AUTO: 2.05 K/UL — SIGNIFICANT CHANGE UP (ref 1–3.3)
LYMPHOCYTES # BLD AUTO: 22.6 % — SIGNIFICANT CHANGE UP (ref 13–44)
MCHC RBC-ENTMCNC: 27.6 PG — SIGNIFICANT CHANGE UP (ref 27–34)
MCHC RBC-ENTMCNC: 32.5 GM/DL — SIGNIFICANT CHANGE UP (ref 32–36)
MCV RBC AUTO: 84.9 FL — SIGNIFICANT CHANGE UP (ref 80–100)
MONOCYTES # BLD AUTO: 1.06 K/UL — HIGH (ref 0–0.9)
MONOCYTES NFR BLD AUTO: 11.7 % — SIGNIFICANT CHANGE UP (ref 2–14)
NEUTROPHILS # BLD AUTO: 5.59 K/UL — SIGNIFICANT CHANGE UP (ref 1.8–7.4)
NEUTROPHILS NFR BLD AUTO: 61.8 % — SIGNIFICANT CHANGE UP (ref 43–77)
PLATELET # BLD AUTO: 368 K/UL — SIGNIFICANT CHANGE UP (ref 150–400)
POTASSIUM SERPL-MCNC: 4.1 MMOL/L — SIGNIFICANT CHANGE UP (ref 3.5–5.3)
POTASSIUM SERPL-SCNC: 4.1 MMOL/L — SIGNIFICANT CHANGE UP (ref 3.5–5.3)
PROT SERPL-MCNC: 7.1 G/DL — SIGNIFICANT CHANGE UP (ref 6.6–8.7)
PROTHROM AB SERPL-ACNC: 12.2 SEC — SIGNIFICANT CHANGE UP (ref 9.5–13)
RBC # BLD: 4.1 M/UL — SIGNIFICANT CHANGE UP (ref 3.8–5.2)
RBC # FLD: 12.3 % — SIGNIFICANT CHANGE UP (ref 10.3–14.5)
SODIUM SERPL-SCNC: 138 MMOL/L — SIGNIFICANT CHANGE UP (ref 135–145)
WBC # BLD: 9.06 K/UL — SIGNIFICANT CHANGE UP (ref 3.8–10.5)
WBC # FLD AUTO: 9.06 K/UL — SIGNIFICANT CHANGE UP (ref 3.8–10.5)

## 2024-08-09 PROCEDURE — 93010 ELECTROCARDIOGRAM REPORT: CPT

## 2024-08-09 PROCEDURE — 99285 EMERGENCY DEPT VISIT HI MDM: CPT

## 2024-08-09 RX ORDER — POVIDONE-IODINE 10 %
1 SOLUTION, NON-ORAL TOPICAL ONCE
Refills: 0 | Status: DISCONTINUED | OUTPATIENT
Start: 2024-08-09 | End: 2024-08-10

## 2024-08-09 RX ORDER — CEFAZOLIN SODIUM 2 G/100ML
2000 INJECTION, SOLUTION INTRAVENOUS ONCE
Refills: 0 | Status: DISCONTINUED | OUTPATIENT
Start: 2024-08-10 | End: 2024-08-10

## 2024-08-09 RX ORDER — SODIUM CHLORIDE 9 MG/ML
1000 INJECTION INTRAMUSCULAR; INTRAVENOUS; SUBCUTANEOUS
Refills: 0 | Status: DISCONTINUED | OUTPATIENT
Start: 2024-08-09 | End: 2024-08-10

## 2024-08-09 RX ORDER — VANCOMYCIN/0.9 % SOD CHLORIDE 1.75G/25
1250 PLASTIC BAG, INJECTION (ML) INTRAVENOUS ONCE
Refills: 0 | Status: DISCONTINUED | OUTPATIENT
Start: 2024-08-10 | End: 2024-08-10

## 2024-08-09 RX ORDER — MUPIROCIN 2 %
1 OINTMENT (GRAM) TOPICAL
Refills: 0 | Status: DISCONTINUED | OUTPATIENT
Start: 2024-08-09 | End: 2024-08-10

## 2024-08-09 RX ORDER — CHLORHEXIDINE GLUCONATE 40 MG/ML
1 SOLUTION TOPICAL EVERY 12 HOURS
Refills: 0 | Status: DISCONTINUED | OUTPATIENT
Start: 2024-08-09 | End: 2024-08-10

## 2024-08-09 RX ADMIN — Medication 3 MILLIGRAM(S): at 23:09

## 2024-08-09 NOTE — ED PROVIDER NOTE - OBJECTIVE STATEMENT
59 yo female sent in by ortho Dr. Baptiste for pre-op testing to go to OR for septic joint washout tomorrow. Pt sp joint replacement 11 weeks ago. 59 yo female sent in by ortho Dr. Baptiste for pre-op testing to go to OR for septic joint washout tomorrow. Pt sp joint replacement 11 weeks ago. Care was assumed by the orthopedics service at ED arrival. 57 yo female sent in by ortho Dr. Baptiste for admission. Orthopedics service assumed care in the emergency room with plans for OR tomorrow. Pt had R knee replacement 11 weeks ago, confirmed hardware infection on outpatient labs yesterday.

## 2024-08-09 NOTE — ED ADULT NURSE NOTE - OBJECTIVE STATEMENT
patient presents to the ER for right knee swelling post knee replacement 11 weeks ago. patient reports that aspirate my done yesterday by MD which showed infection. patient denies fever, chills, nausea, vomiting, numbness or tingling.

## 2024-08-09 NOTE — ED ADULT TRIAGE NOTE - HEIGHT IN FEET
"Chief Complaint   Patient presents with     A.D.H.D       Initial /62 (BP Location: Right arm, Patient Position: Chair, Cuff Size: Adult Small)  Pulse 86  Temp 98  F (36.7  C) (Oral)  Ht 4' 6\" (1.372 m)  Wt 63 lb (28.6 kg)  SpO2 99%  BMI 15.19 kg/m2 Estimated body mass index is 15.19 kg/(m^2) as calculated from the following:    Height as of this encounter: 4' 6\" (1.372 m).    Weight as of this encounter: 63 lb (28.6 kg).  Medication Reconciliation: complete   Any Restrepo MA      " 5

## 2024-08-09 NOTE — ED ADULT TRIAGE NOTE - CHIEF COMPLAINT QUOTE
sent from MD Baptiste for pre op testing. Pt has infection to R knee s/p R knee replacement x 11 weeks ago. Denies any fevers/chills.

## 2024-08-09 NOTE — H&P ADULT - HISTORY OF PRESENT ILLNESS
Patient had Right total knee arthroplasty May 23, 2024 went to see Dr Baptiste in office due to new onset of right posterior leg pain and swelling in her knee.

## 2024-08-09 NOTE — ED PROVIDER NOTE - CLINICAL SUMMARY MEDICAL DECISION MAKING FREE TEXT BOX
Leonila Witt MD, Attending  ddx includes, but is not limited to the following: septic joint with infected hardware, lower suspicion for fracture or dislocation.   plan: preop labs, EKG, CXR, admission.   update: see progress notes.   ---- Leonila Witt MD, Attending  Patient sent in for admission to ortho. Ortho service assumed care of patient in the ED

## 2024-08-09 NOTE — H&P ADULT - NS ATTEND AMEND GEN_ALL_CORE FT
Patient advised to come to ER for admission for prosthetic joint injection right TKR 3 months s/p TKR.  Seen in office 8/8 with large knee effusion and pain - underwent aspiration which yesterday revealed 30K WBC.  Discussed the need for surgical treatment and all options discussed including DAIR, double DAIR with antibiotic beads, single and double stage exchange.  After all risks and benefits discussed, have decided that provided the implants are well-fixed, we will proceed with double DAIR with antibiotic beads to maximize chance of infection eradication and minimize morbidity.  Will require repeat DAIR in approximately 1 week with removal of the beads.  If intra-operatively the implants are noted to be loose, would proceed with single stage exchange.  Discussed that if this fails to cure the infection the next procedure would be 2-stage exchange.  Patient understands and agrees with plan.

## 2024-08-09 NOTE — H&P ADULT - NSHPPHYSICALEXAM_GEN_ALL_CORE
Right knee has mild - moderate swelling noted. No significant pain yet discomfort. Well healed surgical scar noted. No erythema noted. calf soft, supple and nontender ROM full 0-90

## 2024-08-09 NOTE — H&P ADULT - ASSESSMENT
Patient underwent right knee aspiration by Dr Baptiste yesterday which revealed increase in cell count of synovial fluid.   Dr Baptiste discussed results with patient and she is in agreement for surgical intervention tomorrow 8/10/24 for I&D with placement of antibiotic cement beads and poly swap

## 2024-08-09 NOTE — ED ADULT TRIAGE NOTE - BSA (M2)
----- Message from Roly Flannery MD sent at 11/20/2023  3:32 PM CST -----  Please call patient with results.  No anemia.  Normal thyroid function.  Normal glucose, renal function, liver function.  
Call made to pt no answer left voice  mail to call office   
1.92

## 2024-08-10 ENCOUNTER — TRANSCRIPTION ENCOUNTER (OUTPATIENT)
Age: 59
End: 2024-08-10

## 2024-08-10 LAB
MRSA PCR RESULT.: SIGNIFICANT CHANGE UP
S AUREUS DNA NOSE QL NAA+PROBE: SIGNIFICANT CHANGE UP

## 2024-08-10 PROCEDURE — 27486 REVISE/REPLACE KNEE JOINT: CPT | Mod: 78,RT

## 2024-08-10 PROCEDURE — 11981 INSERTION DRUG DLVR IMPLANT: CPT | Mod: 78

## 2024-08-10 PROCEDURE — 73560 X-RAY EXAM OF KNEE 1 OR 2: CPT | Mod: 26,RT

## 2024-08-10 PROCEDURE — 11981 INSERTION DRUG DLVR IMPLANT: CPT | Mod: AS,78

## 2024-08-10 PROCEDURE — 27486 REVISE/REPLACE KNEE JOINT: CPT | Mod: AS,78,RT

## 2024-08-10 PROCEDURE — 99223 1ST HOSP IP/OBS HIGH 75: CPT

## 2024-08-10 PROCEDURE — 73560 X-RAY EXAM OF KNEE 1 OR 2: CPT | Mod: 26,RT,77

## 2024-08-10 PROCEDURE — 99222 1ST HOSP IP/OBS MODERATE 55: CPT

## 2024-08-10 DEVICE — INSERT TIB BEARING CS X3 SZ 5 9MM: Type: IMPLANTABLE DEVICE | Status: FUNCTIONAL

## 2024-08-10 DEVICE — CEMENT PALACOS R: Type: IMPLANTABLE DEVICE | Status: FUNCTIONAL

## 2024-08-10 DEVICE — ZIMMER/NEXGEN SMOOTH PIN 3.2X75MM: Type: IMPLANTABLE DEVICE | Status: FUNCTIONAL

## 2024-08-10 RX ORDER — ACETAMINOPHEN 325 MG/1
975 TABLET ORAL EVERY 8 HOURS
Refills: 0 | Status: DISCONTINUED | OUTPATIENT
Start: 2024-08-10 | End: 2024-08-19

## 2024-08-10 RX ORDER — ONDANSETRON 2 MG/ML
4 INJECTION, SOLUTION INTRAMUSCULAR; INTRAVENOUS ONCE
Refills: 0 | Status: DISCONTINUED | OUTPATIENT
Start: 2024-08-10 | End: 2024-08-10

## 2024-08-10 RX ORDER — ACETAMINOPHEN 325 MG/1
1000 TABLET ORAL ONCE
Refills: 0 | Status: COMPLETED | OUTPATIENT
Start: 2024-08-10 | End: 2024-08-10

## 2024-08-10 RX ORDER — MAGNESIUM, ALUMINUM HYDROXIDE 200-225/5
30 SUSPENSION, ORAL (FINAL DOSE FORM) ORAL
Refills: 0 | Status: DISCONTINUED | OUTPATIENT
Start: 2024-08-10 | End: 2024-08-19

## 2024-08-10 RX ORDER — HYDROMORPHONE HYDROCHLORIDE 2 MG/1
0.5 TABLET ORAL
Refills: 0 | Status: DISCONTINUED | OUTPATIENT
Start: 2024-08-10 | End: 2024-08-10

## 2024-08-10 RX ORDER — OXYCODONE HYDROCHLORIDE 5 MG/1
10 TABLET ORAL
Refills: 0 | Status: DISCONTINUED | OUTPATIENT
Start: 2024-08-10 | End: 2024-08-11

## 2024-08-10 RX ORDER — CELECOXIB 400 MG/1
200 CAPSULE ORAL EVERY 12 HOURS
Refills: 0 | Status: DISCONTINUED | OUTPATIENT
Start: 2024-08-10 | End: 2024-08-19

## 2024-08-10 RX ORDER — SODIUM CHLORIDE 9 MG/ML
1000 INJECTION INTRAMUSCULAR; INTRAVENOUS; SUBCUTANEOUS
Refills: 0 | Status: DISCONTINUED | OUTPATIENT
Start: 2024-08-10 | End: 2024-08-19

## 2024-08-10 RX ORDER — CEFAZOLIN SODIUM 2 G/100ML
2000 INJECTION, SOLUTION INTRAVENOUS EVERY 8 HOURS
Refills: 0 | Status: DISCONTINUED | OUTPATIENT
Start: 2024-08-10 | End: 2024-08-13

## 2024-08-10 RX ORDER — ONDANSETRON 2 MG/ML
4 INJECTION, SOLUTION INTRAMUSCULAR; INTRAVENOUS EVERY 6 HOURS
Refills: 0 | Status: DISCONTINUED | OUTPATIENT
Start: 2024-08-10 | End: 2024-08-19

## 2024-08-10 RX ORDER — KETOROLAC TROMETHAMINE 30 MG/ML
15 INJECTION, SOLUTION INTRAMUSCULAR EVERY 6 HOURS
Refills: 0 | Status: DISCONTINUED | OUTPATIENT
Start: 2024-08-10 | End: 2024-08-11

## 2024-08-10 RX ORDER — PANTOPRAZOLE SODIUM 40 MG
40 TABLET, DELAYED RELEASE (ENTERIC COATED) ORAL
Refills: 0 | Status: DISCONTINUED | OUTPATIENT
Start: 2024-08-10 | End: 2024-08-19

## 2024-08-10 RX ORDER — FENTANYL CITRATE 50 UG/ML
25 INJECTION INTRAMUSCULAR; INTRAVENOUS
Refills: 0 | Status: DISCONTINUED | OUTPATIENT
Start: 2024-08-10 | End: 2024-08-10

## 2024-08-10 RX ORDER — KETOROLAC TROMETHAMINE 30 MG/ML
30 INJECTION, SOLUTION INTRAMUSCULAR ONCE
Refills: 0 | Status: DISCONTINUED | OUTPATIENT
Start: 2024-08-10 | End: 2024-08-10

## 2024-08-10 RX ORDER — HYDROMORPHONE HYDROCHLORIDE 2 MG/1
0.5 TABLET ORAL
Refills: 0 | Status: DISCONTINUED | OUTPATIENT
Start: 2024-08-10 | End: 2024-08-17

## 2024-08-10 RX ORDER — SENNA 187 MG
2 TABLET ORAL AT BEDTIME
Refills: 0 | Status: DISCONTINUED | OUTPATIENT
Start: 2024-08-10 | End: 2024-08-19

## 2024-08-10 RX ORDER — OXYCODONE HYDROCHLORIDE 5 MG/1
5 TABLET ORAL
Refills: 0 | Status: DISCONTINUED | OUTPATIENT
Start: 2024-08-10 | End: 2024-08-12

## 2024-08-10 RX ORDER — HYDROMORPHONE HYDROCHLORIDE 2 MG/1
4 TABLET ORAL
Refills: 0 | Status: DISCONTINUED | OUTPATIENT
Start: 2024-08-10 | End: 2024-08-11

## 2024-08-10 RX ORDER — VANCOMYCIN/0.9 % SOD CHLORIDE 1.75G/25
1250 PLASTIC BAG, INJECTION (ML) INTRAVENOUS EVERY 12 HOURS
Refills: 0 | Status: DISCONTINUED | OUTPATIENT
Start: 2024-08-10 | End: 2024-08-13

## 2024-08-10 RX ORDER — ENOXAPARIN SODIUM 100 MG/ML
30 INJECTION SUBCUTANEOUS EVERY 12 HOURS
Refills: 0 | Status: DISCONTINUED | OUTPATIENT
Start: 2024-08-11 | End: 2024-08-18

## 2024-08-10 RX ORDER — ACETAMINOPHEN 325 MG/1
1000 TABLET ORAL ONCE
Refills: 0 | Status: COMPLETED | OUTPATIENT
Start: 2024-08-10 | End: 2024-08-11

## 2024-08-10 RX ORDER — SODIUM CHLORIDE 9 MG/ML
500 INJECTION INTRAMUSCULAR; INTRAVENOUS; SUBCUTANEOUS ONCE
Refills: 0 | Status: COMPLETED | OUTPATIENT
Start: 2024-08-10 | End: 2024-08-10

## 2024-08-10 RX ADMIN — Medication 2 TABLET(S): at 22:26

## 2024-08-10 RX ADMIN — ACETAMINOPHEN 1000 MILLIGRAM(S): 325 TABLET ORAL at 14:05

## 2024-08-10 RX ADMIN — CELECOXIB 200 MILLIGRAM(S): 400 CAPSULE ORAL at 17:49

## 2024-08-10 RX ADMIN — HYDROMORPHONE HYDROCHLORIDE 0.5 MILLIGRAM(S): 2 TABLET ORAL at 14:40

## 2024-08-10 RX ADMIN — ACETAMINOPHEN 400 MILLIGRAM(S): 325 TABLET ORAL at 13:32

## 2024-08-10 RX ADMIN — HYDROMORPHONE HYDROCHLORIDE 0.5 MILLIGRAM(S): 2 TABLET ORAL at 14:05

## 2024-08-10 RX ADMIN — Medication 40 MILLIGRAM(S): at 17:49

## 2024-08-10 RX ADMIN — KETOROLAC TROMETHAMINE 15 MILLIGRAM(S): 30 INJECTION, SOLUTION INTRAMUSCULAR at 17:28

## 2024-08-10 RX ADMIN — HYDROMORPHONE HYDROCHLORIDE 4 MILLIGRAM(S): 2 TABLET ORAL at 22:27

## 2024-08-10 RX ADMIN — ACETAMINOPHEN 975 MILLIGRAM(S): 325 TABLET ORAL at 23:25

## 2024-08-10 RX ADMIN — HYDROMORPHONE HYDROCHLORIDE 4 MILLIGRAM(S): 2 TABLET ORAL at 23:25

## 2024-08-10 RX ADMIN — CHLORHEXIDINE GLUCONATE 1 APPLICATION(S): 40 SOLUTION TOPICAL at 05:14

## 2024-08-10 RX ADMIN — HYDROMORPHONE HYDROCHLORIDE 0.5 MILLIGRAM(S): 2 TABLET ORAL at 14:25

## 2024-08-10 RX ADMIN — KETOROLAC TROMETHAMINE 30 MILLIGRAM(S): 30 INJECTION, SOLUTION INTRAMUSCULAR at 14:07

## 2024-08-10 RX ADMIN — ACETAMINOPHEN 975 MILLIGRAM(S): 325 TABLET ORAL at 22:26

## 2024-08-10 RX ADMIN — SODIUM CHLORIDE 125 MILLILITER(S): 9 INJECTION INTRAMUSCULAR; INTRAVENOUS; SUBCUTANEOUS at 17:50

## 2024-08-10 RX ADMIN — KETOROLAC TROMETHAMINE 30 MILLIGRAM(S): 30 INJECTION, SOLUTION INTRAMUSCULAR at 14:20

## 2024-08-10 RX ADMIN — Medication 166.67 MILLIGRAM(S): at 22:27

## 2024-08-10 RX ADMIN — CELECOXIB 200 MILLIGRAM(S): 400 CAPSULE ORAL at 18:20

## 2024-08-10 RX ADMIN — HYDROMORPHONE HYDROCHLORIDE 4 MILLIGRAM(S): 2 TABLET ORAL at 18:57

## 2024-08-10 RX ADMIN — SODIUM CHLORIDE 500 MILLILITER(S): 9 INJECTION INTRAMUSCULAR; INTRAVENOUS; SUBCUTANEOUS at 14:25

## 2024-08-10 RX ADMIN — KETOROLAC TROMETHAMINE 15 MILLIGRAM(S): 30 INJECTION, SOLUTION INTRAMUSCULAR at 18:00

## 2024-08-10 RX ADMIN — Medication 1 APPLICATION(S): at 05:13

## 2024-08-10 RX ADMIN — CEFAZOLIN SODIUM 2000 MILLIGRAM(S): 2 INJECTION, SOLUTION INTRAVENOUS at 19:40

## 2024-08-10 RX ADMIN — HYDROMORPHONE HYDROCHLORIDE 0.5 MILLIGRAM(S): 2 TABLET ORAL at 13:50

## 2024-08-10 NOTE — CONSULT NOTE ADULT - SUBJECTIVE AND OBJECTIVE BOX
[facility]  INFECTIOUS DISEASES at [facility] [place]  =====================================================       Faraz Calhoun MD                                                        Diplomates American Board of Internal Medicine & Infectious Diseases                * [place] Office - Appt - Tel  [phone] Fax [phone]                * [place] Office - Appt - Tel [phone] Fax [phone]                                  Hospital Consult line:  [phone]  =====================================================      [id number]  VIRI DESTINY        CC: Patient is a 58y old  Female who presents with a chief complaint of Right knee septic joint (10 Aug 2024 14:00)      HPI: 58F s/p Right total knee arthroplasty on 5/23/2024 admitted for right knee PJI. Seen in the office by Dr. Baptiste on 8/8 >> patient c/o posterior knee swelling and pain. Underwent arthrocentesis (no gross purulence). Synovial fluid cell count with RBC 84,000, nuc cell 30,798 (N 72%)    ID consulted for antibiotics management.     Patient seen in PACU. Somnolent post-op.     ______________________________________________________  PAST MEDICAL & SURGICAL HISTORY:    H/O arthroscopy of knee    S/P wrist surgery    S/P cholecystectomy    S/P shoulder surgery          Social History:  Unable to obtain due to medical condition - somnolent in PACU     FAMILY HISTORY:  FH: heart disease (Grandparent)    FHx: diabetes mellitus (Grandparent)        ______________________________________________________  Allergies    penicillin (Hives; Pruritus)    Intolerances        ______________________________________________________  REVIEW OF SYSTEMS:  ROS limited - seen in PACU, somnolent after anesthesia.   C/o severe pain     _____________________________________________________  PHYSICAL EXAM:  GEN: AAOx4. Lying comfortable in bed, in no acute distress   HEENT: normocephalic and atraumatic. EOMI. PERRL.  Anicteric sclerae. Moist mucous membranes. No mucosal lesions. No nasal discharge.   NECK: Supple. No palpable neck masses or LN  LUNGS: eupneic, CTA B/L, no adventitious sounds  HEART: RRR, no m/r/g  ABDOMEN: Soft, NT, ND, no hepatosplenomegally, no palpable masses.  +BS.    : No CVA tenderness, no Wilkinson catheter  EXTREMITIES: well perfused, without  edema.  MSK: No joint deformity or swelling  LYMPH: no palpable cervical, supraclavicular, axillary or inguinal lymph nodes  NEUROLOGIC: Grossly no motor focal deficits   PSYCHIATRIC: Appropriate affect and mood.  SKIN: No rash, wounds or jaundice  LINES: PIV, no phlebitis     ______________________________________________________  Height (cm): 165.1 (08-10 @ 01:22)  Weight (kg): 85 (08-10 @ 01:22)  BMI (kg/m2): 31.2 (08-10 @ 01:22)  BSA (m2): 1.92 (08-10 @ 01:22)    Vitals:  T(F): 97 (10 Aug 2024 15:00), Max: 98.3 (09 Aug 2024 23:45)  HR: 78 (10 Aug 2024 15:00)  BP: 123/82 (10 Aug 2024 15:00)  RR: 13 (10 Aug 2024 15:00)  SpO2: 100% (10 Aug 2024 15:00) (95% - 100%)  temp max in last 48H T(F): , Max: 98.3 (08-09-24 @ 23:45)    Current Antibiotics:  ceFAZolin  Injectable. 2000 milliGRAM(s) IV Push every 8 hours  vancomycin  IVPB 1250 milliGRAM(s) IV Intermittent every 12 hours    Other medications:  acetaminophen     Tablet .. 975 milliGRAM(s) Oral every 8 hours  acetaminophen   IVPB .. 1000 milliGRAM(s) IV Intermittent once  celecoxib 200 milliGRAM(s) Oral every 12 hours  ketorolac   Injectable 15 milliGRAM(s) IV Push every 6 hours  pantoprazole    Tablet 40 milliGRAM(s) Oral before breakfast  senna 2 Tablet(s) Oral at bedtime  sodium chloride 0.9%. 1000 milliLiter(s) IV Continuous <Continuous>                            11.3   9.06  )-----------( 368      ( 09 Aug 2024 21:05 )             34.8     08-09    138  |  99  |  16.4  ----------------------------<  118<H>  4.1   |  23.0  |  0.51    Ca    9.3      09 Aug 2024 21:05    TPro  7.1  /  Alb  3.8  /  TBili  0.2<L>  /  DBili  x   /  AST  25  /  ALT  23  /  AlkPhos  94  08-09    RECENT CULTURES:      WBC Count: 9.06 K/uL (08-09-24 @ 21:05)    Creatinine: 0.51 mg/dL (08-09-24 @ 21:05)    ______________________________________________________  CARDIOLOGY    ______________________________________________________  RADIOLOGY
    Patient is a 58y old  Female who presents with a chief complaint of Right knee septic joint (09 Aug 2024 20:38)      HPI:  Patient had Right total knee arthroplasty May 23, 2024 went to see Dr Baptiste in office due to new onset of right posterior leg pain and swelling in her knee.  (09 Aug 2024 20:38)  Patient reports pulling pain in her knee for a few weeks that has worsened this week. Denies fever, chills, redness. Does note swelling. Went to ortho office and fluid analysis concerning for infection. She is admitted for abx and wash out.   Patient is a  reports active lifestyle. No Cardiac history.       PAST MEDICAL & SURGICAL HISTORY:  No pertinent past medical history    Surgical hx   H/O arthroscopy of knee  S/P wrist surgery  S/P cholecystectomy  S/P shoulder surgery          Social History:  Tabacco - denies  ETOH - social drinker on weekends  Illicit drug abuse - denies    FAMILY HISTORY:  FH: heart disease (Grandparent)  FHx: diabetes mellitus (Grandparent)        Allergies  penicillin (Hives; Pruritus)      HOME MEDICATIONS : NONE    REVIEW OF SYSTEMS:    CONSTITUTIONAL: No fever, weight loss, or fatigue  EYES: No eye pain, visual disturbances, or discharge  NECK: No pain or stiffness  RESPIRATORY: No cough, wheezing, chills or hemoptysis; No shortness of breath  CARDIOVASCULAR: No chest pain, palpitations, dizziness, or leg swelling  GASTROINTESTINAL: No abdominal or epigastric pain. No nausea, vomiting, or hematemesis; No diarrhea or constipation. No melena or hematochezia.  GENITOURINARY: No dysuria, frequency, hematuria, or incontinence  NEUROLOGICAL: No headaches, memory loss, loss of strength, numbness, or tremors  SKIN: No itching, burning, rashes, or lesions   LYMPH NODES: No enlarged glands  ENDOCRINE: No heat or cold intolerance; No hair loss  MUSCULOSKELETAL: as per HPI  PSYCHIATRIC: No depression, anxiety, mood swings, or difficulty sleeping  HEME/LYMPH: No easy bruising, or bleeding gums  ALLERGY AND IMMUNOLOGIC: No hives or eczema    MEDICATIONS  (STANDING):  ceFAZolin  Injectable. 2000 milliGRAM(s) IV Push once  chlorhexidine 2% Cloths 1 Application(s) Topical every 12 hours  melatonin 3 milliGRAM(s) Oral at bedtime  mupirocin 2% Ointment 1 Application(s) Both Nostrils two times a day  povidone iodine 10% Nasal Swab 1 Application(s) Both Nostrils once  sodium chloride 0.9%. 1000 milliLiter(s) (100 mL/Hr) IV Continuous <Continuous>  vancomycin  IVPB 1250 milliGRAM(s) IV Intermittent once    MEDICATIONS  (PRN):      Vital Signs Last 24 Hrs  T(C): 36.7 (10 Aug 2024 09:09), Max: 36.8 (09 Aug 2024 23:45)  T(F): 98 (10 Aug 2024 09:09), Max: 98.3 (09 Aug 2024 23:45)  HR: 82 (10 Aug 2024 09:09) (78 - 102)  BP: 121/71 (10 Aug 2024 09:09) (101/63 - 146/80)  BP(mean): --  RR: 18 (10 Aug 2024 09:09) (16 - 20)  SpO2: 98% (10 Aug 2024 09:09) (95% - 98%)    Parameters below as of 10 Aug 2024 09:09  Patient On (Oxygen Delivery Method): room air        PHYSICAL EXAM:    GENERAL: NAD, well-groomed, well-developed  HEAD:  Atraumatic, Normocephalic  EYES: EOMI, PERRLA, conjunctiva and sclera clear  NECK: Supple, No JVD, Normal thyroid  NERVOUS SYSTEM:  Alert & Oriented X3, Good concentration; Motor Strength 5/5 B/L upper and lower extremities; DTRs 2+ intact and symmetric  CHEST/LUNG: CTA  b/l,  no rales, rhonchi, wheezing, or rubs  HEART: Regular rate and rhythm; No murmurs, rubs, or gallops  ABDOMEN: Soft, Nontender, Nondistended; Bowel sounds present  EXTREMITIES: right knee swelling and Warmth  LYMPH: No lymphadenopathy noted  SKIN: No rashes or lesions    LABS:                        11.3   9.06  )-----------( 368      ( 09 Aug 2024 21:05 )             34.8     08-09    138  |  99  |  16.4  ----------------------------<  118<H>  4.1   |  23.0  |  0.51    Ca    9.3      09 Aug 2024 21:05    TPro  7.1  /  Alb  3.8  /  TBili  0.2<L>  /  DBili  x   /  AST  25  /  ALT  23  /  AlkPhos  94  08-09    PT/INR - ( 09 Aug 2024 21:05 )   PT: 12.2 sec;   INR: 1.10 ratio         PTT - ( 09 Aug 2024 21:05 )  PTT:27.1 sec  Urinalysis Basic - ( 09 Aug 2024 21:05 )    Color: x / Appearance: x / SG: x / pH: x  Gluc: 118 mg/dL / Ketone: x  / Bili: x / Urobili: x   Blood: x / Protein: x / Nitrite: x   Leuk Esterase: x / RBC: x / WBC x   Sq Epi: x / Non Sq Epi: x / Bacteria: x          RADIOLOGY & ADDITIONAL STUDIES:

## 2024-08-10 NOTE — DISCHARGE NOTE PROVIDER - NSDCMRMEDTOKEN_GEN_ALL_CORE_FT
CeleBREX 200 mg oral capsule: 1 cap(s) orally 2 times a day  coq 10 daily:   melatonin daily:   ox bile daily:    acetaminophen 325 mg oral tablet: 3 tab(s) orally every 8 hours  Aspirin EC 81 mg oral delayed release tablet: 1 tab(s) orally 2 times a day MDD: 2  cefTRIAXone: 2,000 milligram(s) intravenous once a day  CeleBREX 200 mg oral capsule: 1 cap(s) orally 2 times a day MDD: 2  coq 10 daily:   DAPTOmycin 500 mg intravenous injection: 500 milligram(s) intravenous every 24 hours  Eliquis 2.5 mg oral tablet: 1 tab(s) orally 2 times a day  melatonin daily:   Narcan 4 mg/0.1 mL nasal spray: 4 milligram(s) intranasally once a day use as directed  ox bile daily:   oxyCODONE 5 mg oral tablet: 1 tab(s) orally every 6 hours as needed for  moderate pain MDD: 4  pantoprazole 40 mg oral delayed release tablet: 1 tab(s) orally once a day  Senna 8.6 mg oral tablet: 2 tab(s) orally once a day (at bedtime)

## 2024-08-10 NOTE — PROGRESS NOTE ADULT - SUBJECTIVE AND OBJECTIVE BOX
VIRI DIXON  [id number]    History: 58y Female is status post right total knee washout POD #0. Patient is doing well and is comfortable. The patient's pain is controlled using the prescribed pain medications. The patient will be participating in physical therapy. Denies CP, SOB, dizziness, HA, fever/chills, numbness or tingling. No new complaints.    Vital Signs Last 24 Hrs  T(C): 36.8 (10 Aug 2024 16:33), Max: 36.8 (09 Aug 2024 23:45)  T(F): 98.2 (10 Aug 2024 16:33), Max: 98.3 (09 Aug 2024 23:45)  HR: 90 (10 Aug 2024 16:33) (71 - 102)  BP: 121/65 (10 Aug 2024 16:33) (101/63 - 146/80)  BP(mean): 91 (10 Aug 2024 15:30) (85 - 101)  RR: 17 (10 Aug 2024 16:33) (10 - 20)  SpO2: 94% (10 Aug 2024 16:33) (94% - 100%)    Parameters below as of 10 Aug 2024 16:33  Patient On (Oxygen Delivery Method): room air                              11.3   9.06  )-----------( 368      ( 09 Aug 2024 21:05 )             34.8     08-09    138  |  99  |  16.4  ----------------------------<  118<H>  4.1   |  23.0  |  0.51    Ca    9.3      09 Aug 2024 21:05    TPro  7.1  /  Alb  3.8  /  TBili  0.2<L>  /  DBili  x   /  AST  25  /  ALT  23  /  AlkPhos  94  08-09      General: Alert, awake, NAD  Physical exam: KI in place to RLE. The right knee prevena  dressing is clean, dry and intact-tube patent, under suction. Ace warp with No drainage, discharge, erythema, blistering or ecchymosis noted.   The calf is supple nontender b/l.   SILT. +AT/GC/EHL/FHL.   2+ DP pulse. BCR. No cyanosis.    Plan:   - DVT prophylaxis as prescribed, including use of compression devices and ankle pumps-lovenox 30mg bid  - Continue physical therapy  - Weight bearing status of surgical extremity: WBAT in KI AAT  - Incentive spirometry encouraged  - Pain control as clinically indicated  - f/u OR cx  - continue IV abx  - ID following   - medical management  - tentative plan for OR next week with Dr. Baptiste

## 2024-08-10 NOTE — DISCHARGE NOTE PROVIDER - CARE PROVIDERS DIRECT ADDRESSES
[email or web address]

## 2024-08-10 NOTE — CONSULT NOTE ADULT - ASSESSMENT
58F s/p Right total knee arthroplasty on 5/23/2024 admitted for double DAIR for right knee PJI. Seen in the office by Dr. Baptiste on 8/8 >> patient c/o posterior knee swelling and pain. Underwent arthrocentesis (no gross purulence).       8/8 Synovial fluid cell count with RBC 84,000, nuc cell 30,798 (N 72%)  8/8 Synovial culture - Gram w/o organisms. Culture ngtd   s/p DAIR 1 with antibiotics bead placement today    Follow surgical cultures   Would also request for Joint PCR   No leukocytosis, normal differential   Afebrile     Agree with empiric treatment with vancomycin + cefazolin pending cultures   Monitor vancomycin through; dose adjustment per pharmacy protocol     D/w ortho 
57 yo F with no PMHx but had Right total knee arthroplasty May 23, 2024 admitted for right knee septic arthritis.     Right knee septic arthritis  for OR today  - Rec ID consult  - follow cxs  - Pain control per ortho  - Bowel regimen  - incentive spirometer  - DVT ppx per ortho  - abx per ortho    dvt ppx: per ortho

## 2024-08-10 NOTE — PRE-ANESTHESIA EVALUATION ADULT - NSANTHADDINFOFT_GEN_ALL_CORE
58F w/no significant PMH presenting s/p TKR now presenting with septic arthritis for washout. Labs and EKG reviewed. No other workup needed, plan for General anesthesia.

## 2024-08-10 NOTE — DISCHARGE NOTE PROVIDER - NSDCCPTREATMENT_GEN_ALL_CORE_FT
PRINCIPAL PROCEDURE  Procedure: Arthrotomy of knee with washout  Findings and Treatment: Right knee incision and drainage with antibiotic bead placement

## 2024-08-10 NOTE — DISCHARGE NOTE PROVIDER - CARE PROVIDER_API CALL
Remi Baptiste  Orthopaedic Surgery  [address], [address] [place] [address]  Phone: [phone]  Fax: [phone]  Follow Up Time:     Merry Montgomery  Infectious Disease  [address] [place] [id number]  Phone: [phone]  Fax: [phone]  Follow Up Time:

## 2024-08-10 NOTE — DISCHARGE NOTE PROVIDER - NSDCFUADDINST_GEN_ALL_CORE_FT
The patient will be seen in the office between 2-3 weeks for wound check.   **Your first post-operative visit has been scheduled prior to your admission. PLEASE CONTACT OFFICE TO CONFIRM THE APPOINTMENT DATE. Staples will be removed at that time.  **  The  Prevena dressing is to be removed on post op day #7 and then replaced with gauze and tegaderm.   ** CONTACT THE OFFICE IF THE FOLLOWING DEVELOP:  - the dressing becomes soiled or saturated  - you develop a fever greater that 101F  - the wound becomes red or you develop blistering around the wound  * Patient may shower after post-op day #3.   * The patient will continue home PT consistent with total knee replacement protocol. Transition to outpatient PT will occur at the time of the first office visit.   * The patient will practice knee extension exercises regularly to minimize hamstring contraction.   * The patient is FULL weight bearing.  * The patient will continue ASPIRIN 81mg twice daily for 6 weeks after surgery for blood clot prevention.    *** The patient will continue ELIQUIS 2.5mg twice daily for 2 weeks and then begin ASPIRIN 81mg twice daily for 4 weeks for blood clot prevention.       *** While on aspirin, the patient will take daily omeprazole or other similar medication to protect the stomach from irritation.   * The patient will take OXYCODONE AND TYLENOL for pain control and adjust according to prescription and patient needs. Contact the office if pain increases while taking prescribed pain medications or related concerns develop.  * Celebrex will be taken twice daily for 3 weeks for pain control and prevention of excessive bone growth. Additional prescription may be requested at your office follow-up visit.   * The patient will take Senna S while taking oxycodone to prevent narcotic associated constipation.  Additionally, increase water intake (drink at least 8 glasses of water daily) and try adding fiber to the diet by eating fruits, vegetables and foods that are rich in grains. If constipation is experienced, contact the medical/primary care provider to discuss further treatment options.  * To avoid injury at home:  - continue use of rolling walker until cleared by physical therapist  - have family or friend remove all throw rug or objects in hallways that may present a trip hazard.  - if you experience any dizziness or medical concerns, call your medical doctor or  911.  * The implant may activate metal detection devices.     **Continue IV antibiotics as per infectious disease** The patient will be seen in the office between 2-3 weeks for wound check.   **Your first post-operative visit has been scheduled prior to your admission. PLEASE CONTACT OFFICE TO CONFIRM THE APPOINTMENT DATE. Staples will be removed at that time.  **  The  Prevena dressing is to be removed on post op day #7 and then replaced with gauze and tegaderm.   ** CONTACT THE OFFICE IF THE FOLLOWING DEVELOP:  - the dressing becomes soiled or saturated  - you develop a fever greater that 101F  - the wound becomes red or you develop blistering around the wound  * Patient may shower after post-op day #3.   * The patient will continue home PT consistent with total knee replacement protocol. Transition to outpatient PT will occur at the time of the first office visit.   * The patient will practice knee extension exercises regularly to minimize hamstring contraction.   * The patient is FULL weight bearing.  *** The patient will continue ELIQUIS 2.5mg twice daily for 2 weeks and then begin ASPIRIN 81mg twice daily for 4 weeks for blood clot prevention.   *** While on aspirin, the patient will take daily omeprazole or other similar medication to protect the stomach from irritation.   * The patient will take OXYCODONE AND TYLENOL for pain control and adjust according to prescription and patient needs. Contact the office if pain increases while taking prescribed pain medications or related concerns develop.  * Celebrex will be taken twice daily for 3 weeks for pain control and prevention of excessive bone growth. Additional prescription may be requested at your office follow-up visit.   * The patient will take Senna S while taking oxycodone to prevent narcotic associated constipation.  Additionally, increase water intake (drink at least 8 glasses of water daily) and try adding fiber to the diet by eating fruits, vegetables and foods that are rich in grains. If constipation is experienced, contact the medical/primary care provider to discuss further treatment options.  * To avoid injury at home:  - continue use of rolling walker until cleared by physical therapist  - have family or friend remove all throw rug or objects in hallways that may present a trip hazard.  - if you experience any dizziness or medical concerns, call your medical doctor or  911.  * The implant may activate metal detection devices.     **Continue IV antibiotics as per infectious disease** The patient will be seen in the office between 2-3 weeks for wound check.    Infectious disease recommendations - continue with daptomycin 500mg IV daily + ceftriaxone 2g IV daily through 9/30/24 weekly CBC CMP ESR CRP CK in the absence of micro data.    Follow up with infectious disease in 1 week.      **Your first post-operative visit has been scheduled prior to your admission. PLEASE CONTACT OFFICE TO CONFIRM THE APPOINTMENT DATE. Staples will be removed at that time.  **  Remove the ace wrap and white gauze wrap on Friday 8/23/24.  Remove the Prevena dressing on Monday 8/26/24. Hold the power button until the battery quintana off, and then peel bandage off. After the Prevena is Removed apply the Silver dressing and keep on for 7 days.      ** CONTACT THE OFFICE IF THE FOLLOWING DEVELOP:  - the dressing becomes soiled or saturated  - you develop a fever greater that 101F  - the wound becomes red or you develop blistering around the wound  * Patient may shower after post-op day #3.   * The patient will continue home PT consistent with total knee replacement protocol. Transition to outpatient PT will occur at the time of the first office visit.   * The patient will practice knee extension exercises regularly to minimize hamstring contraction.   * The patient is FULL weight bearing.  *** The patient will continue ELIQUIS 2.5mg twice daily for 2 weeks (last dose to be taken on 9/2/24 at night) and then begin ASPIRIN 81mg twice daily )begin on 9/3/24) for 4 weeks for blood clot prevention.   *** While on aspirin, the patient will take daily omeprazole or other similar medication to protect the stomach from irritation.   * The patient will take OXYCODONE AND TYLENOL for pain control and adjust according to prescription and patient needs. Contact the office if pain increases while taking prescribed pain medications or related concerns develop.  * Celebrex will be taken twice daily for 3 weeks for pain control and prevention of excessive bone growth. Additional prescription may be requested at your office follow-up visit.   * The patient will take Senna S while taking oxycodone to prevent narcotic associated constipation.  Additionally, increase water intake (drink at least 8 glasses of water daily) and try adding fiber to the diet by eating fruits, vegetables and foods that are rich in grains. If constipation is experienced, contact the medical/primary care provider to discuss further treatment options.  * To avoid injury at home:  - continue use of rolling walker until cleared by physical therapist  - have family or friend remove all throw rug or objects in hallways that may present a trip hazard.  - if you experience any dizziness or medical concerns, call your medical doctor or  911.  * The implant may activate metal detection devices.     **Continue IV antibiotics as per infectious disease**

## 2024-08-10 NOTE — DISCHARGE NOTE PROVIDER - NSDCFUSCHEDAPPT_GEN_ALL_CORE_FT
Remi Baptiste  [facility] Physician [facility]  ORTHOSURG [address]  Scheduled Appointment: 08/22/2024     Remi Baptiste  [facility]  ORTHOSURG [address]  Scheduled Appointment: 08/22/2024    Remi Baptiste  [facility]  ORTHOSURG [address]  Scheduled Appointment: 09/10/2024    [facility]  ORTHOSURG [address]  Scheduled Appointment: 10/02/2024     Remi Baptiste  [facility]  ORTHOSURG [address]  Scheduled Appointment: 09/10/2024    [facility]  ORTHOSURG [address]  Scheduled Appointment: 10/02/2024

## 2024-08-10 NOTE — DISCHARGE NOTE PROVIDER - HOSPITAL COURSE
The patient underwent a RIGHT TOTAL KNEE washout and poly exchange with antibiotic bead placement on 8/10/24 for infected right total knee hardware. The patient was IV antibiotics and followed by infectious disease team. The patient received antibiotics consistent with SCIP guidelines. The patient underwent a repeat right knee washout with poly exchange and antibiotic bead removal on 8/XXXXXXXX/24. The patient underwent the procedure and had no intra-operative complications. Post-operatively, the patient was seen by medicine and PT. The patient received LOVENOX for DVTP. The patient received pain medications per orthopedic pain management pathway and the pain was appropriately controlled. The patient did not have any post-operative medical complications. The patient received a PICC line for antibiotics. The patient was discharged in stable condition. The patient underwent a RIGHT TOTAL KNEE washout and poly exchange with antibiotic bead placement on 8/10/24 for infected right total knee hardware. The patient was IV antibiotics and followed by infectious disease team. The patient received antibiotics consistent with SCIP guidelines. The patient received LOVENOX for DVTP while awaiting second procedure. The patient underwent a repeat right knee washout with poly exchange and antibiotic bead removal on 8/19/24. The patient underwent the procedure and had no intra-operative complications. Post-operatively, the patient was seen by medicine and PT. The patient received ELIQUIS for DVTP post-operatively. The patient received pain medications per orthopedic pain management pathway and the pain was appropriately controlled. The patient did not have any post-operative medical complications. The patient received a PICC line for antibiotics. The patient was discharged in stable condition.

## 2024-08-10 NOTE — DISCHARGE NOTE PROVIDER - NSDCHHATTENDCERT_GEN_ALL_CORE
Patient refused mouth care My signature below certifies that the above stated patient is homebound and upon completion of the Face-To-Face encounter, has the need for intermittent skilled nursing, physical therapy and/or speech or occupational therapy services in their home for their current diagnosis as outlined in their initial plan of care. These services will continue to be monitored by myself or another physician.

## 2024-08-11 LAB
ANION GAP SERPL CALC-SCNC: 11 MMOL/L — SIGNIFICANT CHANGE UP (ref 5–17)
BUN SERPL-MCNC: 11 MG/DL — SIGNIFICANT CHANGE UP (ref 8–20)
CALCIUM SERPL-MCNC: 8.3 MG/DL — LOW (ref 8.4–10.5)
CHLORIDE SERPL-SCNC: 108 MMOL/L — SIGNIFICANT CHANGE UP (ref 96–108)
CO2 SERPL-SCNC: 23 MMOL/L — SIGNIFICANT CHANGE UP (ref 22–29)
CREAT SERPL-MCNC: 0.49 MG/DL — LOW (ref 0.5–1.3)
EGFR: 109 ML/MIN/1.73M2 — SIGNIFICANT CHANGE UP
GLUCOSE SERPL-MCNC: 115 MG/DL — HIGH (ref 70–99)
GRAM STN FLD: SIGNIFICANT CHANGE UP
HCT VFR BLD CALC: 28.4 % — LOW (ref 34.5–45)
HGB BLD-MCNC: 9.3 G/DL — LOW (ref 11.5–15.5)
MCHC RBC-ENTMCNC: 28.1 PG — SIGNIFICANT CHANGE UP (ref 27–34)
MCHC RBC-ENTMCNC: 32.7 GM/DL — SIGNIFICANT CHANGE UP (ref 32–36)
MCV RBC AUTO: 85.8 FL — SIGNIFICANT CHANGE UP (ref 80–100)
PLATELET # BLD AUTO: 341 K/UL — SIGNIFICANT CHANGE UP (ref 150–400)
POTASSIUM SERPL-MCNC: 4.4 MMOL/L — SIGNIFICANT CHANGE UP (ref 3.5–5.3)
POTASSIUM SERPL-SCNC: 4.4 MMOL/L — SIGNIFICANT CHANGE UP (ref 3.5–5.3)
RBC # BLD: 3.31 M/UL — LOW (ref 3.8–5.2)
RBC # FLD: 12.1 % — SIGNIFICANT CHANGE UP (ref 10.3–14.5)
SODIUM SERPL-SCNC: 142 MMOL/L — SIGNIFICANT CHANGE UP (ref 135–145)
SPECIMEN SOURCE: SIGNIFICANT CHANGE UP
VANCOMYCIN TROUGH SERPL-MCNC: 10.5 UG/ML — SIGNIFICANT CHANGE UP (ref 10–20)
WBC # BLD: 12.58 K/UL — HIGH (ref 3.8–10.5)
WBC # FLD AUTO: 12.58 K/UL — HIGH (ref 3.8–10.5)

## 2024-08-11 PROCEDURE — 99232 SBSQ HOSP IP/OBS MODERATE 35: CPT

## 2024-08-11 RX ORDER — CYCLOBENZAPRINE HCL 10 MG
5 TABLET ORAL THREE TIMES A DAY
Refills: 0 | Status: DISCONTINUED | OUTPATIENT
Start: 2024-08-11 | End: 2024-08-19

## 2024-08-11 RX ADMIN — ENOXAPARIN SODIUM 30 MILLIGRAM(S): 100 INJECTION SUBCUTANEOUS at 05:39

## 2024-08-11 RX ADMIN — Medication 166.67 MILLIGRAM(S): at 23:52

## 2024-08-11 RX ADMIN — OXYCODONE HYDROCHLORIDE 10 MILLIGRAM(S): 5 TABLET ORAL at 17:13

## 2024-08-11 RX ADMIN — HYDROMORPHONE HYDROCHLORIDE 4 MILLIGRAM(S): 2 TABLET ORAL at 02:55

## 2024-08-11 RX ADMIN — Medication 40 MILLIGRAM(S): at 05:39

## 2024-08-11 RX ADMIN — CEFAZOLIN SODIUM 2000 MILLIGRAM(S): 2 INJECTION, SOLUTION INTRAVENOUS at 19:36

## 2024-08-11 RX ADMIN — KETOROLAC TROMETHAMINE 15 MILLIGRAM(S): 30 INJECTION, SOLUTION INTRAMUSCULAR at 11:29

## 2024-08-11 RX ADMIN — Medication 166.67 MILLIGRAM(S): at 12:32

## 2024-08-11 RX ADMIN — CELECOXIB 200 MILLIGRAM(S): 400 CAPSULE ORAL at 17:50

## 2024-08-11 RX ADMIN — KETOROLAC TROMETHAMINE 15 MILLIGRAM(S): 30 INJECTION, SOLUTION INTRAMUSCULAR at 06:35

## 2024-08-11 RX ADMIN — ACETAMINOPHEN 975 MILLIGRAM(S): 325 TABLET ORAL at 14:11

## 2024-08-11 RX ADMIN — CEFAZOLIN SODIUM 2000 MILLIGRAM(S): 2 INJECTION, SOLUTION INTRAVENOUS at 03:58

## 2024-08-11 RX ADMIN — KETOROLAC TROMETHAMINE 15 MILLIGRAM(S): 30 INJECTION, SOLUTION INTRAMUSCULAR at 05:39

## 2024-08-11 RX ADMIN — Medication 2 TABLET(S): at 21:21

## 2024-08-11 RX ADMIN — OXYCODONE HYDROCHLORIDE 10 MILLIGRAM(S): 5 TABLET ORAL at 17:50

## 2024-08-11 RX ADMIN — Medication 3 MILLIGRAM(S): at 00:50

## 2024-08-11 RX ADMIN — CELECOXIB 200 MILLIGRAM(S): 400 CAPSULE ORAL at 06:35

## 2024-08-11 RX ADMIN — KETOROLAC TROMETHAMINE 15 MILLIGRAM(S): 30 INJECTION, SOLUTION INTRAMUSCULAR at 12:05

## 2024-08-11 RX ADMIN — HYDROMORPHONE HYDROCHLORIDE 4 MILLIGRAM(S): 2 TABLET ORAL at 01:56

## 2024-08-11 RX ADMIN — OXYCODONE HYDROCHLORIDE 10 MILLIGRAM(S): 5 TABLET ORAL at 21:21

## 2024-08-11 RX ADMIN — ACETAMINOPHEN 975 MILLIGRAM(S): 325 TABLET ORAL at 22:00

## 2024-08-11 RX ADMIN — ENOXAPARIN SODIUM 30 MILLIGRAM(S): 100 INJECTION SUBCUTANEOUS at 17:14

## 2024-08-11 RX ADMIN — ACETAMINOPHEN 1000 MILLIGRAM(S): 325 TABLET ORAL at 06:35

## 2024-08-11 RX ADMIN — ACETAMINOPHEN 400 MILLIGRAM(S): 325 TABLET ORAL at 05:38

## 2024-08-11 RX ADMIN — KETOROLAC TROMETHAMINE 15 MILLIGRAM(S): 30 INJECTION, SOLUTION INTRAMUSCULAR at 00:41

## 2024-08-11 RX ADMIN — ACETAMINOPHEN 975 MILLIGRAM(S): 325 TABLET ORAL at 14:55

## 2024-08-11 RX ADMIN — CEFAZOLIN SODIUM 2000 MILLIGRAM(S): 2 INJECTION, SOLUTION INTRAVENOUS at 11:29

## 2024-08-11 RX ADMIN — Medication 5 MILLIGRAM(S): at 21:22

## 2024-08-11 RX ADMIN — KETOROLAC TROMETHAMINE 15 MILLIGRAM(S): 30 INJECTION, SOLUTION INTRAMUSCULAR at 01:39

## 2024-08-11 RX ADMIN — CELECOXIB 200 MILLIGRAM(S): 400 CAPSULE ORAL at 17:13

## 2024-08-11 RX ADMIN — ACETAMINOPHEN 975 MILLIGRAM(S): 325 TABLET ORAL at 21:21

## 2024-08-11 RX ADMIN — CELECOXIB 200 MILLIGRAM(S): 400 CAPSULE ORAL at 05:39

## 2024-08-11 RX ADMIN — OXYCODONE HYDROCHLORIDE 10 MILLIGRAM(S): 5 TABLET ORAL at 22:20

## 2024-08-11 NOTE — PROGRESS NOTE ADULT - SUBJECTIVE AND OBJECTIVE BOX
VIRI DIXON  [id number]    History: 58y Female is status post right total knee arthroplasty POD #1. Patient is doing well and is comfortable. The patient's pain is controlled using the prescribed pain medications. The patient is participating in physical therapy. Denies CP, SOB, dizziness, HA, fever/chills, numbness or tingling. No new complaints.    Vital Signs Last 24 Hrs  T(C): 36.7 (11 Aug 2024 04:39), Max: 36.8 (10 Aug 2024 16:33)  T(F): 98 (11 Aug 2024 04:39), Max: 98.2 (10 Aug 2024 16:33)  HR: 90 (11 Aug 2024 04:39) (65 - 93)  BP: 109/66 (11 Aug 2024 04:39) (109/66 - 139/85)  BP(mean): 91 (10 Aug 2024 15:30) (85 - 101)  RR: 17 (11 Aug 2024 04:39) (10 - 20)  SpO2: 96% (11 Aug 2024 04:39) (93% - 100%)    Parameters below as of 11 Aug 2024 04:39  Patient On (Oxygen Delivery Method): room air                              9.3    12.58 )-----------( 341      ( 11 Aug 2024 05:27 )             28.4     08-11    142  |  108  |  11.0  ----------------------------<  115<H>  4.4   |  23.0  |  0.49<L>    Ca    8.3<L>      11 Aug 2024 05:27    TPro  7.1  /  Alb  3.8  /  TBili  0.2<L>  /  DBili  x   /  AST  25  /  ALT  23  /  AlkPhos  94  08-09      General: Alert, awake, NAD  Physical exam: KI in place to RLE. The right knee prevena dressing is intact-tube patent, under suction. Ace wrap with No drainage, discharge, erythema, blistering or ecchymosis noted.   The calf is supple nontender b/l.   SILT. +AT/GC/EHL/FHL.   2+ DP pulse. BCR. No cyanosis.      Plan:   - DVT prophylaxis as prescribed, including use of compression devices and ankle pumps-lovenox 30mg bid  - Continue physical therapy  - Weight bearing status of surgical extremity: WBAT in KI AAT  - Incentive spirometry encouraged  - Pain control as clinically indicated  - f/u OR cx  - continue IV abx  - ID following   - medical management  - tentative plan for OR next week with Dr. Baptiste     - f/u AM labs

## 2024-08-11 NOTE — PROGRESS NOTE ADULT - ASSESSMENT
57 yo F with no PMHx but had Right total knee arthroplasty May 23, 2024 admitted for right knee septic arthritis.     Right knee septic arthritis  s/p   - Rec ID consult  - follow cxs  - Pain control per ortho  - Bowel regimen  - incentive spirometer  - DVT ppx per ortho  - abx per ortho    dvt ppx: per ortho       59 yo F with no PMHx but had Right total knee arthroplasty May 23, 2024 admitted for right knee septic arthritis.     Right knee septic arthritis  s/p Right knee incision and drainage with antibiotic bead placement POD#1  - aGREE WITH ID consult  - follow cxs  - Pain control per ortho  - Bowel regimen  - incentive spirometer  - DVT ppx per ortho  - abx per ortho    Post op Anemia  - monitor hemoglobin    dvt ppx: per ortho

## 2024-08-11 NOTE — PROGRESS NOTE ADULT - SUBJECTIVE AND OBJECTIVE BOX
[facility] Division of Hospital Medicine    Chief Complaint:  Right knee septic joint    SUBJECTIVE / OVERNIGHT EVENTS: Patient seen and examined. Reports she did not sleep overnight due to pain. However now pain is controlled. Had BM yesterday prior to surgery. No chest pain, sob or abdominal pain. Tolerating diet.     Patient denies chest pain, SOB, abd pain, N/V, fever, chills, dysuria or any other complaints. All remainder ROS negative.     MEDICATIONS  (STANDING):  acetaminophen     Tablet .. 975 milliGRAM(s) Oral every 8 hours  ceFAZolin  Injectable. 2000 milliGRAM(s) IV Push every 8 hours  celecoxib 200 milliGRAM(s) Oral every 12 hours  enoxaparin Injectable 30 milliGRAM(s) SubCutaneous every 12 hours  ketorolac   Injectable 15 milliGRAM(s) IV Push every 6 hours  pantoprazole    Tablet 40 milliGRAM(s) Oral before breakfast  senna 2 Tablet(s) Oral at bedtime  sodium chloride 0.9%. 1000 milliLiter(s) (125 mL/Hr) IV Continuous <Continuous>  vancomycin  IVPB 1250 milliGRAM(s) IV Intermittent every 12 hours    MEDICATIONS  (PRN):  aluminum hydroxide/magnesium hydroxide/simethicone Suspension 30 milliLiter(s) Oral four times a day PRN Indigestion  HYDROmorphone   Tablet 4 milliGRAM(s) Oral every 3 hours PRN Severe Pain (7 - 10)  HYDROmorphone  Injectable 0.5 milliGRAM(s) IV Push every 3 hours PRN Breakthrough pain  melatonin 3 milliGRAM(s) Oral at bedtime PRN Insomnia  ondansetron Injectable 4 milliGRAM(s) IV Push every 6 hours PRN Nausea and/or Vomiting  oxyCODONE    IR 5 milliGRAM(s) Oral every 3 hours PRN Mild Pain (1 - 3)  oxyCODONE    IR 10 milliGRAM(s) Oral every 3 hours PRN Moderate Pain (4 - 6)        I&O's Summary    10 Aug 2024 07:01  -  11 Aug 2024 07:00  --------------------------------------------------------  IN: 1055 mL / OUT: 1150 mL / NET: -95 mL        PHYSICAL EXAM:  Vital Signs Last 24 Hrs  T(C): 36.7 (11 Aug 2024 04:39), Max: 36.8 (10 Aug 2024 16:33)  T(F): 98 (11 Aug 2024 04:39), Max: 98.2 (10 Aug 2024 16:33)  HR: 90 (11 Aug 2024 04:39) (65 - 93)  BP: 109/66 (11 Aug 2024 04:39) (109/66 - 139/85)  BP(mean): 91 (10 Aug 2024 15:30) (85 - 101)  RR: 17 (11 Aug 2024 04:39) (10 - 20)  SpO2: 96% (11 Aug 2024 04:39) (93% - 100%)    Parameters below as of 11 Aug 2024 04:39  Patient On (Oxygen Delivery Method): room air        GENERAL: NAD, well-groomed, well-developed  HEAD:  Atraumatic, Normocephalic  EYES: EOMI, PERRLA, conjunctiva and sclera clear  NECK: Supple, No JVD, Normal thyroid  NERVOUS SYSTEM:  Alert & Oriented X3, Good concentration; Motor Strength 5/5 B/L upper and lower extremities; DTRs 2+ intact and symmetric  CHEST/LUNG: CTA  b/l,  no rales, rhonchi, wheezing, or rubs  HEART: Regular rate and rhythm; No murmurs, rubs, or gallops  ABDOMEN: Soft, Nontender, Nondistended; Bowel sounds present  EXTREMITIES: right ;eg brace, dressing c/d/i  LYMPH: No lymphadenopathy noted  SKIN: No rashes or lesions    LABS:                        9.3    12.58 )-----------( 341      ( 11 Aug 2024 05:27 )             28.4     08-11    142  |  108  |  11.0  ----------------------------<  115<H>  4.4   |  23.0  |  0.49<L>    Ca    8.3<L>      11 Aug 2024 05:27    TPro  7.1  /  Alb  3.8  /  TBili  0.2<L>  /  DBili  x   /  AST  25  /  ALT  23  /  AlkPhos  94  08-09    PT/INR - ( 09 Aug 2024 21:05 )   PT: 12.2 sec;   INR: 1.10 ratio         PTT - ( 09 Aug 2024 21:05 )  PTT:27.1 sec      Urinalysis Basic - ( 11 Aug 2024 05:27 )    Color: x / Appearance: x / SG: x / pH: x  Gluc: 115 mg/dL / Ketone: x  / Bili: x / Urobili: x   Blood: x / Protein: x / Nitrite: x   Leuk Esterase: x / RBC: x / WBC x   Sq Epi: x / Non Sq Epi: x / Bacteria: x        CAPILLARY BLOOD GLUCOSE            RADIOLOGY & ADDITIONAL TESTS:  Results Reviewed:   Imaging Personally Reviewed:  Electrocardiogram Personally Reviewed:

## 2024-08-12 PROCEDURE — 99232 SBSQ HOSP IP/OBS MODERATE 35: CPT

## 2024-08-12 RX ORDER — POLYETHYLENE GLYCOL 3350 17 G/17G
17 POWDER, FOR SOLUTION ORAL DAILY
Refills: 0 | Status: DISCONTINUED | OUTPATIENT
Start: 2024-08-12 | End: 2024-08-19

## 2024-08-12 RX ADMIN — Medication 5 MILLIGRAM(S): at 08:04

## 2024-08-12 RX ADMIN — ACETAMINOPHEN 975 MILLIGRAM(S): 325 TABLET ORAL at 21:50

## 2024-08-12 RX ADMIN — Medication 5 MILLIGRAM(S): at 23:04

## 2024-08-12 RX ADMIN — ACETAMINOPHEN 975 MILLIGRAM(S): 325 TABLET ORAL at 13:15

## 2024-08-12 RX ADMIN — Medication 40 MILLIGRAM(S): at 04:43

## 2024-08-12 RX ADMIN — OXYCODONE HYDROCHLORIDE 5 MILLIGRAM(S): 5 TABLET ORAL at 21:49

## 2024-08-12 RX ADMIN — OXYCODONE HYDROCHLORIDE 5 MILLIGRAM(S): 5 TABLET ORAL at 09:00

## 2024-08-12 RX ADMIN — CELECOXIB 200 MILLIGRAM(S): 400 CAPSULE ORAL at 18:11

## 2024-08-12 RX ADMIN — ACETAMINOPHEN 975 MILLIGRAM(S): 325 TABLET ORAL at 05:15

## 2024-08-12 RX ADMIN — CELECOXIB 200 MILLIGRAM(S): 400 CAPSULE ORAL at 19:00

## 2024-08-12 RX ADMIN — OXYCODONE HYDROCHLORIDE 5 MILLIGRAM(S): 5 TABLET ORAL at 12:25

## 2024-08-12 RX ADMIN — ENOXAPARIN SODIUM 30 MILLIGRAM(S): 100 INJECTION SUBCUTANEOUS at 18:11

## 2024-08-12 RX ADMIN — CEFAZOLIN SODIUM 2000 MILLIGRAM(S): 2 INJECTION, SOLUTION INTRAVENOUS at 11:28

## 2024-08-12 RX ADMIN — Medication 166.67 MILLIGRAM(S): at 22:58

## 2024-08-12 RX ADMIN — POLYETHYLENE GLYCOL 3350 17 GRAM(S): 17 POWDER, FOR SOLUTION ORAL at 11:30

## 2024-08-12 RX ADMIN — OXYCODONE HYDROCHLORIDE 5 MILLIGRAM(S): 5 TABLET ORAL at 13:15

## 2024-08-12 RX ADMIN — ENOXAPARIN SODIUM 30 MILLIGRAM(S): 100 INJECTION SUBCUTANEOUS at 04:43

## 2024-08-12 RX ADMIN — ACETAMINOPHEN 975 MILLIGRAM(S): 325 TABLET ORAL at 04:42

## 2024-08-12 RX ADMIN — CEFAZOLIN SODIUM 2000 MILLIGRAM(S): 2 INJECTION, SOLUTION INTRAVENOUS at 18:11

## 2024-08-12 RX ADMIN — OXYCODONE HYDROCHLORIDE 5 MILLIGRAM(S): 5 TABLET ORAL at 22:49

## 2024-08-12 RX ADMIN — CEFAZOLIN SODIUM 2000 MILLIGRAM(S): 2 INJECTION, SOLUTION INTRAVENOUS at 03:59

## 2024-08-12 RX ADMIN — CELECOXIB 200 MILLIGRAM(S): 400 CAPSULE ORAL at 04:42

## 2024-08-12 RX ADMIN — ACETAMINOPHEN 975 MILLIGRAM(S): 325 TABLET ORAL at 12:25

## 2024-08-12 RX ADMIN — OXYCODONE HYDROCHLORIDE 5 MILLIGRAM(S): 5 TABLET ORAL at 18:15

## 2024-08-12 RX ADMIN — OXYCODONE HYDROCHLORIDE 5 MILLIGRAM(S): 5 TABLET ORAL at 08:03

## 2024-08-12 RX ADMIN — Medication 166.67 MILLIGRAM(S): at 11:19

## 2024-08-12 RX ADMIN — ACETAMINOPHEN 975 MILLIGRAM(S): 325 TABLET ORAL at 22:18

## 2024-08-12 RX ADMIN — CELECOXIB 200 MILLIGRAM(S): 400 CAPSULE ORAL at 05:15

## 2024-08-12 RX ADMIN — OXYCODONE HYDROCHLORIDE 5 MILLIGRAM(S): 5 TABLET ORAL at 19:00

## 2024-08-12 NOTE — PROGRESS NOTE ADULT - SUBJECTIVE AND OBJECTIVE BOX
VIRI DIXON  [id number]    History: 58y Female is status post right total knee washout POD #0. Patient is doing well and is comfortable. The patient's pain is controlled using the prescribed pain medications. The patient will be participating in physical therapy. Denies CP, SOB, dizziness, HA, fever/chills, numbness or tingling. No new complaints.    ICU Vital Signs Last 24 Hrs  T(C): 36.8 (12 Aug 2024 04:34), Max: 36.8 (11 Aug 2024 09:42)  T(F): 98.2 (12 Aug 2024 04:34), Max: 98.2 (11 Aug 2024 09:42)  HR: 79 (12 Aug 2024 04:34) (79 - 88)  BP: 113/71 (12 Aug 2024 04:34) (112/68 - 126/77)  BP(mean): --  ABP: --  ABP(mean): --  RR: 18 (12 Aug 2024 04:34) (18 - 19)  SpO2: 93% (12 Aug 2024 04:34) (93% - 95%)    O2 Parameters below as of 12 Aug 2024 04:34  Patient On (Oxygen Delivery Method): room air                              9.3    12.58 )-----------( 341      ( 11 Aug 2024 05:27 )             28.4         General: Alert, awake, NAD  Physical exam: KI in place to RLE. KI opened, Ace wrap opened- The right knee prevena  dressing is clean, dry and intact-tube patent, under suction. Ace warp with No drainage, discharge, erythema, blistering or ecchymosis noted.   The calf is supple nontender b/l.   SILT. +AT/GC/EHL/FHL.   2+ DP pulse. BCR. No cyanosis.    KI and ace wrap placed after exam.     Culture - Joint (08.10.24 @ 20:32)    Gram Stain:   Few polymorphonuclear leukocytes per low power field  No organisms seen per oil power field   Specimen Source: Knee R Knee Synovial Fl #3    Culture - Joint (08.10.24 @ 20:31)    Gram Stain:   Few polymorphonuclear leukocytes per low power field  No organisms seen per oil power field   Specimen Source: Knee R Knee Synovial Fl #2    Culture - Joint (08.10.24 @ 20:30)    Gram Stain:   Few polymorphonuclear leukocytes per low power field  No organisms seen per oil power field   Specimen Source: Knee R Knee Synovial Fl #1      Plan:   - DVT prophylaxis as prescribed, including use of compression devices and ankle pumps-lovenox 30mg bid  - Continue physical therapy  - Weight bearing status of surgical extremity: WBAT in KI AAT  - Incentive spirometry encouraged  - Pain control as clinically indicated  - f/u OR cx  - continue IV abx  - ID consult pending  - medical management  - tentative plan for OR Friday 8/16/24 vs 8/19/24 Monday week with Dr. Baptiste       VIRI DIXON  [id number]    History: 58y Female is status post right total knee washout, poly exchange and antibiotic bead placement POD #2. Patient is doing well and is comfortable. The patient's pain is controlled using the prescribed pain medications. The patient will be participating in physical therapy. Denies CP, SOB, dizziness, HA, fever/chills, numbness or tingling. No new complaints.    ICU Vital Signs Last 24 Hrs  T(C): 36.8 (12 Aug 2024 04:34), Max: 36.8 (11 Aug 2024 09:42)  T(F): 98.2 (12 Aug 2024 04:34), Max: 98.2 (11 Aug 2024 09:42)  HR: 79 (12 Aug 2024 04:34) (79 - 88)  BP: 113/71 (12 Aug 2024 04:34) (112/68 - 126/77)  BP(mean): --  ABP: --  ABP(mean): --  RR: 18 (12 Aug 2024 04:34) (18 - 19)  SpO2: 93% (12 Aug 2024 04:34) (93% - 95%)    O2 Parameters below as of 12 Aug 2024 04:34  Patient On (Oxygen Delivery Method): room air                              9.3    12.58 )-----------( 341      ( 11 Aug 2024 05:27 )             28.4         General: Alert, awake, NAD  Physical exam: KI in place to RLE. KI opened, Ace wrap opened- The right knee prevena  dressing is clean, dry and intact-tube patent, under suction. Ace wrap with No drainage, discharge, erythema, blistering or ecchymosis noted.   The calf is supple nontender b/l.   SILT. +AT/GC/EHL/FHL.   2+ DP pulse. BCR. No cyanosis.    KI and ace wrap placed after exam.     Culture - Joint (08.10.24 @ 20:32)    Gram Stain:   Few polymorphonuclear leukocytes per low power field  No organisms seen per oil power field   Specimen Source: Knee R Knee Synovial Fl #3    Culture - Joint (08.10.24 @ 20:31)    Gram Stain:   Few polymorphonuclear leukocytes per low power field  No organisms seen per oil power field   Specimen Source: Knee R Knee Synovial Fl #2    Culture - Joint (08.10.24 @ 20:30)    Gram Stain:   Few polymorphonuclear leukocytes per low power field  No organisms seen per oil power field   Specimen Source: Knee R Knee Synovial Fl #1      Plan:   - DVT prophylaxis as prescribed, including use of compression devices and ankle pumps-lovenox 30mg bid  - Continue physical therapy  - Weight bearing status of surgical extremity: WBAT in KI AAT  - Incentive spirometry encouraged  - Pain control as clinically indicated  - f/u OR cx  - continue IV abx  - ID consult pending  - medical management  - tentative plan for OR Friday 8/16/24 vs 8/19/24 Monday week with Dr. Baptiste

## 2024-08-12 NOTE — PROGRESS NOTE ADULT - NS ATTEND AMEND GEN_ALL_CORE FT
Patient doing well POD#2 s/p Right knee PJI DAIR with antibiotic bead placement.  OR cultures pending.  ID to evaluate today.  On Ancef and vanco broad spectrum pending cultures.  Right knee prevena dry and intact.  RLE NVID.  WBAT in knee immobilizer, no knee ROM.  Lovenox 30mg BID.  Return to OR for second DAIR and bead removal in 1 week.  Will maintain inpatient due to need for IV antibiotics.  All questions answered and patient agrees with plan.
Patient is see and evaluated, chart reviewed in detail, agree with assessment and plan of the NP  patient's pain is well controlled, has no complains  CTA b/l   right knee Joint area is covered with clean dressing, no bleeding or soaking  Will continue with current plan of care  d/c IV fluids.  Cultures to follow   monitor CBC  continue antibiotics as per ID   Medicine team is signing off, please call as needed

## 2024-08-12 NOTE — PROGRESS NOTE ADULT - SUBJECTIVE AND OBJECTIVE BOX
[facility]  INFECTIOUS DISEASES at [place] and [place]  ===============================================================                  Faraz Calhoun MD               Diplomates American Board of Internal Medicine & Infectious Diseases                * [place] Office - Appt - Tel  [phone] Fax [phone]                * [place] Office - Appt - Tel [phone] Fax [phone]                                  Hospital Consult line:  [phone]  ==============================================================    VIRI DIXON [id number]    Follow up: right knee PJI     No acute events   afebrile   hemodynamically stable     I have personally reviewed the labs and data; pertinent labs and data are listed in this note; please see below.     _______________________________________________________________  REVIEW OF SYSTEMS  +constipated, taking laxatives. Tolerating antibiotics w/o noticeable side effects. No fever or chills.   ________________________________________________________________  Allergies:  penicillin (Hives; Pruritus)    ________________________________________________________________  PHYSICAL EXAM  GEN: in NAD, sitting in sofa   HEENT: Anicteric sclerae  LUNGS: eupneic.   :  No Wilkinson catheter  MSK: right knee in immobilizer. Surgical area overed with dressings   NEUROLOGIC: AAOx4, moving all extremities   PSYCHIATRIC: Appropriate affect and mood  SKIN: No rash or jaundice  LINES: PIV   ________________________________________________________________  Vitals:  T(F): 98.3 (12 Aug 2024 12:20), Max: 98.8 (12 Aug 2024 09:03)  HR: 88 (12 Aug 2024 12:20)  BP: 125/74 (12 Aug 2024 12:20)  RR: 18 (12 Aug 2024 12:20)  SpO2: 97% (12 Aug 2024 12:20) (93% - 97%)  temp max in last 48H T(F): , Max: 98.8 (08-12-24 @ 09:03)    Current Antibiotics:  ceFAZolin  Injectable. 2000 milliGRAM(s) IV Push every 8 hours  vancomycin  IVPB 1250 milliGRAM(s) IV Intermittent every 12 hours    Other medications:  acetaminophen     Tablet .. 975 milliGRAM(s) Oral every 8 hours  celecoxib 200 milliGRAM(s) Oral every 12 hours  enoxaparin Injectable 30 milliGRAM(s) SubCutaneous every 12 hours  pantoprazole    Tablet 40 milliGRAM(s) Oral before breakfast  polyethylene glycol 3350 17 Gram(s) Oral daily  senna 2 Tablet(s) Oral at bedtime  sodium chloride 0.9%. 1000 milliLiter(s) IV Continuous <Continuous>                            9.3    12.58 )-----------( 341      ( 11 Aug 2024 05:27 )             28.4     08-11    142  |  108  |  11.0  ----------------------------<  115<H>  4.4   |  23.0  |  0.49<L>    Ca    8.3<L>      11 Aug 2024 05:27      RECENT CULTURES:  08-10 @ 20:32 Knee R Knee Synovial Fl #3     No growth    Few polymorphonuclear leukocytes per low power field  No organisms seen per oil power field      08-10 @ 20:31 Knee R Knee Synovial Fl #2     No growth    Few polymorphonuclear leukocytes per low power field  No organisms seen per oil power field      08-10 @ 20:30 Knee R Knee Synovial Fl #1     No growth    Few polymorphonuclear leukocytes per low power field  No organisms seen per oil power field    WBC Count: 12.58 K/uL (08-11-24 @ 05:27)  WBC Count: 9.06 K/uL (08-09-24 @ 21:05)    Creatinine: 0.49 mg/dL (08-11-24 @ 05:27)  Creatinine: 0.51 mg/dL (08-09-24 @ 21:05)    Vancomycin Level, Trough: 10.5 ug/mL (08-11-24 @ 22:40)    ________________________________________________________________  CARDIOLOGY   ________________________________________________________________  RADIOLOGY  < from: Xray Knee 1 or 2 Views, Right (08.10.24 @ 15:51) >    INTERPRETATION:  Postop right knee.    2 views right knee.    IMPRESSION: Right TKR with intact hardware satisfactory alignment. No   fractures. Postoperative soft tissue changes. Antibiotic beads and   surgical staples noted.    < end of copied text >

## 2024-08-12 NOTE — PROGRESS NOTE ADULT - ASSESSMENT
57 yo F with no PMHx but had Right total knee arthroplasty May 23, 2024 admitted for right knee septic arthritis.     Right knee septic arthritis   s/p Right knee PJI DAIR with antibiotic bead placement. POD#2   OR cultures pending.    ID  following     On Ancef and vanco broad spectrum pending cultures.      Lovenox 30mg BID.    As per Ortho, plan to  return to OR for second DAIR and bead removal in 1 week    Constipation  -Opiate induced. Add miralax. Continue senns. Dulcolax suppository as needed    Leukocytosis  - Probably reactive, repeat in am    Post op Anemia  - Asymptomatic, repeat CBC in am    dvt ppx: per ortho 57 yo F with no PMHx but had Right total knee arthroplasty May 23, 2024 admitted for right knee septic arthritis.     Plan:     Right knee septic arthritis   s/p Right knee PJI DAIR with antibiotic bead placement. POD#2   OR cultures pending.    ID  following     On Ancef and vanco broad spectrum pending cultures.      Lovenox 30mg BID.    As per Ortho, plan to  return to OR for second DAIR and bead removal in 1 week    Constipation  -Opiate induced. Add miralax. Continue senns. Dulcolax suppository as needed    Leukocytosis  - Probably reactive, repeat in am    Post op Anemia  - Asymptomatic, repeat CBC in am    dvt ppx: per ortho

## 2024-08-12 NOTE — PROGRESS NOTE ADULT - SUBJECTIVE AND OBJECTIVE BOX
CC: Right knee septic joint     INTERVAL HPI/OVERNIGHT EVENTS: Sitting up in chair. Pain controlled on current RX. Plan to return to OR in one week. Reports no BM since prior to admission. Voiding without difficulty.     Vital Signs Last 24 Hrs  T(C): 37.1 (12 Aug 2024 09:03), Max: 37.1 (12 Aug 2024 09:03)  T(F): 98.8 (12 Aug 2024 09:03), Max: 98.8 (12 Aug 2024 09:03)  HR: 83 (12 Aug 2024 09:03) (79 - 84)  BP: 144/79 (12 Aug 2024 09:03) (112/68 - 144/79)  BP(mean): --  RR: 18 (12 Aug 2024 09:03) (18 - 19)  SpO2: 96% (12 Aug 2024 09:03) (93% - 96%)    Parameters below as of 12 Aug 2024 09:03  Patient On (Oxygen Delivery Method): room air    ROS:  Constitutional, Eyes, ENT, Cardiovascular, Respiratory, Gastrointestinal, Genitourinary, Musculoskeletal, Integumentary, Neurological, Psychiatric, Endocrine, Heme/Lymph, and Allergic/Immunologic review of systems are otherwise negative except as noted in the HPI    PE:    GENERAL: NAD, well-groomed, well-developed  HEAD:  Atraumatic, Normocephalic  EYES: EOMI, PERRLA, conjunctiva and sclera clear  NECK: Supple, No JVD, Normal thyroid  NERVOUS SYSTEM:  Alert & Oriented X3, Good concentration; Motor Strength 5/5 B/L upper and lower extremities; DTRs 2+ intact and symmetric  CHEST/LUNG: CTA  b/l,  no rales, rhonchi, wheezing, or rubs  HEART: Regular rate and rhythm; No murmurs, rubs, or gallops  ABDOMEN: Soft, Nontender, Nondistended; Bowel sounds present  EXTREMITIES: right leg brace, dressing c/d/i  LYMPH: No lymphadenopathy noted  SKIN: No rashes or lesions    I&O's Detail                                9.3    12.58 )-----------( 341      ( 11 Aug 2024 05:27 )             28.4     11 Aug 2024 05:27    142    |  108    |  11.0   ----------------------------<  115    4.4     |  23.0   |  0.49     Ca    8.3        11 Aug 2024 05:27        CAPILLARY BLOOD GLUCOSE          Urinalysis Basic - ( 11 Aug 2024 05:27 )    Color: x / Appearance: x / SG: x / pH: x  Gluc: 115 mg/dL / Ketone: x  / Bili: x / Urobili: x   Blood: x / Protein: x / Nitrite: x   Leuk Esterase: x / RBC: x / WBC x   Sq Epi: x / Non Sq Epi: x / Bacteria: x        MEDICATIONS  (STANDING):  acetaminophen     Tablet .. 975 milliGRAM(s) Oral every 8 hours  ceFAZolin  Injectable. 2000 milliGRAM(s) IV Push every 8 hours  celecoxib 200 milliGRAM(s) Oral every 12 hours  enoxaparin Injectable 30 milliGRAM(s) SubCutaneous every 12 hours  pantoprazole    Tablet 40 milliGRAM(s) Oral before breakfast  polyethylene glycol 3350 17 Gram(s) Oral daily  senna 2 Tablet(s) Oral at bedtime  sodium chloride 0.9%. 1000 milliLiter(s) (125 mL/Hr) IV Continuous <Continuous>  vancomycin  IVPB 1250 milliGRAM(s) IV Intermittent every 12 hours    MEDICATIONS  (PRN):  aluminum hydroxide/magnesium hydroxide/simethicone Suspension 30 milliLiter(s) Oral four times a day PRN Indigestion  bisacodyl Suppository 10 milliGRAM(s) Rectal once PRN Constipation  cyclobenzaprine 5 milliGRAM(s) Oral three times a day PRN Muscle Spasm  HYDROmorphone   Tablet 4 milliGRAM(s) Oral every 3 hours PRN Severe Pain (7 - 10)  HYDROmorphone  Injectable 0.5 milliGRAM(s) IV Push every 3 hours PRN Breakthrough pain  melatonin 3 milliGRAM(s) Oral at bedtime PRN Insomnia  ondansetron Injectable 4 milliGRAM(s) IV Push every 6 hours PRN Nausea and/or Vomiting  oxyCODONE    IR 5 milliGRAM(s) Oral every 3 hours PRN Mild Pain (1 - 3)  oxyCODONE    IR 10 milliGRAM(s) Oral every 3 hours PRN Moderate Pain (4 - 6)      RADIOLOGY & ADDITIONAL TESTS:   CC: Right knee septic joint     INTERVAL HPI/OVERNIGHT EVENTS: Sitting up in chair. Pain controlled on current RX. Plan to return to OR in one week. Reports no BM since prior to admission. Voiding without difficulty.     Vital Signs Last 24 Hrs  T(C): 37.1 (12 Aug 2024 09:03), Max: 37.1 (12 Aug 2024 09:03)  T(F): 98.8 (12 Aug 2024 09:03), Max: 98.8 (12 Aug 2024 09:03)  HR: 83 (12 Aug 2024 09:03) (79 - 84)  BP: 144/79 (12 Aug 2024 09:03) (112/68 - 144/79)  BP(mean): --  RR: 18 (12 Aug 2024 09:03) (18 - 19)  SpO2: 96% (12 Aug 2024 09:03) (93% - 96%)    Parameters below as of 12 Aug 2024 09:03  Patient On (Oxygen Delivery Method): room air      PE:    GENERAL: NAD, well-groomed, well-developed  HEAD:  Atraumatic, Normocephalic  NECK: Supple, No JVD, Normal thyroid  NERVOUS SYSTEM:  Alert & Oriented X3   CHEST/LUNG: CTA  b/l,  no rale  HEART: Regular rate and rhythm; No murmurs   ABDOMEN: Soft, Nontender, Nondistended; Bowel sounds present  EXTREMITIES: right leg brace, dressing c/d/i  LYMPH: No lymphadenopathy noted  SKIN: No rashes or lesions    I&O's Detail                                9.3    12.58 )-----------( 341      ( 11 Aug 2024 05:27 )             28.4     11 Aug 2024 05:27    142    |  108    |  11.0   ----------------------------<  115    4.4     |  23.0   |  0.49     Ca    8.3        11 Aug 2024 05:27              MEDICATIONS  (STANDING):  acetaminophen     Tablet .. 975 milliGRAM(s) Oral every 8 hours  ceFAZolin  Injectable. 2000 milliGRAM(s) IV Push every 8 hours  celecoxib 200 milliGRAM(s) Oral every 12 hours  enoxaparin Injectable 30 milliGRAM(s) SubCutaneous every 12 hours  pantoprazole    Tablet 40 milliGRAM(s) Oral before breakfast  polyethylene glycol 3350 17 Gram(s) Oral daily  senna 2 Tablet(s) Oral at bedtime  sodium chloride 0.9%. 1000 milliLiter(s) (125 mL/Hr) IV Continuous <Continuous>  vancomycin  IVPB 1250 milliGRAM(s) IV Intermittent every 12 hours    MEDICATIONS  (PRN):  aluminum hydroxide/magnesium hydroxide/simethicone Suspension 30 milliLiter(s) Oral four times a day PRN Indigestion  bisacodyl Suppository 10 milliGRAM(s) Rectal once PRN Constipation  cyclobenzaprine 5 milliGRAM(s) Oral three times a day PRN Muscle Spasm  HYDROmorphone   Tablet 4 milliGRAM(s) Oral every 3 hours PRN Severe Pain (7 - 10)  HYDROmorphone  Injectable 0.5 milliGRAM(s) IV Push every 3 hours PRN Breakthrough pain  melatonin 3 milliGRAM(s) Oral at bedtime PRN Insomnia  ondansetron Injectable 4 milliGRAM(s) IV Push every 6 hours PRN Nausea and/or Vomiting  oxyCODONE    IR 5 milliGRAM(s) Oral every 3 hours PRN Mild Pain (1 - 3)  oxyCODONE    IR 10 milliGRAM(s) Oral every 3 hours PRN Moderate Pain (4 - 6)      RADIOLOGY & ADDITIONAL TESTS:

## 2024-08-12 NOTE — PROGRESS NOTE ADULT - ASSESSMENT
58F s/p Right total knee arthroplasty on 5/23/2024 admitted for double DAIR for right knee PJI. Seen in the office by Dr. Baptiste on 8/8 >> patient c/o posterior knee swelling and pain. Underwent arthrocentesis (no gross purulence).     Additional information - not on antibiotics prior to admission. No wound dehiscence. No knee swelling/redness, fever or chills; symptoms presented as worsening severe posterior leg pain (like pulling of muscles) relived after arthrocentesis.     8/8 Synovial fluid cell count with RBC 84,000, nuc cell 30,798 (N 72%)  8/8 Synovial culture - Gram w/o organisms. Culture NEG    will assess if specimen still available for joint PCR.   s/p DAIR 1 with antibiotics bead placement on 8/10     Surgical cultures - Gram w/o organisms. Cultures ngtd     Unable to add Joint PCR to surgical specimens (sent in swab)  Tentatively going for DAIR 2 on Fri   No leukocytosis, normal differential   Afebrile     Continue vancomycin + cefazolin pending cultures   Monitor vancomycin through; dose adjustment per pharmacy protocol     Will follow     D/w ASP/clinical pharmacist  58F s/p Right total knee arthroplasty on 5/23/2024 admitted for double DAIR for right knee PJI. Seen in the office by Dr. Baptiste on 8/8 >> patient c/o posterior knee swelling and pain. Underwent arthrocentesis (no gross purulence).     Additional information - not on antibiotics prior to admission. No wound dehiscence. No knee swelling/redness, fever or chills; symptoms presented as worsening severe posterior leg pain (like pulling of muscles) relived after arthrocentesis.     8/8 Synovial fluid cell count with RBC 84,000, nuc cell 30,798 (N 72%)  8/8 Synovial culture - Gram w/o organisms. Culture NEG    Joint PCR added to 8/8 sample   s/p DAIR 1 with antibiotics bead placement on 8/10     Surgical cultures - Gram w/o organisms. Cultures ngtd     Unable to add Joint PCR to surgical specimens (sent in swab)  Tentatively going for DAIR 2 on Fri   No leukocytosis, normal differential   Afebrile     Continue vancomycin + cefazolin pending cultures   Monitor vancomycin through; dose adjustment per pharmacy protocol     Will follow     D/w ASP/clinical pharmacist, micro lab and team

## 2024-08-13 LAB
ANION GAP SERPL CALC-SCNC: 10 MMOL/L — SIGNIFICANT CHANGE UP (ref 5–17)
BASOPHILS # BLD AUTO: 0.03 K/UL — SIGNIFICANT CHANGE UP (ref 0–0.2)
BASOPHILS NFR BLD AUTO: 0.4 % — SIGNIFICANT CHANGE UP (ref 0–2)
BUN SERPL-MCNC: 14.4 MG/DL — SIGNIFICANT CHANGE UP (ref 8–20)
CALCIUM SERPL-MCNC: 9 MG/DL — SIGNIFICANT CHANGE UP (ref 8.4–10.5)
CHLORIDE SERPL-SCNC: 102 MMOL/L — SIGNIFICANT CHANGE UP (ref 96–108)
CO2 SERPL-SCNC: 26 MMOL/L — SIGNIFICANT CHANGE UP (ref 22–29)
CREAT SERPL-MCNC: 0.66 MG/DL — SIGNIFICANT CHANGE UP (ref 0.5–1.3)
EGFR: 102 ML/MIN/1.73M2 — SIGNIFICANT CHANGE UP
EOSINOPHIL # BLD AUTO: 0.65 K/UL — HIGH (ref 0–0.5)
EOSINOPHIL NFR BLD AUTO: 8.5 % — HIGH (ref 0–6)
GLUCOSE SERPL-MCNC: 98 MG/DL — SIGNIFICANT CHANGE UP (ref 70–99)
HCT VFR BLD CALC: 29 % — LOW (ref 34.5–45)
HGB BLD-MCNC: 9.2 G/DL — LOW (ref 11.5–15.5)
IMM GRANULOCYTES NFR BLD AUTO: 0.4 % — SIGNIFICANT CHANGE UP (ref 0–0.9)
LYMPHOCYTES # BLD AUTO: 2.4 K/UL — SIGNIFICANT CHANGE UP (ref 1–3.3)
LYMPHOCYTES # BLD AUTO: 31.2 % — SIGNIFICANT CHANGE UP (ref 13–44)
MAGNESIUM SERPL-MCNC: 1.8 MG/DL — SIGNIFICANT CHANGE UP (ref 1.6–2.6)
MCHC RBC-ENTMCNC: 27.3 PG — SIGNIFICANT CHANGE UP (ref 27–34)
MCHC RBC-ENTMCNC: 31.7 GM/DL — LOW (ref 32–36)
MCV RBC AUTO: 86.1 FL — SIGNIFICANT CHANGE UP (ref 80–100)
MONOCYTES # BLD AUTO: 0.81 K/UL — SIGNIFICANT CHANGE UP (ref 0–0.9)
MONOCYTES NFR BLD AUTO: 10.5 % — SIGNIFICANT CHANGE UP (ref 2–14)
NEUTROPHILS # BLD AUTO: 3.77 K/UL — SIGNIFICANT CHANGE UP (ref 1.8–7.4)
NEUTROPHILS NFR BLD AUTO: 49 % — SIGNIFICANT CHANGE UP (ref 43–77)
PHOSPHATE SERPL-MCNC: 3.8 MG/DL — SIGNIFICANT CHANGE UP (ref 2.4–4.7)
PLATELET # BLD AUTO: 315 K/UL — SIGNIFICANT CHANGE UP (ref 150–400)
POTASSIUM SERPL-MCNC: 4.2 MMOL/L — SIGNIFICANT CHANGE UP (ref 3.5–5.3)
POTASSIUM SERPL-SCNC: 4.2 MMOL/L — SIGNIFICANT CHANGE UP (ref 3.5–5.3)
RBC # BLD: 3.37 M/UL — LOW (ref 3.8–5.2)
RBC # FLD: 12.6 % — SIGNIFICANT CHANGE UP (ref 10.3–14.5)
SODIUM SERPL-SCNC: 138 MMOL/L — SIGNIFICANT CHANGE UP (ref 135–145)
WBC # BLD: 7.69 K/UL — SIGNIFICANT CHANGE UP (ref 3.8–10.5)
WBC # FLD AUTO: 7.69 K/UL — SIGNIFICANT CHANGE UP (ref 3.8–10.5)

## 2024-08-13 PROCEDURE — 99233 SBSQ HOSP IP/OBS HIGH 50: CPT

## 2024-08-13 RX ORDER — DAPTOMYCIN 500 MG/10ML
500 INJECTION, POWDER, LYOPHILIZED, FOR SOLUTION INTRAVENOUS EVERY 24 HOURS
Refills: 0 | Status: DISCONTINUED | OUTPATIENT
Start: 2024-08-13 | End: 2024-08-19

## 2024-08-13 RX ADMIN — Medication 40 MILLIGRAM(S): at 05:16

## 2024-08-13 RX ADMIN — CEFAZOLIN SODIUM 2000 MILLIGRAM(S): 2 INJECTION, SOLUTION INTRAVENOUS at 11:48

## 2024-08-13 RX ADMIN — CELECOXIB 200 MILLIGRAM(S): 400 CAPSULE ORAL at 05:15

## 2024-08-13 RX ADMIN — Medication 166.67 MILLIGRAM(S): at 11:47

## 2024-08-13 RX ADMIN — DAPTOMYCIN 120 MILLIGRAM(S): 500 INJECTION, POWDER, LYOPHILIZED, FOR SOLUTION INTRAVENOUS at 18:54

## 2024-08-13 RX ADMIN — CEFAZOLIN SODIUM 2000 MILLIGRAM(S): 2 INJECTION, SOLUTION INTRAVENOUS at 03:29

## 2024-08-13 RX ADMIN — ACETAMINOPHEN 975 MILLIGRAM(S): 325 TABLET ORAL at 05:15

## 2024-08-13 RX ADMIN — Medication 2000 MILLIGRAM(S): at 18:51

## 2024-08-13 RX ADMIN — CELECOXIB 200 MILLIGRAM(S): 400 CAPSULE ORAL at 19:00

## 2024-08-13 RX ADMIN — CELECOXIB 200 MILLIGRAM(S): 400 CAPSULE ORAL at 05:19

## 2024-08-13 RX ADMIN — ACETAMINOPHEN 975 MILLIGRAM(S): 325 TABLET ORAL at 05:19

## 2024-08-13 RX ADMIN — ENOXAPARIN SODIUM 30 MILLIGRAM(S): 100 INJECTION SUBCUTANEOUS at 18:54

## 2024-08-13 RX ADMIN — CELECOXIB 200 MILLIGRAM(S): 400 CAPSULE ORAL at 18:54

## 2024-08-13 RX ADMIN — ENOXAPARIN SODIUM 30 MILLIGRAM(S): 100 INJECTION SUBCUTANEOUS at 05:16

## 2024-08-13 RX ADMIN — Medication 3 MILLIGRAM(S): at 21:09

## 2024-08-13 NOTE — PROGRESS NOTE ADULT - ASSESSMENT
58F s/p Right total knee arthroplasty on 5/23/2024 admitted for double DAIR for right knee PJI. Seen in the office by Dr. aBptiste on 8/8 >> patient c/o posterior knee swelling and pain. Underwent arthrocentesis (no gross purulence).     Additional information - not on antibiotics prior to admission. No wound dehiscence. No knee swelling/redness, fever or chills; symptoms presented as worsening severe posterior leg pain (like pulling of muscles) relived after arthrocentesis.     8/8 Synovial fluid cell count with RBC 84,000, nuc cell 30,798 (N 72%)  8/8 Synovial culture - Gram w/o organisms. Culture NEG    Joint PCR NEG    s/p DAIR 1 with antibiotics bead placement on 8/10     Surgical cultures - Gram w/o organisms. Cultures ngtd     Unable to add Joint PCR to surgical specimens (sent in swab)  Tentatively going for DAIR 2 on Mon  No leukocytosis, normal differential   Afebrile     Poor IV access  On vancomycin + cefazolin   will switch empirically  to daptomycin + ceftriaxone in the absence of micro data   Baseline CPK ordered     d/w ASP clinical pharmacist, RN and Ortho

## 2024-08-13 NOTE — PROGRESS NOTE ADULT - SUBJECTIVE AND OBJECTIVE BOX
[facility]  INFECTIOUS DISEASES at [place] and [place]  ===============================================================                  Faraz Calhoun MD               Diplomates American Board of Internal Medicine & Infectious Diseases                * [place] Office - Appt - Tel  [phone] Fax [phone]                * [place] Office - Appt - Tel [phone] Fax [phone]                                  Hospital Consult line:  [phone]  ==============================================================    PHUONG DIXONELLE [id number]    Follow up: right knee PJI     Lost IV access  afebrile   hemodynamically stable     I have personally reviewed the labs and data; pertinent labs and data are listed in this note; please see below.     _______________________________________________________________  REVIEW OF SYSTEMS  Feeling well. No noticeable side effects from antibiotics. Pain controlled. No fever or chills.   ________________________________________________________________  Allergies:  penicillin (Hives; Pruritus)    ________________________________________________________________  PHYSICAL EXAM  GEN: in NAD, sitting in chair   HEENT: Anicteric sclerae. Moist mucous membranes. No mucosal lesions.   LUNGS: eupneic. CTA B/L.  HEART: RRR, no m/r/g  : No Wilkinson catheter  MSK: right leg in immobilizer, bandages in place    NEUROLOGIC: Grossly no focal deficits   PSYCHIATRIC: Appropriate affect and mood  SKIN: No rash, wounds or jaundice  LINES: PIV   ________________________________________________________________  Vitals:  T(F): 98.6 (13 Aug 2024 12:00), Max: 98.6 (13 Aug 2024 12:00)  HR: 78 (13 Aug 2024 12:00)  BP: 147/83 (13 Aug 2024 12:00)  RR: 18 (13 Aug 2024 12:00)  SpO2: 98% (13 Aug 2024 12:00) (93% - 98%)  temp max in last 48H T(F): , Max: 98.8 (08-12-24 @ 09:03)    Current Antibiotics:  ceFAZolin  Injectable. 2000 milliGRAM(s) IV Push every 8 hours  vancomycin  IVPB 1250 milliGRAM(s) IV Intermittent every 12 hours    Other medications:  acetaminophen     Tablet .. 975 milliGRAM(s) Oral every 8 hours  celecoxib 200 milliGRAM(s) Oral every 12 hours  enoxaparin Injectable 30 milliGRAM(s) SubCutaneous every 12 hours  pantoprazole    Tablet 40 milliGRAM(s) Oral before breakfast  polyethylene glycol 3350 17 Gram(s) Oral daily  senna 2 Tablet(s) Oral at bedtime  sodium chloride 0.9%. 1000 milliLiter(s) IV Continuous <Continuous>                            9.2    7.69  )-----------( 315      ( 13 Aug 2024 05:25 )             29.0     08-13    138  |  102  |  14.4  ----------------------------<  98  4.2   |  26.0  |  0.66    Ca    9.0      13 Aug 2024 05:25  Phos  3.8     08-13  Mg     1.8     08-13      RECENT CULTURES:  08-10 @ 20:32 Knee R Knee Synovial Fl #3     No growth    Few polymorphonuclear leukocytes per low power field  No organisms seen per oil power field      08-10 @ 20:31 Knee R Knee Synovial Fl #2     No growth    Few polymorphonuclear leukocytes per low power field  No organisms seen per oil power field      08-10 @ 20:30 Knee R Knee Synovial Fl #1     No growth    Few polymorphonuclear leukocytes per low power field  No organisms seen per oil power field    WBC Count: 7.69 K/uL (08-13-24 @ 05:25)  WBC Count: 12.58 K/uL (08-11-24 @ 05:27)  WBC Count: 9.06 K/uL (08-09-24 @ 21:05)    Creatinine: 0.66 mg/dL (08-13-24 @ 05:25)  Creatinine: 0.49 mg/dL (08-11-24 @ 05:27)  Creatinine: 0.51 mg/dL (08-09-24 @ 21:05)    Vancomycin Level, Trough: 10.5 ug/mL (08-11-24 @ 22:40)    ________________________________________________________________  CARDIOLOGY   ________________________________________________________________  RADIOLOGY  < from: Xray Knee 1 or 2 Views, Right (08.10.24 @ 15:51) >  IMPRESSION: Right TKR with intact hardware satisfactory alignment. No   fractures. Postoperative soft tissue changes. Antibiotic beads and   surgical staples noted.    < end of copied text >

## 2024-08-13 NOTE — PROGRESS NOTE ADULT - SUBJECTIVE AND OBJECTIVE BOX
VIRI DIXON  [id number]    History: 58y Female is status post right total knee washout, poly exchange and antibiotic bead placement POD #3. Patient is doing well and is comfortable. The patient's pain is controlled using the prescribed pain medications. The patient will be participating in physical therapy. Denies CP, SOB, dizziness, HA, fever/chills, numbness or tingling. No new complaints.    ICU Vital Signs Last 24 Hrs  T(C): 36.6 (13 Aug 2024 08:59), Max: 36.8 (12 Aug 2024 12:20)  T(F): 97.8 (13 Aug 2024 08:59), Max: 98.3 (12 Aug 2024 12:20)  HR: 74 (13 Aug 2024 08:59) (74 - 88)  BP: 123/79 (13 Aug 2024 08:59) (121/74 - 134/81)  BP(mean): --  ABP: --  ABP(mean): --  RR: 16 (13 Aug 2024 08:59) (16 - 18)  SpO2: 93% (13 Aug 2024 08:59) (93% - 97%)    O2 Parameters below as of 13 Aug 2024 08:59  Patient On (Oxygen Delivery Method): room air                                     9.2    7.69  )-----------( 315      ( 13 Aug 2024 05:25 )             29.0   08-13    138  |  102  |  14.4  ----------------------------<  98  4.2   |  26.0  |  0.66    Ca    9.0      13 Aug 2024 05:25  Phos  3.8     08-13  Mg     1.8     08-13          General: Alert, awake, NAD  Physical exam: KI in place to RLE. KI opened, Ace wrap opened- The right knee prevena  dressing is clean, dry and intact-tube patent, under suction. Ace wrap with No drainage, discharge, erythema, blistering or ecchymosis noted.   The calf is supple nontender b/l.   SILT. +AT/GC/EHL/FHL.   2+ DP pulse. BCR. No cyanosis.    KI and ace wrap placed after exam.     Culture - Joint (08.10.24 @ 20:32)    Gram Stain:   Few polymorphonuclear leukocytes per low power field  No organisms seen per oil power field   Specimen Source: Knee R Knee Synovial Fl #3   Culture Results:   No growth    Culture - Joint (08.10.24 @ 20:31)    Gram Stain:   Few polymorphonuclear leukocytes per low power field  No organisms seen per oil power field   Specimen Source: Knee R Knee Synovial Fl #2   Culture Results:   No growth    Culture - Joint (08.10.24 @ 20:30)    Gram Stain:   Few polymorphonuclear leukocytes per low power field  No organisms seen per oil power field   Specimen Source: Knee R Knee Synovial Fl #1   Culture Results:   No growth          Plan:   - DVT prophylaxis as prescribed, including use of compression devices and ankle pumps-lovenox 30mg bid  - Continue physical therapy  - Weight bearing status of surgical extremity: WBAT in KI AAT  - Incentive spirometry encouraged  - Pain control as clinically indicated  - f/u OR cx  - continue IV abx  - ID consulted and seeing patient   - medical management  - tentative plan for OR 8/19/24 Monday with Dr. Baptiste

## 2024-08-14 LAB — CK SERPL-CCNC: 37 U/L — SIGNIFICANT CHANGE UP (ref 25–170)

## 2024-08-14 RX ADMIN — Medication 2000 MILLIGRAM(S): at 16:56

## 2024-08-14 RX ADMIN — CELECOXIB 200 MILLIGRAM(S): 400 CAPSULE ORAL at 05:11

## 2024-08-14 RX ADMIN — ENOXAPARIN SODIUM 30 MILLIGRAM(S): 100 INJECTION SUBCUTANEOUS at 05:11

## 2024-08-14 RX ADMIN — CELECOXIB 200 MILLIGRAM(S): 400 CAPSULE ORAL at 17:30

## 2024-08-14 RX ADMIN — CELECOXIB 200 MILLIGRAM(S): 400 CAPSULE ORAL at 05:43

## 2024-08-14 RX ADMIN — ENOXAPARIN SODIUM 30 MILLIGRAM(S): 100 INJECTION SUBCUTANEOUS at 16:56

## 2024-08-14 RX ADMIN — Medication 3 MILLIGRAM(S): at 22:00

## 2024-08-14 RX ADMIN — DAPTOMYCIN 120 MILLIGRAM(S): 500 INJECTION, POWDER, LYOPHILIZED, FOR SOLUTION INTRAVENOUS at 16:50

## 2024-08-14 RX ADMIN — Medication 40 MILLIGRAM(S): at 05:11

## 2024-08-14 RX ADMIN — CELECOXIB 200 MILLIGRAM(S): 400 CAPSULE ORAL at 16:54

## 2024-08-14 NOTE — PROGRESS NOTE ADULT - SUBJECTIVE AND OBJECTIVE BOX
VIRI DIXON    [id number]    History: 58y Female is status post right total knee irrigation  and debridement / poly exchange and antibiotic bead placement on 8/10, POD#4. Knee immobilizer in place.  Patient is doing well and is comfortable. The patient's pain is controlled using the prescribed pain medications. The patient is participating in physical therapy, WBAT in Knee immobilizer.  Denies nausea, vomiting, chest pain, shortness of breath, abdominal pain or fever. No new orthopedic complaints.    Culture Results:   No growth (08.10.24 @ 20:32)                              9.2    7.69  )-----------( 315      ( 13 Aug 2024 05:25 )             29.0     08-13    138  |  102  |  14.4  ----------------------------<  98  4.2   |  26.0  |  0.66    Ca    9.0      13 Aug 2024 05:25  Phos  3.8     08-13  Mg     1.8     08-13        MEDICATIONS  (STANDING):  acetaminophen     Tablet .. 975 milliGRAM(s) Oral every 8 hours  cefTRIAXone Injectable. 2000 milliGRAM(s) IV Push every 24 hours  celecoxib 200 milliGRAM(s) Oral every 12 hours  DAPTOmycin IVPB 500 milliGRAM(s) IV Intermittent every 24 hours  enoxaparin Injectable 30 milliGRAM(s) SubCutaneous every 12 hours  pantoprazole    Tablet 40 milliGRAM(s) Oral before breakfast  polyethylene glycol 3350 17 Gram(s) Oral daily  senna 2 Tablet(s) Oral at bedtime  sodium chloride 0.9%. 1000 milliLiter(s) (125 mL/Hr) IV Continuous <Continuous>    MEDICATIONS  (PRN):  aluminum hydroxide/magnesium hydroxide/simethicone Suspension 30 milliLiter(s) Oral four times a day PRN Indigestion  bisacodyl Suppository 10 milliGRAM(s) Rectal once PRN Constipation  cyclobenzaprine 5 milliGRAM(s) Oral three times a day PRN Muscle Spasm  HYDROmorphone   Tablet 4 milliGRAM(s) Oral every 3 hours PRN Severe Pain (7 - 10)  HYDROmorphone  Injectable 0.5 milliGRAM(s) IV Push every 3 hours PRN Breakthrough pain  melatonin 3 milliGRAM(s) Oral at bedtime PRN Insomnia  ondansetron Injectable 4 milliGRAM(s) IV Push every 6 hours PRN Nausea and/or Vomiting  oxyCODONE    IR 5 milliGRAM(s) Oral every 3 hours PRN Mild Pain (1 - 3)  oxyCODONE    IR 10 milliGRAM(s) Oral every 3 hours PRN Moderate Pain (4 - 6)      Physical exam: The right knee previna dressing is clean, dry and suction remains intact. No drainage or discharge in tubing or cannister.  Lobo remain clean dry and intact. New dressings placed with ace bandage.  No erythema is noted. No blistering. No ecchymosis. The calf is supple nontender. No calf tenderness. Sensation to light touch is grossly intact distally. Motor function distally is 5/5. Extensor hallucis longus and flexor hallucis longus are intact. No foot drop. 2+ dorsalis pedis pulse. Capillary refill is less than 2 seconds. No cyanosis.    Primary Orthopedic Assessment:  • s/p RIGHT total knee irrigation and debridement / Antibiotic bead placement POD#4.     Secondary  Orthopedic Assessment(s):   •     Secondary  Medical Assessment(s):   •     Plan:   • DVT prophylaxis as prescribed, including use of compression devices and ankle pumps  • Continue physical therapy  • Weightbearing as tolerated of the right lower extremity with assistance of a walker and knee immobilizer  • Incentive spirometry encouraged  • Pain control as clinically indicated

## 2024-08-15 PROCEDURE — 99232 SBSQ HOSP IP/OBS MODERATE 35: CPT

## 2024-08-15 RX ADMIN — Medication 2000 MILLIGRAM(S): at 18:21

## 2024-08-15 RX ADMIN — ENOXAPARIN SODIUM 30 MILLIGRAM(S): 100 INJECTION SUBCUTANEOUS at 18:21

## 2024-08-15 RX ADMIN — Medication 40 MILLIGRAM(S): at 05:07

## 2024-08-15 RX ADMIN — CELECOXIB 200 MILLIGRAM(S): 400 CAPSULE ORAL at 18:00

## 2024-08-15 RX ADMIN — ENOXAPARIN SODIUM 30 MILLIGRAM(S): 100 INJECTION SUBCUTANEOUS at 05:08

## 2024-08-15 RX ADMIN — DAPTOMYCIN 120 MILLIGRAM(S): 500 INJECTION, POWDER, LYOPHILIZED, FOR SOLUTION INTRAVENOUS at 18:30

## 2024-08-15 RX ADMIN — Medication 3 MILLIGRAM(S): at 22:03

## 2024-08-15 RX ADMIN — CELECOXIB 200 MILLIGRAM(S): 400 CAPSULE ORAL at 05:11

## 2024-08-15 RX ADMIN — ACETAMINOPHEN 975 MILLIGRAM(S): 325 TABLET ORAL at 13:41

## 2024-08-15 RX ADMIN — CELECOXIB 200 MILLIGRAM(S): 400 CAPSULE ORAL at 18:21

## 2024-08-15 RX ADMIN — ACETAMINOPHEN 975 MILLIGRAM(S): 325 TABLET ORAL at 13:11

## 2024-08-15 RX ADMIN — CELECOXIB 200 MILLIGRAM(S): 400 CAPSULE ORAL at 05:07

## 2024-08-15 NOTE — PROGRESS NOTE ADULT - SUBJECTIVE AND OBJECTIVE BOX
Patient seen and examined at bedside. comfortable in bed, pain controlled. Denies fever/chills, SOB/chest pain, abdominal pain, numbness/tingling. no complaints.    Vital Signs Last 24 Hrs  T(C): 36.7 (15 Aug 2024 05:00), Max: 36.9 (14 Aug 2024 09:49)  T(F): 98 (15 Aug 2024 05:00), Max: 98.4 (14 Aug 2024 09:49)  HR: 75 (15 Aug 2024 05:00) (75 - 86)  BP: 130/79 (15 Aug 2024 05:00) (119/76 - 137/81)  BP(mean): --  RR: 18 (15 Aug 2024 05:00) (18 - 18)  SpO2: 95% (15 Aug 2024 05:00) (94% - 97%)    Parameters below as of 15 Aug 2024 05:00  Patient On (Oxygen Delivery Method): room air    General: NAD  RLE: KI noted, in place. Ace bandage dressing C/D/I. SILT. + dorsi/plantarflexion. DP 2+. calf soft NT B/L.    A/P: 58 y.o F s/p right knee washout, poly exchange with abx peads POD #5  - WBAT in KI AAT  - DVTP  - abx per ID  - return to OR next week Patient seen and examined at bedside. comfortable in bed, pain controlled. Denies fever/chills, SOB/chest pain, abdominal pain, numbness/tingling. no complaints.    Vital Signs Last 24 Hrs  T(C): 36.7 (15 Aug 2024 05:00), Max: 36.9 (14 Aug 2024 09:49)  T(F): 98 (15 Aug 2024 05:00), Max: 98.4 (14 Aug 2024 09:49)  HR: 75 (15 Aug 2024 05:00) (75 - 86)  BP: 130/79 (15 Aug 2024 05:00) (119/76 - 137/81)  BP(mean): --  RR: 18 (15 Aug 2024 05:00) (18 - 18)  SpO2: 95% (15 Aug 2024 05:00) (94% - 97%)    Parameters below as of 15 Aug 2024 05:00  Patient On (Oxygen Delivery Method): room air    General: NAD  RLE: KI noted, in place. Ace bandage dressing C/D/I. SILT. + dorsi/plantarflexion. DP 2+. calf soft NT B/L.    Culture - Joint (08.10.24 @ 20:32)    Gram Stain:   Few polymorphonuclear leukocytes per low power field  No organisms seen per oil power field   Specimen Source: Knee R Knee Synovial Fl #3   Culture Results:   No growth    Culture - Joint (08.10.24 @ 20:31)    Gram Stain:   Few polymorphonuclear leukocytes per low power field  No organisms seen per oil power field   Specimen Source: Knee R Knee Synovial Fl #2   Culture Results:   No growth    Culture - Joint (08.10.24 @ 20:30)    Gram Stain:   Few polymorphonuclear leukocytes per low power field  No organisms seen per oil power field   Specimen Source: Knee R Knee Synovial Fl #1   Culture Results:   No growth            A/P: 58 y.o F s/p right knee washout, poly exchange with abx peads POD #5  - WBAT in KI AAT  - DVTP  - abx per ID  - return to OR next week

## 2024-08-15 NOTE — PROGRESS NOTE ADULT - SUBJECTIVE AND OBJECTIVE BOX
[facility]                                                INFECTIOUS DISEASES  =======================================================                   Garry Saini#   Faraz Schaffer MD#    Gosia Frey MD*                           Zahira Foster MD*   Merry Calhoun MD*   Roney Landry *           Diplomates American Board of Internal Medicine & Infectious Diseases                  # [place] Office - Appt - Tel  [phone] Fax [phone]                * [place] Office - Appt - Tel [phone] Fax [phone]                                  Hospital Consult line:  [phone]  =======================================================      [facility]-[id number]  VIRI GARCIADANG   follow up for: PJI  c/o feeling "off" this am, otherwise well  no fever  patient seen and examined.       I have personally reviewed the labs and data; pertinent labs and data are listed in this note; please see below.   ===================================================  REVIEW OF SYSTEMS:  CONSTITUTIONAL:  No Fever or chills  HEENT:  No diplopia or blurred vision.  No earache, sore throat or runny nose.  CARDIOVASCULAR:  No pressure, squeezing, strangling, tightness, heaviness or aching about the chest, neck, axilla or epigastrium.  RESPIRATORY:  No cough, shortness of breath  GASTROINTESTINAL:  No nausea, vomiting or diarrhea.  GENITOURINARY:  No dysuria, frequency or urgency. No Blood in urine  MUSCULOSKELETAL:  no joint aches, no muscle pain  SKIN:  No change in skin, hair or nails.  NEUROLOGIC:  No Headaches, seizures or weakness.  PSYCHIATRIC:  No disorder of thought or mood.  ENDOCRINE:  No heat or cold intolerance  HEMATOLOGICAL:  No easy bruising or bleeding.    =======================================================  Allergies    penicillin (Hives; Pruritus)    Intolerances    Antibiotics:  cefTRIAXone Injectable. 2000 milliGRAM(s) IV Push every 24 hours  DAPTOmycin IVPB 500 milliGRAM(s) IV Intermittent every 24 hours    Other medications:  acetaminophen     Tablet .. 975 milliGRAM(s) Oral every 8 hours  celecoxib 200 milliGRAM(s) Oral every 12 hours  enoxaparin Injectable 30 milliGRAM(s) SubCutaneous every 12 hours  pantoprazole    Tablet 40 milliGRAM(s) Oral before breakfast  polyethylene glycol 3350 17 Gram(s) Oral daily  senna 2 Tablet(s) Oral at bedtime  sodium chloride 0.9%. 1000 milliLiter(s) IV Continuous <Continuous>    ======================================================  Physical Exam:  ============  T(F): 97.8 (15 Aug 2024 09:54), Max: 98.1 (14 Aug 2024 16:04)  HR: 81 (15 Aug 2024 09:54)  BP: 116/65 (15 Aug 2024 09:54)  RR: 18 (15 Aug 2024 09:54)  SpO2: 93% (15 Aug 2024 09:54) (93% - 97%)  temp max in last 48H T(F): , Max: 98.6 (08-13-24 @ 12:00)    General:  No acute distress.  Eye: no conjunctival pallor, no scleral icterus    Respiratory: Lungs are clear to auscultation, Respirations are non-labored.  Cardiovascular: Normal rate, Regular rhythm,  s1+s2  Gastrointestinal: Soft, Non-tender, Non-distended, Normal bowel sounds.    Musculoskeletal: RLE wrap and brace  Integumentary: No rash.  Neurologic: Alert, Oriented, No focal deficits  Psychiatric: Appropriate mood & affect.  =======================================================  Labs:            Culture - Joint (collected 08-10-24 @ 20:32)  Source: Knee R Knee Synovial Fl #3  Gram Stain (08-11-24 @ 13:56):    Few polymorphonuclear leukocytes per low power field    No organisms seen per oil power field  Preliminary Report (08-12-24 @ 09:57):    No growth    Culture - Joint (collected 08-10-24 @ 20:31)  Source: Knee R Knee Synovial Fl #2  Gram Stain (08-11-24 @ 13:56):    Few polymorphonuclear leukocytes per low power field    No organisms seen per oil power field  Preliminary Report (08-12-24 @ 09:58):    No growth    Culture - Joint (collected 08-10-24 @ 20:30)  Source: Knee R Knee Synovial Fl #1  Gram Stain (08-11-24 @ 13:56):    Few polymorphonuclear leukocytes per low power field    No organisms seen per oil power field  Preliminary Report (08-12-24 @ 10:11):    No growth

## 2024-08-15 NOTE — PROGRESS NOTE ADULT - ASSESSMENT
58F s/p Right total knee arthroplasty on 5/23/2024 admitted for double DAIR for right knee PJI. Seen in the office by Dr. Baptiste on 8/8 >> patient c/o posterior knee swelling and pain. Underwent arthrocentesis (no gross purulence).     Additional information - not on antibiotics prior to admission. No wound dehiscence. No knee swelling/redness, fever or chills; symptoms presented as worsening severe posterior leg pain (like pulling of muscles) relived after arthrocentesis.     8/8 Synovial fluid cell count with RBC 84,000, nuc cell 30,798 (N 72%)  8/8 Synovial culture - Gram w/o organisms. Culture NEG    Joint PCR NEG    s/p DAIR 1 with antibiotics bead placement on 8/10     Surgical cultures - Gram w/o organisms. Cultures ngtd     Unable to add Joint PCR to surgical specimens (sent in swab)    Tentatively going for DAIR 2 on Mon  No leukocytosis, normal differential   Afebrile     Poor IV access  On  daptomycin + ceftriaxone in the absence of micro data   Baseline CPK 8/14=37    will f/u

## 2024-08-16 RX ADMIN — CELECOXIB 200 MILLIGRAM(S): 400 CAPSULE ORAL at 06:09

## 2024-08-16 RX ADMIN — DAPTOMYCIN 120 MILLIGRAM(S): 500 INJECTION, POWDER, LYOPHILIZED, FOR SOLUTION INTRAVENOUS at 18:27

## 2024-08-16 RX ADMIN — ENOXAPARIN SODIUM 30 MILLIGRAM(S): 100 INJECTION SUBCUTANEOUS at 05:34

## 2024-08-16 RX ADMIN — CELECOXIB 200 MILLIGRAM(S): 400 CAPSULE ORAL at 05:34

## 2024-08-16 RX ADMIN — Medication 2000 MILLIGRAM(S): at 18:23

## 2024-08-16 RX ADMIN — CELECOXIB 200 MILLIGRAM(S): 400 CAPSULE ORAL at 18:22

## 2024-08-16 RX ADMIN — ENOXAPARIN SODIUM 30 MILLIGRAM(S): 100 INJECTION SUBCUTANEOUS at 18:23

## 2024-08-16 RX ADMIN — Medication 40 MILLIGRAM(S): at 05:34

## 2024-08-16 RX ADMIN — Medication 3 MILLIGRAM(S): at 21:42

## 2024-08-16 NOTE — PROGRESS NOTE ADULT - SUBJECTIVE AND OBJECTIVE BOX
· Subjective and Objective:   pt s/p right knee washout with poly exchange and abx beads pod #6  pt wearing knee immobilizer, c/o it is irritating her right ankle   comfortable in bed, pain controlled. Denies fever/chills, SOB/chest pain, abdominal pain, numbness/tingling.    Vital Signs Last 24 Hrs  T(C): 36.7 (16 Aug 2024 04:05), Max: 36.7 (15 Aug 2024 16:06)  T(F): 98 (16 Aug 2024 04:05), Max: 98 (15 Aug 2024 16:06)  HR: 72 (16 Aug 2024 04:05) (72 - 84)  BP: 121/75 (16 Aug 2024 04:05) (106/77 - 121/75)  BP(mean): --  RR: 18 (16 Aug 2024 04:05) (18 - 18)  SpO2: 96% (16 Aug 2024 04:05) (93% - 96%)    Parameters below as of 16 Aug 2024 04:05  Patient On (Oxygen Delivery Method): room air        General: NAD  RLE: KI noted, in place. Ace bandage oin place mepilex c/d/i.  right ankle posterior aspect with small area erythema, no skin breakdown, no blisters, no area of drainage.  ABD placed at area of irritation for comfort. SILT. + dorsi/plantarflexion. DP 2+. calf soft NT B/L.      Right knee cultures x 3 NGTD             A/P: 58 y.o F s/p right knee washout, poly exchange with abx beads POD #6  - WBAT in KI AAT  - DVTP  - abx per ID  - return to OR next week

## 2024-08-16 NOTE — DIETITIAN INITIAL EVALUATION ADULT - PERTINENT MEDS FT
MEDICATIONS  (STANDING):  cefTRIAXone Injectable. 2000 milliGRAM(s) IV Push every 24 hours  DAPTOmycin IVPB 500 milliGRAM(s) IV Intermittent every 24 hours  pantoprazole    Tablet 40 milliGRAM(s) Oral before breakfast  polyethylene glycol 3350 17 Gram(s) Oral daily  senna 2 Tablet(s) Oral at bedtime  sodium chloride 0.9%. 1000 milliLiter(s) (125 mL/Hr) IV Continuous <Continuous>    MEDICATIONS  (PRN):  aluminum hydroxide/magnesium hydroxide/simethicone Suspension 30 milliLiter(s) Oral four times a day PRN Indigestion  bisacodyl Suppository 10 milliGRAM(s) Rectal once PRN Constipation  melatonin 3 milliGRAM(s) Oral at bedtime PRN Insomnia  ondansetron Injectable 4 milliGRAM(s) IV Push every 6 hours PRN Nausea and/or Vomiting  oxyCODONE    IR 5 milliGRAM(s) Oral every 3 hours PRN Mild Pain (1 - 3)  oxyCODONE    IR 10 milliGRAM(s) Oral every 3 hours PRN Moderate Pain (4 - 6)

## 2024-08-16 NOTE — DIETITIAN INITIAL EVALUATION ADULT - ORAL INTAKE PTA/DIET HISTORY
Nutrition assessment completed. Pt reports good appetite/po intake prior to admission and currently. Follows a regular diet, no weight changes reported. Encouraged HBV protein sources. RD to follow up as feasible.

## 2024-08-17 RX ADMIN — ENOXAPARIN SODIUM 30 MILLIGRAM(S): 100 INJECTION SUBCUTANEOUS at 18:54

## 2024-08-17 RX ADMIN — DAPTOMYCIN 120 MILLIGRAM(S): 500 INJECTION, POWDER, LYOPHILIZED, FOR SOLUTION INTRAVENOUS at 18:55

## 2024-08-17 RX ADMIN — Medication 2000 MILLIGRAM(S): at 18:54

## 2024-08-17 RX ADMIN — Medication 40 MILLIGRAM(S): at 05:47

## 2024-08-17 RX ADMIN — CELECOXIB 200 MILLIGRAM(S): 400 CAPSULE ORAL at 06:45

## 2024-08-17 RX ADMIN — CELECOXIB 200 MILLIGRAM(S): 400 CAPSULE ORAL at 05:47

## 2024-08-17 RX ADMIN — ENOXAPARIN SODIUM 30 MILLIGRAM(S): 100 INJECTION SUBCUTANEOUS at 05:47

## 2024-08-17 RX ADMIN — CELECOXIB 200 MILLIGRAM(S): 400 CAPSULE ORAL at 18:54

## 2024-08-17 RX ADMIN — Medication 3 MILLIGRAM(S): at 21:10

## 2024-08-17 NOTE — PROGRESS NOTE ADULT - SUBJECTIVE AND OBJECTIVE BOX
VIRI DIXON    [id number]    History: 58y Female is status post right total knee irrigation  and debridement / poly exchange and antibiotic bead placement on 8/10, POD#7. Knee immobilizer in place.  Patient is doing well and is comfortable. The patient's pain is controlled using the prescribed pain medications. The patient is participating in physical therapy, WBAT in Knee immobilizer.  Denies nausea, vomiting, chest pain, shortness of breath, abdominal pain or fever. No new orthopedic complaints.    Vital Signs Last 24 Hrs  T(C): 36.7 (17 Aug 2024 05:23), Max: 36.8 (16 Aug 2024 16:27)  T(F): 98.1 (17 Aug 2024 05:23), Max: 98.3 (17 Aug 2024 01:46)  HR: 85 (17 Aug 2024 05:23) (81 - 86)  BP: 137/88 (17 Aug 2024 05:23) (108/68 - 137/88)  BP(mean): --  RR: 18 (17 Aug 2024 05:23) (18 - 18)  SpO2: 95% (17 Aug 2024 05:23) (95% - 95%)    Parameters below as of 17 Aug 2024 05:23  Patient On (Oxygen Delivery Method): room air    Culture Results:   Culture - Joint (08.10.24 @ 20:32)    Gram Stain:   Few polymorphonuclear leukocytes per low power field  No organisms seen per oil power field   Specimen Source: Knee R Knee Synovial Fl #3   Culture Results:   No growth    Culture - Joint (08.10.24 @ 20:31)    Gram Stain:   Few polymorphonuclear leukocytes per low power field  No organisms seen per oil power field   Specimen Source: Knee R Knee Synovial Fl #2   Culture Results:   No growth at 5 days  Hold for extended incubation    Physical exam: The right knee ACE/ knee immobilizer in place. Sensation to light touch is grossly intact distally. Motor function distally is 5/5. Extensor hallucis longus and flexor hallucis longus are intact. No foot drop. 2+ dorsalis pedis pulse. Capillary refill is less than 2 seconds. No cyanosis.    Primary Orthopedic Assessment:  • s/p RIGHT total knee irrigation and debridement / Antibiotic bead placement POD#7.     Plan:   • DVT prophylaxis as prescribed, including use of compression devices and ankle pumps  • Continue physical therapy  • Weightbearing as tolerated of the right lower extremity with assistance of a walker and knee immobilizer at all times  • Pain control as clinically indicated  • Plan for return to OR Monday 8/19  • ID notes appreciated- continue IV Abx as prescribed

## 2024-08-18 LAB
ANION GAP SERPL CALC-SCNC: 14 MMOL/L — SIGNIFICANT CHANGE UP (ref 5–17)
BASOPHILS # BLD AUTO: 0.05 K/UL — SIGNIFICANT CHANGE UP (ref 0–0.2)
BASOPHILS NFR BLD AUTO: 0.5 % — SIGNIFICANT CHANGE UP (ref 0–2)
BLD GP AB SCN SERPL QL: SIGNIFICANT CHANGE UP
BUN SERPL-MCNC: 17.6 MG/DL — SIGNIFICANT CHANGE UP (ref 8–20)
CALCIUM SERPL-MCNC: 9.4 MG/DL — SIGNIFICANT CHANGE UP (ref 8.4–10.5)
CHLORIDE SERPL-SCNC: 102 MMOL/L — SIGNIFICANT CHANGE UP (ref 96–108)
CO2 SERPL-SCNC: 25 MMOL/L — SIGNIFICANT CHANGE UP (ref 22–29)
CREAT SERPL-MCNC: 0.62 MG/DL — SIGNIFICANT CHANGE UP (ref 0.5–1.3)
EGFR: 103 ML/MIN/1.73M2 — SIGNIFICANT CHANGE UP
EOSINOPHIL # BLD AUTO: 0.78 K/UL — HIGH (ref 0–0.5)
EOSINOPHIL NFR BLD AUTO: 8.5 % — HIGH (ref 0–6)
GLUCOSE SERPL-MCNC: 93 MG/DL — SIGNIFICANT CHANGE UP (ref 70–99)
HCT VFR BLD CALC: 34.5 % — SIGNIFICANT CHANGE UP (ref 34.5–45)
HGB BLD-MCNC: 10.9 G/DL — LOW (ref 11.5–15.5)
IMM GRANULOCYTES NFR BLD AUTO: 1.1 % — HIGH (ref 0–0.9)
LYMPHOCYTES # BLD AUTO: 2.3 K/UL — SIGNIFICANT CHANGE UP (ref 1–3.3)
LYMPHOCYTES # BLD AUTO: 25 % — SIGNIFICANT CHANGE UP (ref 13–44)
MCHC RBC-ENTMCNC: 27.2 PG — SIGNIFICANT CHANGE UP (ref 27–34)
MCHC RBC-ENTMCNC: 31.6 GM/DL — LOW (ref 32–36)
MCV RBC AUTO: 86 FL — SIGNIFICANT CHANGE UP (ref 80–100)
MONOCYTES # BLD AUTO: 0.88 K/UL — SIGNIFICANT CHANGE UP (ref 0–0.9)
MONOCYTES NFR BLD AUTO: 9.6 % — SIGNIFICANT CHANGE UP (ref 2–14)
NEUTROPHILS # BLD AUTO: 5.1 K/UL — SIGNIFICANT CHANGE UP (ref 1.8–7.4)
NEUTROPHILS NFR BLD AUTO: 55.3 % — SIGNIFICANT CHANGE UP (ref 43–77)
PLATELET # BLD AUTO: 406 K/UL — HIGH (ref 150–400)
POTASSIUM SERPL-MCNC: 3.7 MMOL/L — SIGNIFICANT CHANGE UP (ref 3.5–5.3)
POTASSIUM SERPL-SCNC: 3.7 MMOL/L — SIGNIFICANT CHANGE UP (ref 3.5–5.3)
RBC # BLD: 4.01 M/UL — SIGNIFICANT CHANGE UP (ref 3.8–5.2)
RBC # FLD: 12.9 % — SIGNIFICANT CHANGE UP (ref 10.3–14.5)
SODIUM SERPL-SCNC: 140 MMOL/L — SIGNIFICANT CHANGE UP (ref 135–145)
WBC # BLD: 9.21 K/UL — SIGNIFICANT CHANGE UP (ref 3.8–10.5)
WBC # FLD AUTO: 9.21 K/UL — SIGNIFICANT CHANGE UP (ref 3.8–10.5)

## 2024-08-18 RX ORDER — ENOXAPARIN SODIUM 100 MG/ML
30 INJECTION SUBCUTANEOUS ONCE
Refills: 0 | Status: COMPLETED | OUTPATIENT
Start: 2024-08-18 | End: 2024-08-18

## 2024-08-18 RX ADMIN — CELECOXIB 200 MILLIGRAM(S): 400 CAPSULE ORAL at 18:30

## 2024-08-18 RX ADMIN — CELECOXIB 200 MILLIGRAM(S): 400 CAPSULE ORAL at 18:12

## 2024-08-18 RX ADMIN — ENOXAPARIN SODIUM 30 MILLIGRAM(S): 100 INJECTION SUBCUTANEOUS at 05:29

## 2024-08-18 RX ADMIN — Medication 3 MILLIGRAM(S): at 21:17

## 2024-08-18 RX ADMIN — Medication 2000 MILLIGRAM(S): at 18:13

## 2024-08-18 RX ADMIN — ENOXAPARIN SODIUM 30 MILLIGRAM(S): 100 INJECTION SUBCUTANEOUS at 18:12

## 2024-08-18 RX ADMIN — CELECOXIB 200 MILLIGRAM(S): 400 CAPSULE ORAL at 05:29

## 2024-08-18 RX ADMIN — Medication 40 MILLIGRAM(S): at 05:29

## 2024-08-18 RX ADMIN — DAPTOMYCIN 120 MILLIGRAM(S): 500 INJECTION, POWDER, LYOPHILIZED, FOR SOLUTION INTRAVENOUS at 18:12

## 2024-08-18 RX ADMIN — CELECOXIB 200 MILLIGRAM(S): 400 CAPSULE ORAL at 06:10

## 2024-08-18 NOTE — PROGRESS NOTE ADULT - SUBJECTIVE AND OBJECTIVE BOX
VIRI DIXON    [id number]    History: 58y Female is status post right total knee irrigation  and debridement / poly exchange and antibiotic bead placement on 8/10, POD#8. Knee immobilizer in place.  Patient is doing well and is comfortable. The patient's pain is controlled using the prescribed pain medications. The patient is participating in physical therapy, WBAT in Knee immobilizer.  Denies nausea, vomiting, chest pain, shortness of breath, abdominal pain or fever. No new orthopedic complaints.    Vital Signs Last 24 Hrs  T(C): 36.4 (18 Aug 2024 09:30), Max: 36.8 (17 Aug 2024 16:00)  T(F): 97.6 (18 Aug 2024 09:30), Max: 98.3 (18 Aug 2024 04:48)  HR: 83 (18 Aug 2024 09:30) (80 - 85)  BP: 129/80 (18 Aug 2024 09:30) (108/65 - 130/82)  BP(mean): --  RR: 18 (18 Aug 2024 09:30) (18 - 18)  SpO2: 94% (18 Aug 2024 09:30) (93% - 96%)    Parameters below as of 18 Aug 2024 09:30  Patient On (Oxygen Delivery Method): room air    Culture Results:   Culture - Joint (08.10.24 @ 20:32)    Gram Stain:   Few polymorphonuclear leukocytes per low power field  No organisms seen per oil power field   Specimen Source: Knee R Knee Synovial Fl #3   Culture Results:   No growth    Culture - Joint (08.10.24 @ 20:31)    Gram Stain:   Few polymorphonuclear leukocytes per low power field  No organisms seen per oil power field   Specimen Source: Knee R Knee Synovial Fl #2   Culture Results:   No growth at 5 days  Hold for extended incubation    Physical exam: The right knee ACE/ knee immobilizer in place. Sensation to light touch is grossly intact distally. Motor function distally is 5/5. Extensor hallucis longus and flexor hallucis longus are intact. No foot drop. 2+ dorsalis pedis pulse. Capillary refill is less than 2 seconds. No cyanosis.    Primary Orthopedic Assessment:  • s/p RIGHT total knee irrigation and debridement / Antibiotic bead placement POD#8.     Plan:   • NPO after midnight  • Hold Lovenox 8/19  • Type and screen ordered  • 1 unit PRBC on hold for OR  • DVT prophylaxis as prescribed, including use of compression devices and ankle pumps  • Continue physical therapy  • Weightbearing as tolerated of the right lower extremity with assistance of a walker and knee immobilizer at all times  • Pain control as clinically indicated  • Plan for return to OR tomorrow 8/19  • continue IV Abx as prescribed per ID

## 2024-08-19 ENCOUNTER — APPOINTMENT (OUTPATIENT)
Dept: ORTHOPEDIC SURGERY | Facility: HOSPITAL | Age: 59
End: 2024-08-19

## 2024-08-19 PROCEDURE — 11982 REMOVE DRUG IMPLANT DEVICE: CPT

## 2024-08-19 PROCEDURE — 27486 REVISE/REPLACE KNEE JOINT: CPT | Mod: AS,RT,58,52

## 2024-08-19 PROCEDURE — 11982 REMOVE DRUG IMPLANT DEVICE: CPT | Mod: AS,RT

## 2024-08-19 PROCEDURE — 73560 X-RAY EXAM OF KNEE 1 OR 2: CPT | Mod: 26,RT

## 2024-08-19 PROCEDURE — 27486 REVISE/REPLACE KNEE JOINT: CPT | Mod: 58,52,RT

## 2024-08-19 DEVICE — INSERT TIB BEARING CS X3 SZ 5 11MM: Type: IMPLANTABLE DEVICE | Site: RIGHT | Status: FUNCTIONAL

## 2024-08-19 DEVICE — IMPLANTABLE DEVICE: Type: IMPLANTABLE DEVICE | Site: RIGHT | Status: FUNCTIONAL

## 2024-08-19 RX ORDER — HYDROMORPHONE HYDROCHLORIDE 2 MG/1
4 TABLET ORAL
Refills: 0 | Status: DISCONTINUED | OUTPATIENT
Start: 2024-08-19 | End: 2024-08-21

## 2024-08-19 RX ORDER — CYCLOBENZAPRINE HCL 10 MG
5 TABLET ORAL THREE TIMES A DAY
Refills: 0 | Status: DISCONTINUED | OUTPATIENT
Start: 2024-08-19 | End: 2024-08-21

## 2024-08-19 RX ORDER — ACETAMINOPHEN 325 MG/1
975 TABLET ORAL EVERY 8 HOURS
Refills: 0 | Status: DISCONTINUED | OUTPATIENT
Start: 2024-08-19 | End: 2024-08-21

## 2024-08-19 RX ORDER — FENTANYL CITRATE 50 UG/ML
50 INJECTION INTRAMUSCULAR; INTRAVENOUS
Refills: 0 | Status: DISCONTINUED | OUTPATIENT
Start: 2024-08-19 | End: 2024-08-19

## 2024-08-19 RX ORDER — APIXABAN 5 MG/1
2.5 TABLET, FILM COATED ORAL EVERY 12 HOURS
Refills: 0 | Status: DISCONTINUED | OUTPATIENT
Start: 2024-08-20 | End: 2024-08-21

## 2024-08-19 RX ORDER — ACETAMINOPHEN 325 MG/1
1000 TABLET ORAL ONCE
Refills: 0 | Status: COMPLETED | OUTPATIENT
Start: 2024-08-19 | End: 2024-08-19

## 2024-08-19 RX ORDER — ONDANSETRON 2 MG/ML
4 INJECTION, SOLUTION INTRAMUSCULAR; INTRAVENOUS EVERY 6 HOURS
Refills: 0 | Status: DISCONTINUED | OUTPATIENT
Start: 2024-08-19 | End: 2024-08-21

## 2024-08-19 RX ORDER — ONDANSETRON 2 MG/ML
4 INJECTION, SOLUTION INTRAMUSCULAR; INTRAVENOUS ONCE
Refills: 0 | Status: COMPLETED | OUTPATIENT
Start: 2024-08-19 | End: 2024-08-19

## 2024-08-19 RX ORDER — CELECOXIB 400 MG/1
200 CAPSULE ORAL EVERY 12 HOURS
Refills: 0 | Status: DISCONTINUED | OUTPATIENT
Start: 2024-08-21 | End: 2024-08-21

## 2024-08-19 RX ORDER — PANTOPRAZOLE SODIUM 40 MG
40 TABLET, DELAYED RELEASE (ENTERIC COATED) ORAL
Refills: 0 | Status: DISCONTINUED | OUTPATIENT
Start: 2024-08-19 | End: 2024-08-21

## 2024-08-19 RX ORDER — FENTANYL CITRATE 50 UG/ML
25 INJECTION INTRAMUSCULAR; INTRAVENOUS
Refills: 0 | Status: DISCONTINUED | OUTPATIENT
Start: 2024-08-19 | End: 2024-08-19

## 2024-08-19 RX ORDER — POLYETHYLENE GLYCOL 3350 17 G/17G
17 POWDER, FOR SOLUTION ORAL AT BEDTIME
Refills: 0 | Status: DISCONTINUED | OUTPATIENT
Start: 2024-08-19 | End: 2024-08-21

## 2024-08-19 RX ORDER — CEFAZOLIN SODIUM 2 G/100ML
2000 INJECTION, SOLUTION INTRAVENOUS
Refills: 0 | Status: COMPLETED | OUTPATIENT
Start: 2024-08-19 | End: 2024-08-20

## 2024-08-19 RX ORDER — KETOROLAC TROMETHAMINE 30 MG/ML
15 INJECTION, SOLUTION INTRAMUSCULAR EVERY 6 HOURS
Refills: 0 | Status: DISCONTINUED | OUTPATIENT
Start: 2024-08-19 | End: 2024-08-20

## 2024-08-19 RX ORDER — OXYCODONE HYDROCHLORIDE 5 MG/1
10 TABLET ORAL
Refills: 0 | Status: DISCONTINUED | OUTPATIENT
Start: 2024-08-19 | End: 2024-08-21

## 2024-08-19 RX ORDER — OXYCODONE HYDROCHLORIDE 5 MG/1
5 TABLET ORAL
Refills: 0 | Status: DISCONTINUED | OUTPATIENT
Start: 2024-08-19 | End: 2024-08-21

## 2024-08-19 RX ORDER — SODIUM CHLORIDE 9 MG/ML
1000 INJECTION INTRAMUSCULAR; INTRAVENOUS; SUBCUTANEOUS
Refills: 0 | Status: DISCONTINUED | OUTPATIENT
Start: 2024-08-19 | End: 2024-08-21

## 2024-08-19 RX ORDER — SENNA 187 MG
2 TABLET ORAL AT BEDTIME
Refills: 0 | Status: DISCONTINUED | OUTPATIENT
Start: 2024-08-19 | End: 2024-08-21

## 2024-08-19 RX ORDER — DAPTOMYCIN 500 MG/10ML
500 INJECTION, POWDER, LYOPHILIZED, FOR SOLUTION INTRAVENOUS EVERY 24 HOURS
Refills: 0 | Status: DISCONTINUED | OUTPATIENT
Start: 2024-08-20 | End: 2024-08-21

## 2024-08-19 RX ORDER — CEFAZOLIN SODIUM 2 G/100ML
2000 INJECTION, SOLUTION INTRAVENOUS ONCE
Refills: 0 | Status: DISCONTINUED | OUTPATIENT
Start: 2024-08-19 | End: 2024-08-19

## 2024-08-19 RX ORDER — SODIUM CHLORIDE 9 MG/ML
500 INJECTION INTRAMUSCULAR; INTRAVENOUS; SUBCUTANEOUS ONCE
Refills: 0 | Status: COMPLETED | OUTPATIENT
Start: 2024-08-19 | End: 2024-08-19

## 2024-08-19 RX ADMIN — KETOROLAC TROMETHAMINE 15 MILLIGRAM(S): 30 INJECTION, SOLUTION INTRAMUSCULAR at 23:57

## 2024-08-19 RX ADMIN — SODIUM CHLORIDE 500 MILLILITER(S): 9 INJECTION INTRAMUSCULAR; INTRAVENOUS; SUBCUTANEOUS at 20:25

## 2024-08-19 RX ADMIN — Medication 5 MILLIGRAM(S): at 23:44

## 2024-08-19 RX ADMIN — ACETAMINOPHEN 400 MILLIGRAM(S): 325 TABLET ORAL at 23:58

## 2024-08-19 RX ADMIN — OXYCODONE HYDROCHLORIDE 10 MILLIGRAM(S): 5 TABLET ORAL at 23:00

## 2024-08-19 RX ADMIN — FENTANYL CITRATE 50 MICROGRAM(S): 50 INJECTION INTRAMUSCULAR; INTRAVENOUS at 20:51

## 2024-08-19 RX ADMIN — SODIUM CHLORIDE 125 MILLILITER(S): 9 INJECTION INTRAMUSCULAR; INTRAVENOUS; SUBCUTANEOUS at 23:36

## 2024-08-19 RX ADMIN — CELECOXIB 200 MILLIGRAM(S): 400 CAPSULE ORAL at 06:54

## 2024-08-19 RX ADMIN — CELECOXIB 200 MILLIGRAM(S): 400 CAPSULE ORAL at 06:23

## 2024-08-19 RX ADMIN — FENTANYL CITRATE 50 MICROGRAM(S): 50 INJECTION INTRAMUSCULAR; INTRAVENOUS at 21:14

## 2024-08-19 RX ADMIN — Medication 40 MILLIGRAM(S): at 06:26

## 2024-08-19 RX ADMIN — Medication 2 TABLET(S): at 23:00

## 2024-08-19 RX ADMIN — ONDANSETRON 4 MILLIGRAM(S): 2 INJECTION, SOLUTION INTRAMUSCULAR; INTRAVENOUS at 20:25

## 2024-08-19 RX ADMIN — FENTANYL CITRATE 50 MICROGRAM(S): 50 INJECTION INTRAMUSCULAR; INTRAVENOUS at 20:41

## 2024-08-19 NOTE — PROGRESS NOTE ADULT - SUBJECTIVE AND OBJECTIVE BOX
s/p Right knee I&D with polyethylene exchange and anitbiotic bead placement, now for stage 2 DAIR with removal of beads.  Afebrile.  OR and office cultures so far NGTD.  PCR negative aspiration.  Cultures being held for extended incubation.  On broad spectrum antibiotics.  Right knee dressing is dry.  NVID RLE.    Discussed with patient plan for DAIR stage 2 and antibiotic bead removal today.  All r/b/a discussed and all questions answered.  Will proceed with surgery today.

## 2024-08-19 NOTE — BRIEF OPERATIVE NOTE - NSICDXBRIEFPREOP_GEN_ALL_CORE_FT
PRE-OP DIAGNOSIS:  Infection of right knee 10-Aug-2024 13:39:56  Penny Gaspar  
PRE-OP DIAGNOSIS:  Infection of right knee 10-Aug-2024 13:39:56  Penny Gaspar

## 2024-08-19 NOTE — BRIEF OPERATIVE NOTE - NSICDXBRIEFPROCEDURE_GEN_ALL_CORE_FT
PROCEDURES:  Arthrotomy of knee with washout 10-Aug-2024 13:39:30 Right knee incision and drainage with antibiotic bead placement Penny Gaspar  
PROCEDURES:  Arthrotomy of knee with washout 10-Aug-2024 13:39:30 Right knee incision and drainage with antibiotic bead placement Penny Gaspar

## 2024-08-19 NOTE — BRIEF OPERATIVE NOTE - NSICDXBRIEFPOSTOP_GEN_ALL_CORE_FT
POST-OP DIAGNOSIS:  Infection of right knee 10-Aug-2024 13:40:15  Penny Gaspar  
POST-OP DIAGNOSIS:  Infection of right knee 10-Aug-2024 13:40:15  Penny Gaspar

## 2024-08-19 NOTE — PROGRESS NOTE ADULT - SUBJECTIVE AND OBJECTIVE BOX
Ortho Post Op Check    Name: VIRI DIXON    MR #: [id number]    Procedure: right total knee arthroplasty washout with removal of antibiotic beads, poly swap  Surgeon: Emile    Pt comfortable without complaints, pain controlled  Denies CP, SOB, N/V, numbness/tingling     General Exam:  Vital Signs Last 24 Hrs  T(C): 36.5 (08-19-24 @ 14:40), Max: 36.5 (08-19-24 @ 14:40)  T(F): 97.7 (08-19-24 @ 14:40), Max: 97.7 (08-19-24 @ 14:40)  HR: 80 (08-19-24 @ 14:40) (80 - 80)  BP: 131/71 (08-19-24 @ 14:40) (131/71 - 131/71)  BP(mean): --  RR: 16 (08-19-24 @ 14:40) (16 - 16)  SpO2: 100% (08-19-24 @ 14:40) (100% - 100%)    General: Pt Alert and oriented, NAD, controlled pain.  Prevena Dressings C/D/I. No bleeding. HV noted  Pulses: 2+ dorsalis pedis pulse. Cap refill < 2 sec.  Sensation: Grossly intact to light touch without deficit.  Motor: + EHL/FHL/TA/GS    T(C): 36.5 (08-19-24 @ 14:40), Max: 36.8 (08-19-24 @ 09:55)  HR: 80 (08-19-24 @ 14:40) (70 - 80)  BP: 131/71 (08-19-24 @ 14:40) (119/79 - 131/71)  RR: 16 (08-19-24 @ 14:40) (16 - 18)  SpO2: 100% (08-19-24 @ 14:40) (96% - 100%)    A/P: 58yFemale POD#0 s/p right total knee arthroplasty washout with removal of antibiotic beads, poly swap  - Stable  - Pain Control  - DVT ppx: Eliquis  - WBAT, PT/OT

## 2024-08-19 NOTE — ASU PREOP CHECKLIST - HAIR REMOVAL
Patient report received from previous RN, patient at this time is resting in chair with no acute distress, awaiting for CT result, states that she is feeling better, will continue to watch and assess patient, safety and comfort measures maintained. hair removal not indicated

## 2024-08-20 LAB
ANION GAP SERPL CALC-SCNC: 17 MMOL/L — SIGNIFICANT CHANGE UP (ref 5–17)
BUN SERPL-MCNC: 14.3 MG/DL — SIGNIFICANT CHANGE UP (ref 8–20)
CALCIUM SERPL-MCNC: 8.6 MG/DL — SIGNIFICANT CHANGE UP (ref 8.4–10.5)
CHLORIDE SERPL-SCNC: 103 MMOL/L — SIGNIFICANT CHANGE UP (ref 96–108)
CO2 SERPL-SCNC: 21 MMOL/L — LOW (ref 22–29)
CREAT SERPL-MCNC: 0.56 MG/DL — SIGNIFICANT CHANGE UP (ref 0.5–1.3)
EGFR: 106 ML/MIN/1.73M2 — SIGNIFICANT CHANGE UP
GLUCOSE SERPL-MCNC: 163 MG/DL — HIGH (ref 70–99)
GRAM STN FLD: SIGNIFICANT CHANGE UP
HCT VFR BLD CALC: 30.8 % — LOW (ref 34.5–45)
HGB BLD-MCNC: 9.9 G/DL — LOW (ref 11.5–15.5)
JOINT PATHOGENS PNL SNV NAA+NON-PROBE: SIGNIFICANT CHANGE UP
JOINT PCR SOURCE: SIGNIFICANT CHANGE UP
MCHC RBC-ENTMCNC: 28 PG — SIGNIFICANT CHANGE UP (ref 27–34)
MCHC RBC-ENTMCNC: 32.1 GM/DL — SIGNIFICANT CHANGE UP (ref 32–36)
MCV RBC AUTO: 87 FL — SIGNIFICANT CHANGE UP (ref 80–100)
PLATELET # BLD AUTO: 320 K/UL — SIGNIFICANT CHANGE UP (ref 150–400)
POTASSIUM SERPL-MCNC: 4.2 MMOL/L — SIGNIFICANT CHANGE UP (ref 3.5–5.3)
POTASSIUM SERPL-SCNC: 4.2 MMOL/L — SIGNIFICANT CHANGE UP (ref 3.5–5.3)
RBC # BLD: 3.54 M/UL — LOW (ref 3.8–5.2)
RBC # FLD: 13.2 % — SIGNIFICANT CHANGE UP (ref 10.3–14.5)
SODIUM SERPL-SCNC: 141 MMOL/L — SIGNIFICANT CHANGE UP (ref 135–145)
SPECIMEN SOURCE: SIGNIFICANT CHANGE UP
WBC # BLD: 13.03 K/UL — HIGH (ref 3.8–10.5)
WBC # FLD AUTO: 13.03 K/UL — HIGH (ref 3.8–10.5)

## 2024-08-20 PROCEDURE — 71045 X-RAY EXAM CHEST 1 VIEW: CPT | Mod: 26

## 2024-08-20 PROCEDURE — 99232 SBSQ HOSP IP/OBS MODERATE 35: CPT

## 2024-08-20 PROCEDURE — 76942 ECHO GUIDE FOR BIOPSY: CPT | Mod: 26,59

## 2024-08-20 PROCEDURE — 76937 US GUIDE VASCULAR ACCESS: CPT | Mod: 26,59

## 2024-08-20 PROCEDURE — 36573 INSJ PICC RS&I 5 YR+: CPT

## 2024-08-20 RX ORDER — SODIUM CHLORIDE 9 MG/ML
10 INJECTION INTRAMUSCULAR; INTRAVENOUS; SUBCUTANEOUS
Refills: 0 | Status: DISCONTINUED | OUTPATIENT
Start: 2024-08-20 | End: 2024-08-21

## 2024-08-20 RX ORDER — CHLORHEXIDINE GLUCONATE 40 MG/ML
1 SOLUTION TOPICAL
Refills: 0 | Status: DISCONTINUED | OUTPATIENT
Start: 2024-08-20 | End: 2024-08-21

## 2024-08-20 RX ADMIN — KETOROLAC TROMETHAMINE 15 MILLIGRAM(S): 30 INJECTION, SOLUTION INTRAMUSCULAR at 00:32

## 2024-08-20 RX ADMIN — ACETAMINOPHEN 975 MILLIGRAM(S): 325 TABLET ORAL at 09:00

## 2024-08-20 RX ADMIN — KETOROLAC TROMETHAMINE 15 MILLIGRAM(S): 30 INJECTION, SOLUTION INTRAMUSCULAR at 18:24

## 2024-08-20 RX ADMIN — Medication 40 MILLIGRAM(S): at 05:12

## 2024-08-20 RX ADMIN — OXYCODONE HYDROCHLORIDE 10 MILLIGRAM(S): 5 TABLET ORAL at 22:53

## 2024-08-20 RX ADMIN — APIXABAN 2.5 MILLIGRAM(S): 5 TABLET, FILM COATED ORAL at 18:25

## 2024-08-20 RX ADMIN — CEFAZOLIN SODIUM 2000 MILLIGRAM(S): 2 INJECTION, SOLUTION INTRAVENOUS at 09:00

## 2024-08-20 RX ADMIN — OXYCODONE HYDROCHLORIDE 10 MILLIGRAM(S): 5 TABLET ORAL at 05:30

## 2024-08-20 RX ADMIN — OXYCODONE HYDROCHLORIDE 10 MILLIGRAM(S): 5 TABLET ORAL at 19:39

## 2024-08-20 RX ADMIN — KETOROLAC TROMETHAMINE 15 MILLIGRAM(S): 30 INJECTION, SOLUTION INTRAMUSCULAR at 05:12

## 2024-08-20 RX ADMIN — OXYCODONE HYDROCHLORIDE 10 MILLIGRAM(S): 5 TABLET ORAL at 18:39

## 2024-08-20 RX ADMIN — Medication 2000 MILLIGRAM(S): at 18:26

## 2024-08-20 RX ADMIN — ACETAMINOPHEN 1000 MILLIGRAM(S): 325 TABLET ORAL at 00:32

## 2024-08-20 RX ADMIN — KETOROLAC TROMETHAMINE 15 MILLIGRAM(S): 30 INJECTION, SOLUTION INTRAMUSCULAR at 19:24

## 2024-08-20 RX ADMIN — KETOROLAC TROMETHAMINE 15 MILLIGRAM(S): 30 INJECTION, SOLUTION INTRAMUSCULAR at 12:59

## 2024-08-20 RX ADMIN — CHLORHEXIDINE GLUCONATE 1 APPLICATION(S): 40 SOLUTION TOPICAL at 07:01

## 2024-08-20 RX ADMIN — Medication 5 MILLIGRAM(S): at 18:30

## 2024-08-20 RX ADMIN — OXYCODONE HYDROCHLORIDE 10 MILLIGRAM(S): 5 TABLET ORAL at 00:00

## 2024-08-20 RX ADMIN — ACETAMINOPHEN 975 MILLIGRAM(S): 325 TABLET ORAL at 19:25

## 2024-08-20 RX ADMIN — APIXABAN 2.5 MILLIGRAM(S): 5 TABLET, FILM COATED ORAL at 05:12

## 2024-08-20 RX ADMIN — CEFAZOLIN SODIUM 2000 MILLIGRAM(S): 2 INJECTION, SOLUTION INTRAVENOUS at 01:39

## 2024-08-20 RX ADMIN — OXYCODONE HYDROCHLORIDE 10 MILLIGRAM(S): 5 TABLET ORAL at 23:53

## 2024-08-20 RX ADMIN — ACETAMINOPHEN 975 MILLIGRAM(S): 325 TABLET ORAL at 18:25

## 2024-08-20 RX ADMIN — SODIUM CHLORIDE 125 MILLILITER(S): 9 INJECTION INTRAMUSCULAR; INTRAVENOUS; SUBCUTANEOUS at 22:54

## 2024-08-20 RX ADMIN — DAPTOMYCIN 120 MILLIGRAM(S): 500 INJECTION, POWDER, LYOPHILIZED, FOR SOLUTION INTRAVENOUS at 18:26

## 2024-08-20 RX ADMIN — OXYCODONE HYDROCHLORIDE 10 MILLIGRAM(S): 5 TABLET ORAL at 04:37

## 2024-08-20 NOTE — PROGRESS NOTE ADULT - ASSESSMENT
58F s/p Right total knee arthroplasty on 5/23/2024 admitted for double DAIR for right knee PJI. Seen in the office by Dr. Baptiste on 8/8 >> patient c/o posterior knee swelling and pain. Underwent arthrocentesis (no gross purulence).     Additional information - not on antibiotics prior to admission. No wound dehiscence. No knee swelling/redness, fever or chills; symptoms presented as worsening severe posterior leg pain (like pulling of muscles) relived after arthrocentesis.     8/8 Synovial fluid cell count with RBC 84,000, nuc cell 30,798 (N 72%)  8/8 Synovial culture - Gram w/o organisms. Culture NEG    Joint PCR NEG    s/p DAIR #1 with antibiotics bead placement on 8/10     Surgical cultures - Gram w/o organisms. Cultures ngtd     Unable to add Joint PCR to surgical specimens (sent in swab)    s/p DAIR #2 on 8/19/24, antibiotic beads removed, no pus encountered     joint PCR repeated, will f/u     f/u OR cultures  post op reactive leukocytosis  Afebrile     Plan:  PICC  c/w  daptomycin 500mg IV daily + ceftriaxone 2g IV daily through 9/30/24 weekly CBC CMP ESR CRP CK in the absence of micro data   Baseline CPK 8/14=37  outpatient f/u with Dr Jose 2 weeks    d/w Dr Baptiste, [facility],    steady

## 2024-08-20 NOTE — PHYSICAL THERAPY INITIAL EVALUATION ADULT - MANUAL MUSCLE TESTING RESULTS, REHAB EVAL
right LE grossly 3/5, left LE grossly 4/5
right knee not tested, right hip 4/5/no strength deficits were identified

## 2024-08-20 NOTE — PROGRESS NOTE ADULT - TIME BILLING
reviewing labs, notes, orders, images, medications and coordinating care
reviewing labs, notes, orders, images, medications
reviewing labs, notes, orders, images, medications and coordinating care
reviewing labs, notes, orders, images, medications

## 2024-08-20 NOTE — PHYSICAL THERAPY INITIAL EVALUATION ADULT - ADDITIONAL COMMENTS
owns and uses a SAC, owns a RW, 2 steps c post to enter home, no stairs within home, participates in outpatient PT
owns and uses a SAC, owns a RW, 2 steps c post to enter home, no stairs within home, participates in outpatient PT

## 2024-08-20 NOTE — PHYSICAL THERAPY INITIAL EVALUATION ADULT - SITTING BALANCE: STATIC
Chief Complaint   Patient presents with     Blood Draw     Labs drawn via  by RN. VS taken.     Labs drawn with vpt by rn.  Urine collected. Pt tolerated well.  VS taken.  Pt checked in for next appt.      
normal balance
normal balance

## 2024-08-20 NOTE — PROGRESS NOTE ADULT - SUBJECTIVE AND OBJECTIVE BOX
[facility]    Chief Complaint:   Right knee septic joint    SUBJECTIVE / OVERNIGHT EVENTS:      Patient denies chest pain, SOB, abd pain, N/V, fever, chills, dysuria or any other complaints. All remainder ROS negative.     MEDICATIONS  (STANDING):  acetaminophen     Tablet .. 975 milliGRAM(s) Oral every 8 hours  apixaban 2.5 milliGRAM(s) Oral every 12 hours  ceFAZolin  Injectable. 2000 milliGRAM(s) IV Push <User Schedule>  cefTRIAXone Injectable. 2000 milliGRAM(s) IV Push every 24 hours  DAPTOmycin IVPB 500 milliGRAM(s) IV Intermittent every 24 hours  ketorolac   Injectable 15 milliGRAM(s) IV Push every 6 hours  pantoprazole    Tablet 40 milliGRAM(s) Oral before breakfast  polyethylene glycol 3350 17 Gram(s) Oral at bedtime  senna 2 Tablet(s) Oral at bedtime  sodium chloride 0.9%. 1000 milliLiter(s) (125 mL/Hr) IV Continuous <Continuous>    MEDICATIONS  (PRN):  cyclobenzaprine 5 milliGRAM(s) Oral three times a day PRN Muscle Spasm  HYDROmorphone   Tablet 4 milliGRAM(s) Oral every 3 hours PRN Severe Pain (7 - 10)  magnesium hydroxide Suspension 30 milliLiter(s) Oral daily PRN Constipation  melatonin 3 milliGRAM(s) Oral at bedtime PRN Insomnia  ondansetron Injectable 4 milliGRAM(s) IV Push every 6 hours PRN Nausea and/or Vomiting  oxyCODONE    IR 5 milliGRAM(s) Oral every 3 hours PRN Mild Pain (1 - 3)  oxyCODONE    IR 10 milliGRAM(s) Oral every 3 hours PRN Moderate Pain (4 - 6)        I&O's Summary    19 Aug 2024 07:01  -  20 Aug 2024 07:00  --------------------------------------------------------  IN: 1675 mL / OUT: 880 mL / NET: 795 mL        PHYSICAL EXAM:  Vital Signs Last 24 Hrs  T(C): 36.7 (20 Aug 2024 09:41), Max: 36.7 (19 Aug 2024 22:45)  T(F): 98 (20 Aug 2024 09:41), Max: 98 (19 Aug 2024 22:45)  HR: 85 (20 Aug 2024 09:41) (72 - 98)  BP: 125/75 (20 Aug 2024 09:41) (112/61 - 137/83)  BP(mean): 85 (19 Aug 2024 21:29) (76 - 89)  RR: 18 (20 Aug 2024 09:41) (12 - 18)  SpO2: 97% (20 Aug 2024 09:41) (89% - 100%)    Parameters below as of 20 Aug 2024 09:41  Patient On (Oxygen Delivery Method): room air          CONSTITUTIONAL: NAD, sitting up in bed  ENMT:   RESPIRATORY: Normal respiratory effort; lungs are clear to auscultation bilaterally  CARDIOVASCULAR: Regular rate and rhythm, normal S1 and S2   ABDOMEN: Nontender to palpation, normoactive bowel sounds  MUSCULOSKELETAL:  No clubbing or cyanosis of digits   PSYCH: A+O to person, place, and time; affect appropriate  NEUROLOGY: No gross sensory or motor deficits       LABS:                        9.9    13.03 )-----------( 320      ( 20 Aug 2024 06:44 )             30.8     08-20    141  |  103  |  14.3  ----------------------------<  163<H>  4.2   |  21.0<L>  |  0.56    Ca    8.6      20 Aug 2024 06:44            Urinalysis Basic - ( 20 Aug 2024 06:44 )    Color: x / Appearance: x / SG: x / pH: x  Gluc: 163 mg/dL / Ketone: x  / Bili: x / Urobili: x   Blood: x / Protein: x / Nitrite: x   Leuk Esterase: x / RBC: x / WBC x   Sq Epi: x / Non Sq Epi: x / Bacteria: x        Culture - Joint (collected 19 Aug 2024 18:00)  Source: Knee right knee synovial fluid (swab)  Gram Stain (20 Aug 2024 07:07):    No polymorphonuclear cells seen    No organisms seen    by cytocentrifuge    Culture - Joint (collected 19 Aug 2024 18:00)  Source: Knee right knee synovial fluid (swab)  Gram Stain (20 Aug 2024 07:10):    polymorphonuclear leukocytes seen    No organisms seen    by cytocentrifuge    Culture - Joint (collected 19 Aug 2024 18:00)  Source: Knee right knee synovial fluid  Gram Stain (20 Aug 2024 07:10):    polymorphonuclear leukocytes seen    No organisms seen    by cytocentrifuge   [facility] Medicine    Chief Complaint:   Right knee septic joint    SUBJECTIVE / OVERNIGHT EVENTS:  No complaints       Patient denies chest pain, SOB, abd pain, N/V, fever, chills, dysuria or any other complaints. All remainder ROS negative.     MEDICATIONS  (STANDING):  acetaminophen     Tablet .. 975 milliGRAM(s) Oral every 8 hours  apixaban 2.5 milliGRAM(s) Oral every 12 hours  ceFAZolin  Injectable. 2000 milliGRAM(s) IV Push <User Schedule>  cefTRIAXone Injectable. 2000 milliGRAM(s) IV Push every 24 hours  DAPTOmycin IVPB 500 milliGRAM(s) IV Intermittent every 24 hours  ketorolac   Injectable 15 milliGRAM(s) IV Push every 6 hours  pantoprazole    Tablet 40 milliGRAM(s) Oral before breakfast  polyethylene glycol 3350 17 Gram(s) Oral at bedtime  senna 2 Tablet(s) Oral at bedtime  sodium chloride 0.9%. 1000 milliLiter(s) (125 mL/Hr) IV Continuous <Continuous>    MEDICATIONS  (PRN):  cyclobenzaprine 5 milliGRAM(s) Oral three times a day PRN Muscle Spasm  HYDROmorphone   Tablet 4 milliGRAM(s) Oral every 3 hours PRN Severe Pain (7 - 10)  magnesium hydroxide Suspension 30 milliLiter(s) Oral daily PRN Constipation  melatonin 3 milliGRAM(s) Oral at bedtime PRN Insomnia  ondansetron Injectable 4 milliGRAM(s) IV Push every 6 hours PRN Nausea and/or Vomiting  oxyCODONE    IR 5 milliGRAM(s) Oral every 3 hours PRN Mild Pain (1 - 3)  oxyCODONE    IR 10 milliGRAM(s) Oral every 3 hours PRN Moderate Pain (4 - 6)        I&O's Summary    19 Aug 2024 07:01  -  20 Aug 2024 07:00  --------------------------------------------------------  IN: 1675 mL / OUT: 880 mL / NET: 795 mL        PHYSICAL EXAM:  Vital Signs Last 24 Hrs  T(C): 36.7 (20 Aug 2024 09:41), Max: 36.7 (19 Aug 2024 22:45)  T(F): 98 (20 Aug 2024 09:41), Max: 98 (19 Aug 2024 22:45)  HR: 85 (20 Aug 2024 09:41) (72 - 98)  BP: 125/75 (20 Aug 2024 09:41) (112/61 - 137/83)  BP(mean): 85 (19 Aug 2024 21:29) (76 - 89)  RR: 18 (20 Aug 2024 09:41) (12 - 18)  SpO2: 97% (20 Aug 2024 09:41) (89% - 100%)    Parameters below as of 20 Aug 2024 09:41  Patient On (Oxygen Delivery Method): room air          CONSTITUTIONAL: NAD, sitting up in bed  RESPIRATORY: Normal respiratory effort; lungs are clear to auscultation bilaterally  CARDIOVASCULAR: Regular rate and rhythm, normal S1 and S2   ABDOMEN: Nontender to palpation, normoactive bowel sounds  MUSCULOSKELETAL:  RLE dressing   PSYCH: A+O to person, place, and time; affect appropriate  NEUROLOGY: No gross sensory or motor deficits       LABS:                        9.9    13.03 )-----------( 320      ( 20 Aug 2024 06:44 )             30.8     08-20    141  |  103  |  14.3  ----------------------------<  163<H>  4.2   |  21.0<L>  |  0.56    Ca    8.6      20 Aug 2024 06:44            Urinalysis Basic - ( 20 Aug 2024 06:44 )    Color: x / Appearance: x / SG: x / pH: x  Gluc: 163 mg/dL / Ketone: x  / Bili: x / Urobili: x   Blood: x / Protein: x / Nitrite: x   Leuk Esterase: x / RBC: x / WBC x   Sq Epi: x / Non Sq Epi: x / Bacteria: x        Culture - Joint (collected 19 Aug 2024 18:00)  Source: Knee right knee synovial fluid (swab)  Gram Stain (20 Aug 2024 07:07):    No polymorphonuclear cells seen    No organisms seen    by cytocentrifuge    Culture - Joint (collected 19 Aug 2024 18:00)  Source: Knee right knee synovial fluid (swab)  Gram Stain (20 Aug 2024 07:10):    polymorphonuclear leukocytes seen    No organisms seen    by cytocentrifuge    Culture - Joint (collected 19 Aug 2024 18:00)  Source: Knee right knee synovial fluid  Gram Stain (20 Aug 2024 07:10):    polymorphonuclear leukocytes seen    No organisms seen    by cytocentrifuge

## 2024-08-20 NOTE — PHYSICAL THERAPY INITIAL EVALUATION ADULT - IMPAIRMENTS FOUND, PT EVAL
gait, locomotion, and balance/muscle strength/ROM
aerobic capacity/endurance/gait, locomotion, and balance/muscle strength

## 2024-08-20 NOTE — PHYSICAL THERAPY INITIAL EVALUATION ADULT - GENERAL OBSERVATIONS, REHAB EVAL
Pt received in bed + IV + hemovac drain + prevena, + SCDs, in 2/10 pain, agreeable to PT eval
received in recliner chair, right knee immobilizer, drain, IV line, agreeable to PT

## 2024-08-20 NOTE — PROVIDER CONTACT NOTE (OTHER) - SITUATION
Attended joint education session on 8/16/2024 via online method with opportunity to ask questions. Contact information for follow up questions given.

## 2024-08-20 NOTE — PHYSICAL THERAPY INITIAL EVALUATION ADULT - PERTINENT HX OF CURRENT PROBLEM, REHAB EVAL
58y Female is status post right total knee arthroplasty repeat I&D on 8/19/24 after I&D with bead placement on 8/10/24
right knee septic arthritis, s/p Right knee incision and drainage with antibiotic bead placement by Dr. Baptiste 8/10

## 2024-08-20 NOTE — PROGRESS NOTE ADULT - SUBJECTIVE AND OBJECTIVE BOX
VIRI DIXON    [id number]    History: 58y Female is status post right total knee arthroplasty repeat I&D on 8/19/24 after I&D with bead placement on 8/10/24 Patient is doing well and is comfortable. The patient's pain is controlled using the prescribed pain medications. The patient is participating in physical therapy. Denies nausea, vomiting, chest pain, shortness of breath, abdominal pain or fever. No new complaints.                            9.9    13.03 )-----------( 320      ( 20 Aug 2024 06:44 )             30.8     08-18    140  |  102  |  17.6  ----------------------------<  93  3.7   |  25.0  |  0.62    Ca    9.4      18 Aug 2024 09:55        MEDICATIONS  (STANDING):  acetaminophen     Tablet .. 975 milliGRAM(s) Oral every 8 hours  apixaban 2.5 milliGRAM(s) Oral every 12 hours  ceFAZolin  Injectable. 2000 milliGRAM(s) IV Push <User Schedule>  cefTRIAXone Injectable. 2000 milliGRAM(s) IV Push every 24 hours  DAPTOmycin IVPB 500 milliGRAM(s) IV Intermittent every 24 hours  ketorolac   Injectable 15 milliGRAM(s) IV Push every 6 hours  pantoprazole    Tablet 40 milliGRAM(s) Oral before breakfast  polyethylene glycol 3350 17 Gram(s) Oral at bedtime  senna 2 Tablet(s) Oral at bedtime  sodium chloride 0.9%. 1000 milliLiter(s) (125 mL/Hr) IV Continuous <Continuous>    MEDICATIONS  (PRN):  cyclobenzaprine 5 milliGRAM(s) Oral three times a day PRN Muscle Spasm  HYDROmorphone   Tablet 4 milliGRAM(s) Oral every 3 hours PRN Severe Pain (7 - 10)  magnesium hydroxide Suspension 30 milliLiter(s) Oral daily PRN Constipation  melatonin 3 milliGRAM(s) Oral at bedtime PRN Insomnia  ondansetron Injectable 4 milliGRAM(s) IV Push every 6 hours PRN Nausea and/or Vomiting  oxyCODONE    IR 5 milliGRAM(s) Oral every 3 hours PRN Mild Pain (1 - 3)  oxyCODONE    IR 10 milliGRAM(s) Oral every 3 hours PRN Moderate Pain (4 - 6)      Physical exam: The right knee prevena vac intact. No drainage or discharge. No erythema is noted. No blistering. No ecchymosis. The calf is supple nontender. Passive range of motion is acceptable to due postoperative pain. No calf tenderness. Sensation to light touch is grossly intact distally. Motor function distally is 5/5. Extensor hallucis longus and flexor hallucis longus are intact. No foot drop. 2+ dorsalis pedis pulse. Capillary refill is less than 2 seconds. No cyanosis.  Drain in place - 130 cc    Primary Orthopedic Assessment:  • s/p RIGHT total knee replacement    Secondary  Orthopedic Assessment(s):   •     Secondary  Medical Assessment(s):   •     Plan:   • DVT prophylaxis as prescribed, including use of compression devices and ankle pumps  • Continue physical therapy  • Weightbearing as tolerated of the right lower extremity with assistance of a walker  • Incentive spirometry encouraged  • Pain control as clinically indicated  • Discharge planning – anticipated discharge is Home    VIRI DIXON    [id number]    History: 58y Female is status post right total knee arthroplasty repeat I&D on 8/19/24 after I&D with bead placement on 8/10/24 Patient is doing well and is comfortable. The patient's pain is controlled using the prescribed pain medications. The patient is participating in physical therapy. Denies nausea, vomiting, chest pain, shortness of breath, abdominal pain or fever. No new complaints.                            9.9    13.03 )-----------( 320      ( 20 Aug 2024 06:44 )             30.8     08-18    140  |  102  |  17.6  ----------------------------<  93  3.7   |  25.0  |  0.62    Ca    9.4      18 Aug 2024 09:55        MEDICATIONS  (STANDING):  acetaminophen     Tablet .. 975 milliGRAM(s) Oral every 8 hours  apixaban 2.5 milliGRAM(s) Oral every 12 hours  ceFAZolin  Injectable. 2000 milliGRAM(s) IV Push <User Schedule>  cefTRIAXone Injectable. 2000 milliGRAM(s) IV Push every 24 hours  DAPTOmycin IVPB 500 milliGRAM(s) IV Intermittent every 24 hours  ketorolac   Injectable 15 milliGRAM(s) IV Push every 6 hours  pantoprazole    Tablet 40 milliGRAM(s) Oral before breakfast  polyethylene glycol 3350 17 Gram(s) Oral at bedtime  senna 2 Tablet(s) Oral at bedtime  sodium chloride 0.9%. 1000 milliLiter(s) (125 mL/Hr) IV Continuous <Continuous>    MEDICATIONS  (PRN):  cyclobenzaprine 5 milliGRAM(s) Oral three times a day PRN Muscle Spasm  HYDROmorphone   Tablet 4 milliGRAM(s) Oral every 3 hours PRN Severe Pain (7 - 10)  magnesium hydroxide Suspension 30 milliLiter(s) Oral daily PRN Constipation  melatonin 3 milliGRAM(s) Oral at bedtime PRN Insomnia  ondansetron Injectable 4 milliGRAM(s) IV Push every 6 hours PRN Nausea and/or Vomiting  oxyCODONE    IR 5 milliGRAM(s) Oral every 3 hours PRN Mild Pain (1 - 3)  oxyCODONE    IR 10 milliGRAM(s) Oral every 3 hours PRN Moderate Pain (4 - 6)      Physical exam: The right knee prevena vac intact. No drainage or discharge. No erythema is noted. No blistering. No ecchymosis. The calf is supple nontender. Passive range of motion is acceptable to due postoperative pain. No calf tenderness. Sensation to light touch is grossly intact distally. Motor function distally is 5/5. Extensor hallucis longus and flexor hallucis longus are intact. No foot drop. 2+ dorsalis pedis pulse. Capillary refill is less than 2 seconds. No cyanosis.  Drain in place - 130 cc    Primary Orthopedic Assessment:  • s/p RIGHT total knee replacement    Secondary  Orthopedic Assessment(s):   •     Secondary  Medical Assessment(s):   •     Plan:   • DVT prophylaxis as prescribed, including use of compression devices and ankle pumps  • Continue physical therapy  • Weightbearing as tolerated of the right lower extremity with assistance of a walker  • Incentive spirometry encouraged  • Pain control as clinically indicated  • Discharge planning – anticipated discharge is Home possibly tomorrow   Maintain drain until tomorrow,  ID to see

## 2024-08-20 NOTE — PHYSICAL THERAPY INITIAL EVALUATION ADULT - PLANNED THERAPY INTERVENTIONS, PT EVAL
balance training/bed mobility training/gait training/strengthening/transfer training
bed mobility training/gait training/ROM/strengthening/transfer training

## 2024-08-20 NOTE — PROGRESS NOTE ADULT - SUBJECTIVE AND OBJECTIVE BOX
[facility]                                                INFECTIOUS DISEASES  =======================================================                   Garry Saini#   Faraz Schaffer MD#    Gosia Frey MD*                           Zahira Foster MD*   Merry Calhoun MD*   Roney Landry *           Diplomates American Board of Internal Medicine & Infectious Diseases                  # [place] Office - Appt - Tel  [phone] Fax [phone]                * [place] Office - Appt - Tel [phone] Fax [phone]                                  Hospital Consult line:  [phone]  =======================================================      [facility]-[id number]  VIRI HARTLEYGAVINO   follow up for: PJI  s/p DAIR #2 on 8/19    c/o pain right knee  lue picc placed  patient seen and examined.       I have personally reviewed the labs and data; pertinent labs and data are listed in this note; please see below.   ===================================================  REVIEW OF SYSTEMS:  CONSTITUTIONAL:  No Fever or chills  HEENT:  No diplopia or blurred vision.  No earache, sore throat or runny nose.  CARDIOVASCULAR:  No pressure, squeezing, strangling, tightness, heaviness or aching about the chest, neck, axilla or epigastrium.  RESPIRATORY:  No cough, shortness of breath  GASTROINTESTINAL:  No nausea, vomiting or diarrhea.  GENITOURINARY:  No dysuria, frequency or urgency. No Blood in urine  MUSCULOSKELETAL:  no joint aches, no muscle pain  SKIN:  No change in skin, hair or nails.  NEUROLOGIC:  No Headaches, seizures or weakness.  PSYCHIATRIC:  No disorder of thought or mood.  ENDOCRINE:  No heat or cold intolerance  HEMATOLOGICAL:  No easy bruising or bleeding.    =======================================================  Allergies    penicillin (Hives; Pruritus)    Intolerances    Antibiotics:  cefTRIAXone Injectable. 2000 milliGRAM(s) IV Push every 24 hours  DAPTOmycin IVPB 500 milliGRAM(s) IV Intermittent every 24 hours    Other medications:  acetaminophen     Tablet .. 975 milliGRAM(s) Oral every 8 hours  celecoxib 200 milliGRAM(s) Oral every 12 hours  enoxaparin Injectable 30 milliGRAM(s) SubCutaneous every 12 hours  pantoprazole    Tablet 40 milliGRAM(s) Oral before breakfast  polyethylene glycol 3350 17 Gram(s) Oral daily  senna 2 Tablet(s) Oral at bedtime  sodium chloride 0.9%. 1000 milliLiter(s) IV Continuous <Continuous>    ======================================================  Physical Exam:  ============  Vital Signs Last 24 Hrs  T(C): 36.7 (20 Aug 2024 09:41), Max: 36.7 (19 Aug 2024 22:45)  T(F): 98 (20 Aug 2024 09:41), Max: 98 (19 Aug 2024 22:45)  HR: 85 (20 Aug 2024 09:41) (72 - 98)  BP: 125/75 (20 Aug 2024 09:41) (112/61 - 137/83)  BP(mean): 85 (19 Aug 2024 21:29) (76 - 89)  RR: 18 (20 Aug 2024 09:41) (12 - 18)  SpO2: 97% (20 Aug 2024 09:41) (89% - 99%)    Parameters below as of 20 Aug 2024 09:41  Patient On (Oxygen Delivery Method): room air        General:  No acute distress.  Eye: no conjunctival pallor, no scleral icterus    Respiratory: Lungs are clear to auscultation, Respirations are non-labored.  Cardiovascular: Normal rate, Regular rhythm,  s1+s2  Gastrointestinal: Soft, Non-tender, Non-distended, Normal bowel sounds.    Musculoskeletal: RLE wrap post op dressing intact prevena drains in place  Integumentary: No rash. lue picc  Neurologic: Alert, Oriented, No focal deficits  Psychiatric: Appropriate mood & affect.  =======================================================  Labs:                            9.9    13.03 )-----------( 320      ( 20 Aug 2024 06:44 )             30.8       08-20    141  |  103  |  14.3  ----------------------------<  163<H>  4.2   |  21.0<L>  |  0.56    Ca    8.6      20 Aug 2024 06:44                Urinalysis Basic - ( 20 Aug 2024 06:44 )    Color: x / Appearance: x / SG: x / pH: x  Gluc: 163 mg/dL / Ketone: x  / Bili: x / Urobili: x   Blood: x / Protein: x / Nitrite: x   Leuk Esterase: x / RBC: x / WBC x   Sq Epi: x / Non Sq Epi: x / Bacteria: x                  CAPILLARY BLOOD GLUCOSE                    Culture - Joint (collected 08-10-24 @ 20:32)  Source: Knee R Knee Synovial Fl #3  Gram Stain (08-11-24 @ 13:56):    Few polymorphonuclear leukocytes per low power field    No organisms seen per oil power field  Preliminary Report (08-12-24 @ 09:57):    No growth    Culture - Joint (collected 08-10-24 @ 20:31)  Source: Knee R Knee Synovial Fl #2  Gram Stain (08-11-24 @ 13:56):    Few polymorphonuclear leukocytes per low power field    No organisms seen per oil power field  Preliminary Report (08-12-24 @ 09:58):    No growth    Culture - Joint (collected 08-10-24 @ 20:30)  Source: Knee R Knee Synovial Fl #1  Gram Stain (08-11-24 @ 13:56):    Few polymorphonuclear leukocytes per low power field    No organisms seen per oil power field  Preliminary Report (08-12-24 @ 10:11):    No growth

## 2024-08-20 NOTE — PROGRESS NOTE ADULT - ASSESSMENT
59 yo F with no PMHx but had Right total knee arthroplasty May 23, 2024 admitted for right knee septic arthritis.     Right knee septic arthritis   - s/p Right knee PJI DAIR with antibiotic bead placement   - s/p repeat I&D on 8/19/24   - Fluid cultures with NGTD   - ID follow up   - On Daptomycin + Ceftriaxone     Constipation   - Opiate induced. Continue senns. Dulcolax suppository as needed    Post op Anemia  - Asymptomatic  - Trend CBC     DVT ppx: per ortho

## 2024-08-21 ENCOUNTER — TRANSCRIPTION ENCOUNTER (OUTPATIENT)
Age: 59
End: 2024-08-21

## 2024-08-21 VITALS
SYSTOLIC BLOOD PRESSURE: 153 MMHG | RESPIRATION RATE: 16 BRPM | HEART RATE: 92 BPM | TEMPERATURE: 98 F | OXYGEN SATURATION: 98 % | DIASTOLIC BLOOD PRESSURE: 77 MMHG

## 2024-08-21 LAB
GRAM STN FLD: SIGNIFICANT CHANGE UP
SPECIMEN SOURCE: SIGNIFICANT CHANGE UP

## 2024-08-21 PROCEDURE — C1713: CPT

## 2024-08-21 PROCEDURE — 84100 ASSAY OF PHOSPHORUS: CPT

## 2024-08-21 PROCEDURE — 71045 X-RAY EXAM CHEST 1 VIEW: CPT

## 2024-08-21 PROCEDURE — 86900 BLOOD TYPING SEROLOGIC ABO: CPT

## 2024-08-21 PROCEDURE — 36415 COLL VENOUS BLD VENIPUNCTURE: CPT

## 2024-08-21 PROCEDURE — 87070 CULTURE OTHR SPECIMN AEROBIC: CPT

## 2024-08-21 PROCEDURE — 87205 SMEAR GRAM STAIN: CPT

## 2024-08-21 PROCEDURE — 87075 CULTR BACTERIA EXCEPT BLOOD: CPT

## 2024-08-21 PROCEDURE — C9399: CPT

## 2024-08-21 PROCEDURE — 82550 ASSAY OF CK (CPK): CPT

## 2024-08-21 PROCEDURE — 87641 MR-STAPH DNA AMP PROBE: CPT

## 2024-08-21 PROCEDURE — 85610 PROTHROMBIN TIME: CPT

## 2024-08-21 PROCEDURE — 86901 BLOOD TYPING SEROLOGIC RH(D): CPT

## 2024-08-21 PROCEDURE — 80202 ASSAY OF VANCOMYCIN: CPT

## 2024-08-21 PROCEDURE — 87640 STAPH A DNA AMP PROBE: CPT

## 2024-08-21 PROCEDURE — 96374 THER/PROPH/DIAG INJ IV PUSH: CPT

## 2024-08-21 PROCEDURE — 80053 COMPREHEN METABOLIC PANEL: CPT

## 2024-08-21 PROCEDURE — 83735 ASSAY OF MAGNESIUM: CPT

## 2024-08-21 PROCEDURE — C1776: CPT

## 2024-08-21 PROCEDURE — 85730 THROMBOPLASTIN TIME PARTIAL: CPT

## 2024-08-21 PROCEDURE — 85027 COMPLETE CBC AUTOMATED: CPT

## 2024-08-21 PROCEDURE — 87015 SPECIMEN INFECT AGNT CONCNTJ: CPT

## 2024-08-21 PROCEDURE — 73560 X-RAY EXAM OF KNEE 1 OR 2: CPT

## 2024-08-21 PROCEDURE — 85025 COMPLETE CBC W/AUTO DIFF WBC: CPT

## 2024-08-21 PROCEDURE — 86850 RBC ANTIBODY SCREEN: CPT

## 2024-08-21 PROCEDURE — 99285 EMERGENCY DEPT VISIT HI MDM: CPT

## 2024-08-21 PROCEDURE — 87999 UNLISTED MICROBIOLOGY PX: CPT

## 2024-08-21 PROCEDURE — 80048 BASIC METABOLIC PNL TOTAL CA: CPT

## 2024-08-21 PROCEDURE — 97163 PT EVAL HIGH COMPLEX 45 MIN: CPT

## 2024-08-21 PROCEDURE — 93005 ELECTROCARDIOGRAM TRACING: CPT

## 2024-08-21 RX ORDER — ACETAMINOPHEN 325 MG/1
3 TABLET ORAL
Qty: 0 | Refills: 0 | DISCHARGE
Start: 2024-08-21

## 2024-08-21 RX ORDER — PANTOPRAZOLE SODIUM 40 MG
1 TABLET, DELAYED RELEASE (ENTERIC COATED) ORAL
Qty: 42 | Refills: 0
Start: 2024-08-21

## 2024-08-21 RX ORDER — CELECOXIB 400 MG/1
1 CAPSULE ORAL
Refills: 0 | DISCHARGE

## 2024-08-21 RX ORDER — CELECOXIB 400 MG/1
1 CAPSULE ORAL
Qty: 42 | Refills: 0
Start: 2024-08-21 | End: 2024-09-10

## 2024-08-21 RX ORDER — NALOXONE HCL 1 MG/ML
4 VIAL (ML) INJECTION
Qty: 1 | Refills: 0
Start: 2024-08-21

## 2024-08-21 RX ORDER — OXYCODONE HYDROCHLORIDE 5 MG/1
1 TABLET ORAL
Qty: 28 | Refills: 0
Start: 2024-08-21 | End: 2024-08-27

## 2024-08-21 RX ORDER — APIXABAN 5 MG/1
1 TABLET, FILM COATED ORAL
Qty: 28 | Refills: 0
Start: 2024-08-21 | End: 2024-09-03

## 2024-08-21 RX ORDER — SENNA 187 MG
2 TABLET ORAL
Qty: 14 | Refills: 0
Start: 2024-08-21 | End: 2024-08-27

## 2024-08-21 RX ORDER — ASPIRIN 81 MG
1 TABLET, DELAYED RELEASE (ENTERIC COATED) ORAL
Qty: 56 | Refills: 0
Start: 2024-08-21 | End: 2024-09-17

## 2024-08-21 RX ORDER — DAPTOMYCIN 500 MG/10ML
500 INJECTION, POWDER, LYOPHILIZED, FOR SOLUTION INTRAVENOUS
Qty: 0 | Refills: 0 | DISCHARGE
Start: 2024-08-21

## 2024-08-21 RX ADMIN — ACETAMINOPHEN 975 MILLIGRAM(S): 325 TABLET ORAL at 06:49

## 2024-08-21 RX ADMIN — CELECOXIB 200 MILLIGRAM(S): 400 CAPSULE ORAL at 06:48

## 2024-08-21 RX ADMIN — CHLORHEXIDINE GLUCONATE 1 APPLICATION(S): 40 SOLUTION TOPICAL at 05:51

## 2024-08-21 RX ADMIN — Medication 40 MILLIGRAM(S): at 05:52

## 2024-08-21 RX ADMIN — APIXABAN 2.5 MILLIGRAM(S): 5 TABLET, FILM COATED ORAL at 05:48

## 2024-08-21 RX ADMIN — SODIUM CHLORIDE 125 MILLILITER(S): 9 INJECTION INTRAMUSCULAR; INTRAVENOUS; SUBCUTANEOUS at 05:48

## 2024-08-21 RX ADMIN — CELECOXIB 200 MILLIGRAM(S): 400 CAPSULE ORAL at 05:48

## 2024-08-21 RX ADMIN — ACETAMINOPHEN 975 MILLIGRAM(S): 325 TABLET ORAL at 15:30

## 2024-08-21 RX ADMIN — DAPTOMYCIN 120 MILLIGRAM(S): 500 INJECTION, POWDER, LYOPHILIZED, FOR SOLUTION INTRAVENOUS at 17:21

## 2024-08-21 RX ADMIN — CELECOXIB 200 MILLIGRAM(S): 400 CAPSULE ORAL at 17:20

## 2024-08-21 RX ADMIN — APIXABAN 2.5 MILLIGRAM(S): 5 TABLET, FILM COATED ORAL at 17:20

## 2024-08-21 RX ADMIN — Medication 2000 MILLIGRAM(S): at 17:21

## 2024-08-21 RX ADMIN — ACETAMINOPHEN 975 MILLIGRAM(S): 325 TABLET ORAL at 05:49

## 2024-08-21 NOTE — PROGRESS NOTE ADULT - SUBJECTIVE AND OBJECTIVE BOX
VIRI DIXON    [id number]    History: 58y Female is status post right total knee arthroplasty repeat I&D on 8/19/24 after I&D with bead placement on 8/10/24 Patient is doing well and is comfortable. The patient's pain is controlled using the prescribed pain medications. The patient is participating in physical therapy.  Drain output 60 overnight.  Drain clogged, flushed with normal saline flush, drain functioning properly.  Will recheck drain at noon. Denies nausea, vomiting, chest pain, shortness of breath, abdominal pain or fever. No new complaints.                            9.9    13.03 )-----------( 320      ( 20 Aug 2024 06:44 )             30.8   08-20    141  |  103  |  14.3  ----------------------------<  163<H>  4.2   |  21.0<L>  |  0.56    Ca    8.6      20 Aug 2024 06:44        Physical exam: The right knee prevena vac intact. No drainage or discharge. No erythema is noted. No blistering. No ecchymosis. The calf is supple nontender. Passive range of motion is acceptable to due postoperative pain. No calf tenderness. Sensation to light touch is grossly intact distally. Motor function distally is 5/5. Extensor hallucis longus and flexor hallucis longus are intact. No foot drop. 2+ dorsalis pedis pulse. Capillary refill is less than 2 seconds. No cyanosis.  Drain in place - 60 cc    Primary Orthopedic Assessment:  • s/p RIGHT total knee replacement        Plan:   • DVT prophylaxis as prescribed, including use of compression devices and ankle pumps  • Continue physical therapy  • Weightbearing as tolerated of the right lower extremity with assistance of a walker  • Incentive spirometry encouraged  • Pain control as clinically indicated  • Discharge planning – anticipated discharge is Home possibly tomorrow   • Recheck drain at noon.  • ID following, will reach out for final recs.

## 2024-08-21 NOTE — DISCHARGE NOTE NURSING/CASE MANAGEMENT/SOCIAL WORK - NSDPDISTO_GEN_ALL_CORE
Initiate Treatment: nystatin 100,000 unit/gram topical cream QHS PRN. Detail Level: Simple Home with home care

## 2024-08-21 NOTE — PROGRESS NOTE ADULT - REASON FOR ADMISSION
Right knee septic joint

## 2024-08-21 NOTE — DISCHARGE NOTE NURSING/CASE MANAGEMENT/SOCIAL WORK - NSDCPEFALRISK_GEN_ALL_CORE
For information on Fall & Injury Prevention, visit: [email or web address] OR  [email or web address] OR  https://www.cdc.gov/steadi/patient.html

## 2024-08-21 NOTE — PROGRESS NOTE ADULT - SUBJECTIVE AND OBJECTIVE BOX
Case management called stating patient unable to be discharged without CXR report stating good placement of PICC line.  Spoke to radiology tech who alerted radiologist to read study.

## 2024-08-21 NOTE — DISCHARGE NOTE NURSING/CASE MANAGEMENT/SOCIAL WORK - PATIENT PORTAL LINK FT
You can access the FollowMyHealth Patient Portal offered by [facility] by registering at the following website: [email or web address]. By joining [facility]’s FollowMyHealth portal, you will also be able to view your health information using other applications (apps) compatible with our system.

## 2024-08-21 NOTE — PROGRESS NOTE ADULT - PROVIDER SPECIALTY LIST ADULT
Orthopedics
Infectious Disease
Orthopedics
Hospitalist
Infectious Disease
Orthopedics
Hospitalist
Hospitalist
Infectious Disease
Infectious Disease

## 2024-08-22 ENCOUNTER — APPOINTMENT (OUTPATIENT)
Dept: ORTHOPEDIC SURGERY | Facility: CLINIC | Age: 59
End: 2024-08-22

## 2024-08-23 ENCOUNTER — NON-APPOINTMENT (OUTPATIENT)
Age: 59
End: 2024-08-23

## 2024-08-23 DIAGNOSIS — Z79.899 OTHER LONG TERM (CURRENT) DRUG THERAPY: ICD-10-CM

## 2024-08-23 RX ORDER — APIXABAN 2.5 MG/1
2.5 TABLET, FILM COATED ORAL TWICE DAILY
Refills: 0 | Status: ACTIVE | COMMUNITY
Start: 2024-08-23

## 2024-08-24 LAB
CULTURE RESULTS: SIGNIFICANT CHANGE UP
SPECIMEN SOURCE: SIGNIFICANT CHANGE UP

## 2024-08-25 LAB
CULTURE RESULTS: SIGNIFICANT CHANGE UP
CULTURE RESULTS: SIGNIFICANT CHANGE UP
SPECIMEN SOURCE: SIGNIFICANT CHANGE UP
SPECIMEN SOURCE: SIGNIFICANT CHANGE UP

## 2024-09-02 LAB
CULTURE RESULTS: SIGNIFICANT CHANGE UP
SPECIMEN SOURCE: SIGNIFICANT CHANGE UP

## 2024-09-03 ENCOUNTER — APPOINTMENT (OUTPATIENT)
Dept: INTERNAL MEDICINE | Facility: CLINIC | Age: 59
End: 2024-09-03
Payer: COMMERCIAL

## 2024-09-03 VITALS
OXYGEN SATURATION: 98 % | WEIGHT: 190 LBS | RESPIRATION RATE: 16 BRPM | TEMPERATURE: 98.6 F | DIASTOLIC BLOOD PRESSURE: 70 MMHG | BODY MASS INDEX: 31.65 KG/M2 | HEIGHT: 65 IN | SYSTOLIC BLOOD PRESSURE: 128 MMHG | HEART RATE: 97 BPM

## 2024-09-03 DIAGNOSIS — Z09 ENCOUNTER FOR FOLLOW-UP EXAMINATION AFTER COMPLETED TREATMENT FOR CONDITIONS OTHER THAN MALIGNANT NEOPLASM: ICD-10-CM

## 2024-09-03 PROCEDURE — 99496 TRANSJ CARE MGMT HIGH F2F 7D: CPT

## 2024-09-03 NOTE — PLAN
[FreeTextEntry1] : Pt will see ID 9/5.  Since all cultures have been negative aske to see what other possible causes including but not limited to hardware rejection. Follow up with Ortho next week. Pt has 4 more days of Eliquis and is continuing Celebrex for now as per Ortho. Discussed possible side effects with pt.

## 2024-09-03 NOTE — HISTORY OF PRESENT ILLNESS
[Post-hospitalization from ___ Hospital] : Post-hospitalization from [unfilled] Hospital [Admitted on: ___] : The patient was admitted on [unfilled] [Discharged on ___] : discharged on [unfilled] [Discharge Summary] : discharge summary [Pertinent Labs] : pertinent labs [Discharge Med List] : discharge medication list [Med Reconciliation] : medication reconciliation has been completed [Patient Contacted By: ____] : and contacted by [unfilled] [FreeTextEntry2] : judith was admitted to [facility] on 8/9/2024.   Patient was discharged to home on 8/12/2024.   Discharge diagnosis: Right knee septic joint.   Spoke with patient.   Hospital Course: Discharge Date 21-Aug-2024 Admission Date 09-Aug-2024 20:17 Reason for Admission Right knee septic joint Hospital Course The patient underwent a RIGHT TOTAL KNEE washout and poly exchange with antibiotic bead placement on 8/10/24 for infected right total knee hardware.The patient was IV antibiotics and followed by infectious disease team. The patient received antibiotics consistent with SCIP guidelines. The patient received LOVENOX for DVTP while awaiting second procedure. The patient underwent a repeat right knee washout with poly exchange and antibiotic bead removal on 8/19/24. The patient underwent the procedure and had no intra-operative complications. Post-operatively, the patient was seen by medicine and PT. The patient received ELIQUIS for DVTP post-operatively. The patient received pain medications per orthopedic pain management pathway and the pain was appropriately controlled. The patient did not have any post-operative medical complications. The patient received a PICC line for antibiotics. The patient was discharged in stable condition.  Med Reconciliation: Medication Reconciliation Status Admission Reconciliation is Completed Discharge Reconciliation is Completed Discharge Medications acetaminophen 325 mg oral tablet: 3 tab(s) orally every 8 hours Aspirin EC 81 mg oral delayed release tablet: 1 tab(s) orally 2 times a day MDD: 2 cefTRIAXone: 2,000 milligram(s) intravenous once a day CeleBREX 200 mg oral capsule: 1 cap(s) orally 2 times a day MDD: 2 coq 10 daily: DAPTOmycin 500 mg intravenous injection: 500 milligram(s) intravenous every 24 hours Eliquis 2.5 mg oral tablet: 1 tab(s) orally 2 times a day melatonin daily: Narcan 4 mg/0.1 mL nasal spray: 4 milligram(s) intranasally once a day use as directed ox bile daily: oxyCODONE 5 mg oral tablet: 1 tab(s) orally every 6 hours as needed for moderate pain MDD: 4 pantoprazole 40 mg oral delayed release tablet: 1 tab(s) orally once a day Senna 8.6 mg oral tablet: 2 tab(s) orally once a day (at bedtime)  Care Plan/Procedures: Discharge Diagnoses, Assessment and Plan of Treatment PRINCIPAL DISCHARGE DIAGNOSIS Diagnosis: Septic joint Assessment and Plan of Treatment: Discharge Procedures, Findings and Treatment PRINCIPAL PROCEDURE Procedure: Arthrotomy of knee with washout Findings and Treatment: Right knee incision and drainage with antibiotic bead placement Goal(s) To get better and follow your care plan as instructed. Follow Up: Care Providers for Follow up (PCP/Outpatient Provider) Remi Baptiste [facility] [address], [address] [facility] [place] [address] Phone: [phone] Fax: [phone] Follow Up Time:  [facility] [place] [facility] [address] [place] [id number] Phone: [phone] Fax: [phone] Follow Up Time: Additional Provider Info (For SysAdmin Use Only) PROVIDER:[TOKEN:[[id number]]],PROVIDER:[TOKEN:[[id number]]] Care Providers Direct Addresses (For SYSAdmin Use Only) ,[email or web address] NPI number (For SysAdmin Use Only) : [[id number]],[[id number]] Patient's Scheduled Appointments Remi Baptiste [facility] Physician [facility] ORTHOSURG [address] Scheduled Appointment: 09/10/2024  [facility] Physician [facility] ORTHOSURG [address] Scheduled Appointment: 10/02/2024 Additional Scheduled Appointments It is advisable to follow up with your primary care provider within the next 2-3 weeks to ensure your medications are appropriate and there are no underlying problems after your procedure. Discharge Diet Regular Diet - No restrictions Activity Do not drive or operate machinery, Do not make important decisions, No heavy lifting/straining, Follow Instructions Provided by your Surgical Team Additional Instructions The patient will be seen in the office between 2-3 weeks for wound check.  Infectious disease recommendations - continue with daptomycin 500mg IV daily + ceftriaxone 2g IV daily through 9/30/24 weekly CBC CMP ESR CRP CK in the absence of micro data. Follow up with infectious disease in 1 week. **Your first post-operative visit has been scheduled prior to your admission. PLEASE CONTACT OFFICE TO CONFIRM THE APPOINTMENT DATE. Staples will be removed at that time. ** Remove the ace wrap and white gauze wrap on Friday 8/23/24. Remove the Prevena dressing on Monday 8/26/24. Hold the power button until the battery quintana off, and then peel bandage off. After the Prevena is Removed apply the Silver dressing and keep on for 7 days.  ** CONTACT THE OFFICE IF THE FOLLOWING DEVELOP: - the dressing becomes soiled or saturated - you develop a fever greater that 101F - the wound becomes red or you develop blistering around the wound * Patient may shower after post-op day #3. * The patient will continue home PT consistent with total knee replacement protocol. Transition to outpatient PT will occur at the time of the first office visit. * The patient will practice knee extension exercises regularly to minimize hamstring contraction. * The patient is FULL weight bearing. *** The patient will continue ELIQUIS 2.5mg twice daily for 2 weeks (last dose to be taken on 9/2/24 at night) and then begin ASPIRIN 81mg twice daily )begin on 9/3/24) for 4 weeks for blood clot prevention. *** While on aspirin, the patient will take daily omeprazole or other similar medication to protect the stomach from irritation. * The patient will take OXYCODONE AND TYLENOL for pain control and adjust according to prescription and patient needs. Contact the office if pain increases while taking prescribed pain medications or related concerns develop. * Celebrex will be taken twice daily for 3 weeks for pain control and prevention of excessive bone growth. Additional prescription may be requested at your office follow-up visit. * The patient will take Senna S while taking oxycodone to prevent narcotic associated constipation. Additionally, increase water intake (drink at least 8 glasses of water daily) and try adding fiber to the diet by eating fruits, vegetables and foods that are rich in grains. If constipation is experienced, contact the medical/primary care provider to discuss further treatment options. * To avoid injury at home: - continue use of rolling walker until cleared by physical therapist - have family or friend remove all throw rug or objects in hallways that may present a trip hazard. - if you experience any dizziness or medical concerns, call your medical doctor or 911. * The implant may activate metal detection devices.  **Continue IV antibiotics as per infectious disease**  Quality Measures: Does the patient have difficulty running errands alone like visiting a doctors office or shopping? Yes Does the patient have difficulty climbing stairs? Yes Cognition: The patient has No difficulties Patient Condition Stable Hospice Patient No Core Measure Site No Does the patient have a principal diagnosis of ischemic stroke, hemorrhagic stroke, or TIA? No Does the patient have a principal diagnosis of Acute Myocardial Infarction? No Has the patient had a Percutaneous Coronary Intervention? No Home Health: Discharged with Home Health Care Services? Yes Face-To-Face Contact As certified below, I, or a nurse practitioner or physician assistant working with me, had a face-to-face encounter that meets the physician face-to-face encounter requirements. Need for Skilled Services Observation and assessment Rehabilitation services Teaching and training Based on the above findings, the following intermittent skilled services are medically necessary home health services: Nursing Physical therapy Home Bound Status Ataxic gait Fall risk Patient Needs Assistance to Leave Residence... Requires an assistive device to leave home: Walker Assistive device required to leave home due to: total knee arthroplasty Attending Certification My signature below certifies that the above stated patient is homebound and upon completion of the Fac.   Discharge Review: The discharge summary was reviewed. The discharge medication list was reviewed. Medication reconciliation was performed during this call.   Current Health Status: Health care provider reviewed patient's current health status/signs/symptoms during this call. Health care provider addressed questions and concerns regarding patient's health care plan.   Additional Information: Pt states she is doing well overall s/p surgery, she denies painm, ambulating as tolerated, moving bowels. Infusion nurse in place for abx via PICC line, visiting nurse in place for dressing changes. Would like to follow up with SANJAY Polk (has follow up with ID on 9/13 and Dr. Baptiste on 9/10/24. HFU was scheduled by admin team on 9/3/24.   Home Care Needs: Home care not needed . Visiting nurse and infusion nurse in place.   Patient Education: Instructions were given for patient to seek medical advice for any change in condition. I have spent 10 minutes with the patient on the telephone.  Current Meds       Medication Review: The provider in the hospital did not stop or change the patient's medication(s).  The provider in the hospital prescribed new medication(s). Aspirin EC 81 mg oral delayed release tablet: 1 tab(s) orally 2 times a day  MDD: 2  cefTRIAXone: 2,000 milligram(s) intravenous once a day  CeleBREX 200 mg oral capsule: 1 cap(s) orally 2 times a day MDD: 2  coq 10 daily:  DAPTOmycin 500 mg intravenous injection: 500 milligram(s) intravenous every 24  hours  Eliquis 2.5 mg oral tablet: 1 tab(s) orally 2 times a day  melatonin daily:  Narcan 4 mg/0.1 mL nasal spray: 4 milligram(s) intranasally once a day use as  directed  ox bile daily:  oxyCODONE 5 mg oral tablet: 1 tab(s) orally every 6 hours as needed for  moderate pain MDD: 4  pantoprazole 40 mg oral delayed release tablet: 1 tab(s) orally once a day  Senna 8.6 mg oral tablet: 2 tab(s) orally once a day (at bedtime).  Patient has all prescribed medications.  Patient takes medication(s) as prescribed. Patient does not have any barriers to medication adherence. Reviewed medication list for presence of high-risk medications. This includes the following: opioids and blood thinners.  Plan       1. Hospital Follow-Up Patient/representative verbalized importance of medical follow up with PCP/other specialist after hospital discharge. Follow-up appointment scheduled: 9/3/2024

## 2024-09-04 LAB
CULTURE RESULTS: SIGNIFICANT CHANGE UP
SPECIMEN SOURCE: SIGNIFICANT CHANGE UP

## 2024-09-05 ENCOUNTER — APPOINTMENT (OUTPATIENT)
Dept: INFECTIOUS DISEASE | Facility: CLINIC | Age: 59
End: 2024-09-05
Payer: COMMERCIAL

## 2024-09-05 VITALS
SYSTOLIC BLOOD PRESSURE: 142 MMHG | DIASTOLIC BLOOD PRESSURE: 84 MMHG | HEIGHT: 65 IN | OXYGEN SATURATION: 98 % | HEART RATE: 91 BPM | TEMPERATURE: 98 F | RESPIRATION RATE: 16 BRPM | BODY MASS INDEX: 31.65 KG/M2 | WEIGHT: 190 LBS

## 2024-09-05 DIAGNOSIS — R74.8 ABNORMAL LEVELS OF OTHER SERUM ENZYMES: ICD-10-CM

## 2024-09-05 DIAGNOSIS — Z96.651 PRESENCE OF RIGHT ARTIFICIAL KNEE JOINT: ICD-10-CM

## 2024-09-05 DIAGNOSIS — T84.50XD INFECTION AND INFLAMMATORY REACTION DUE TO UNSPECIFIED INTERNAL JOINT PROSTHESIS, SUBSEQUENT ENCOUNTER: ICD-10-CM

## 2024-09-05 PROCEDURE — 99496 TRANSJ CARE MGMT HIGH F2F 7D: CPT

## 2024-09-06 ENCOUNTER — NON-APPOINTMENT (OUTPATIENT)
Age: 59
End: 2024-09-06

## 2024-09-09 LAB — CK SERPL-CCNC: 544 U/L

## 2024-09-10 ENCOUNTER — APPOINTMENT (OUTPATIENT)
Dept: ORTHOPEDIC SURGERY | Facility: CLINIC | Age: 59
End: 2024-09-10
Payer: COMMERCIAL

## 2024-09-10 VITALS
HEIGHT: 65 IN | DIASTOLIC BLOOD PRESSURE: 86 MMHG | WEIGHT: 190 LBS | SYSTOLIC BLOOD PRESSURE: 150 MMHG | BODY MASS INDEX: 31.65 KG/M2

## 2024-09-10 DIAGNOSIS — Z47.1 AFTERCARE FOLLOWING JOINT REPLACEMENT SURGERY: ICD-10-CM

## 2024-09-10 DIAGNOSIS — Z96.652 AFTERCARE FOLLOWING JOINT REPLACEMENT SURGERY: ICD-10-CM

## 2024-09-10 PROBLEM — T84.50XD INFECTION OF PROSTHETIC JOINT, SUBSEQUENT ENCOUNTER: Status: ACTIVE | Noted: 2024-09-10

## 2024-09-10 PROCEDURE — 99024 POSTOP FOLLOW-UP VISIT: CPT

## 2024-09-10 PROCEDURE — 73562 X-RAY EXAM OF KNEE 3: CPT | Mod: RT

## 2024-09-10 RX ORDER — PANTOPRAZOLE 40 MG/1
40 TABLET, DELAYED RELEASE ORAL
Qty: 21 | Refills: 0 | Status: ACTIVE | COMMUNITY
Start: 2024-09-10 | End: 1900-01-01

## 2024-09-10 NOTE — HISTORY OF PRESENT ILLNESS
[de-identified] :  S/P Right total knee replacement debridement and implant retention (DAIR) with exchange of polyethylene and removal of antibiotic cement beads. DOS :08/19/24  S/P Right robotic assisted TKR with BRYAN, DOS: 5/16/24    [de-identified] :  VIRI DIXON is doing well 3 week s/p right total knee replacement debridement and implant retention with poly exchange x 2. She is participating in home physical therapy, as well as a home exercise program daily. Her pain level is controlled. She is taking aspirin 81 mg twice a day for DVT prophylaxis. She is on IV antibiotics and following up with ID. [de-identified] :  Exam of the right knee shows a well healing incision, 0 to 100 degrees of flexion measured with a goniometer.  Small effusion.  Swelling and warmth of the knee is much improved compared to prior to the debridements 5/5 motor strength bilaterally distally. Sensation intact distally. [de-identified] :  X-ray: 3 views of the right knee demonstrate a total knee replacement in good alignment with a well centered patella, without evidence of loosening or subsidence, and no fracture. [de-identified] :  The patient is doing very well 3 weeks after right total knee replacement debridement and implant retention x 2 with poly exchange for PJI. The patient will be transitioned to outpatient physical therapy and a prescription was given for that. The patient will continue aspirin 81 mg twice per day for DVT prophylaxis for the next 3 weeks. The importance of physical therapy and achieving full knee extension as well as progressing knee flexion was reinforced. Her staples were removed today.  Will continue on IV antibiotics for approximately 3 more weeks.  She will follow-up with infectious disease to monitor ESR and CRP levels.  So far it appears that her knee clinically is much improved compared to before the debridements.  Unfortunately her cultures as well as PCR's never grew bacteria so she is on broad-spectrum antibiotic coverage.

## 2024-09-11 NOTE — ASSESSMENT
[FreeTextEntry1] : 58F s/p Right total knee arthroplasty on 5/23/2024 hospitalized at [facility] 8/9 - 8/21 for double DAIR for right knee PJI. Seen in ortho office on 8/8 for posterior knee swelling and pain. Underwent arthrocentesis - RBC 84,000, nucleated cell 30,798 (N 72%), culture neg, joint bacterial PCR neg. Underwent DAIR #1 with antibiotics bead placement on 8/10. Surgical cultures neg. Underwent DAIR #2 on 8/19/24, antibiotic beads removed, no pus encountered, repeat joint PCR neg, cultures negative. Ultimately discharged on empiric treatment with daptomycin 500mg IV daily + ceftriaxone 2g IV daily through 9/30/24 in the absence of micro data.   Treatment course has been complicated by elevated CPK to 875. She is asymptomatic without muscle aches, choluria and her renal function remains normal. Will recheck CPK today. If increasing, will switch to linezolid (patient no longer taking opiates, tramadol, SSRIs or other medications that could interact with linezolid).   All questions and concerns addressed with patient. Follow up within 4 weeks.

## 2024-09-11 NOTE — ASSESSMENT
[FreeTextEntry1] : 58F s/p Right total knee arthroplasty on 5/23/2024 hospitalized at [facility] 8/9 - 8/21 for double DAIR for right knee PJI. Seen in ortho office on 8/8 for posterior knee swelling and pain. Underwent arthrocentesis - RBC 84,000, nucleated cell 30,798 (N 72%), culture neg, joint bacterial PCR neg. Underwent DAIR #1 with antibiotics bead placement on 8/10. Surgical cultures neg. Underwent DAIR #2 on 8/19/24, antibiotic beads removed, no pus encountered, repeat joint PCR neg, cultures negative. Ultimately discharged on empiric treatment with daptomycin 500mg IV daily + ceftriaxone 2g IV daily through 9/30/24 in the absence of micro data.   Treatment course has been complicated by elevated CPK to 875. She is asymptomatic without muscle aches, choluria and her renal function remains normal. Will recheck CPK today. If increasing, will switch to linezolid (patient no longer taking opiates, tramadol, SSRIs or other medications that could interact with linezolid).   All questions and concerns addressed with patient. Follow up within 4 weeks.

## 2024-09-11 NOTE — HISTORY OF PRESENT ILLNESS
[FreeTextEntry1] : 58F s/p Right total knee arthroplasty on 5/23/2024 hospitalized at [facility] 8/9 - 8/21 for double DAIR for right knee PJI. Seen in ortho office on 8/8 for posterior knee swelling and pain. Underwent arthrocentesis - RBC 84,000, nucleated cell 30,798 (N 72%), culture neg, joint bacterial PCR neg. Underwent DAIR #1 with antibiotics bead placement on 8/10. Surgical cultures neg. Underwent DAIR #2 on 8/19/24, antibiotic beads removed, no pus encountered, repeat joint PCR neg, cultures negative.  Ultimately discharged on empiric treatment with daptomycin 500mg IV daily + ceftriaxone 2g IV daily through 9/30/24 in the absence of micro data.   9/5/2024 HFU - seen in the ED on 8/25 for syncopal events (probably med-related). No issues with home abx infusion. No fever, chills, nausea, vomiting, diarrhea. Feels tired. Wound healing well. Ambulating. Participating in PT. Pain well controlled with acetaminophen. No longer taking tramadol, or additional pain killers. Of note, 9/3 CPK elevated at 875, renal function stable. No muscle aches or choluria.

## 2024-09-11 NOTE — PHYSICAL EXAM
[General Appearance - Alert] : alert [General Appearance - In No Acute Distress] : in no acute distress [General Appearance - Well Nourished] : well nourished [General Appearance - Well-Appearing] : healthy appearing [Sclera] : the sclera and conjunctiva were normal [Examination Of The Oral Cavity] : the lips and gums were normal [Oropharynx] : the oropharynx was normal with no thrush [Neck Appearance] : the appearance of the neck was normal [] : no respiratory distress [Respiration, Rhythm And Depth] : normal respiratory rhythm and effort [Exaggerated Use Of Accessory Muscles For Inspiration] : no accessory muscle use [Auscultation Breath Sounds / Voice Sounds] : lungs were clear to auscultation bilaterally [Heart Rate And Rhythm] : heart rate was normal and rhythm regular [Heart Sounds] : normal S1 and S2 [Murmurs] : no murmurs [Skin Color & Pigmentation] : normal skin color and pigmentation [Oriented To Time, Place, And Person] : oriented to person, place, and time [Affect] : the affect was normal [FreeTextEntry1] : right knee - well healed surgical incision. No swelling, ertyhema, drainage, tenderness or dehiscence

## 2024-09-11 NOTE — HISTORY OF PRESENT ILLNESS
[FreeTextEntry1] : 58F s/p Right total knee arthroplasty on 5/23/2024 hospitalized at [facility] 8/9 - 8/21 for double DAIR for right knee PJI. Seen in ortho office on 8/8 for posterior knee swelling and pain. Underwent arthrocentesis - RBC 84,000, nucleated cell 30,798 (N 72%), culture neg, joint bacterial PCR neg. Underwent DAIR #1 with antibiotics bead placement on 8/10. Surgical cultures neg. Underwent DAIR #2 on 8/19/24, antibiotic beads removed, no pus encountered, repeat joint PCR neg, cultures negative.  Ultimately discharged on empiric treatment with daptomycin 500mg IV daily + ceftriaxone 2g IV daily through 9/30/24 in the absence of micro data.   9/5/2024 HFU - seen in the ED on 8/25 for syncopal events (probably med-related). No issues with home abx infusion. No fever, chills, nausea, vomiting, diarrhea. Feels tired. Wound healing well. Ambulating. Participating in PT. Pain well controlled with acetaminophen. No longer taking tramadol, or additional pain killers. Of note, 9/3 CPK elevated at 875, renal function stable. No muscle aches or choluria.

## 2024-09-19 DIAGNOSIS — R19.7 DIARRHEA, UNSPECIFIED: ICD-10-CM

## 2024-10-02 ENCOUNTER — APPOINTMENT (OUTPATIENT)
Dept: ORTHOPEDIC SURGERY | Facility: CLINIC | Age: 59
End: 2024-10-02
Payer: COMMERCIAL

## 2024-10-02 VITALS — HEIGHT: 65 IN | WEIGHT: 190 LBS | BODY MASS INDEX: 31.65 KG/M2

## 2024-10-02 DIAGNOSIS — Z96.651 PRESENCE OF RIGHT ARTIFICIAL KNEE JOINT: ICD-10-CM

## 2024-10-02 DIAGNOSIS — Z47.1 AFTERCARE FOLLOWING JOINT REPLACEMENT SURGERY: ICD-10-CM

## 2024-10-02 PROCEDURE — 99024 POSTOP FOLLOW-UP VISIT: CPT

## 2024-10-02 PROCEDURE — 73562 X-RAY EXAM OF KNEE 3: CPT | Mod: 26,RT

## 2024-10-02 NOTE — HISTORY OF PRESENT ILLNESS
[de-identified] :  S/P Right total knee replacement debridement and implant retention (DAIR) with exchange of polyethylene and removal of antibiotic cement beads. DOS :08/19/24  S/P Right robotic assisted TKR with BRYAN, DOS: 5/16/24    [de-identified] : VIRI DIXON is doing well 6 week s/p right total knee replacement debridement and implant retention with poly exchange x 2. She is participating in outpatient physical therapy, as well as a home exercise program daily. Her pain level is controlled. She is taking aspirin 81 mg twice a day for DVT prophylaxis. She is no off of IV antibiotics as of two days ago and due to follow up with ID tomorrow.  [de-identified] :  Exam of the right knee shows a well healed incision, with the exception of two prominent sutures that were removed. ROM 0 to 110 degrees of flexion measured with a goniometer.  Small effusion. 5/5 motor strength bilaterally distally. Sensation intact distally. [de-identified] :  X-ray: 3 views of the right knee demonstrate a total knee replacement in good alignment with a well centered patella, without evidence of loosening or subsidence, and no fracture. [de-identified] : The patient is doing very well 6 weeks after right total knee replacement debridement and implant retention x 2 with poly exchange for PJI. The patient will conintue outpatient physical therapy and a prescription was given for that. The patient will continue aspirin 81 mg twice per day for DVT prophylaxis for the next 3 weeks. The importance of physical therapy and achieving full knee extension as well as progressing knee flexion was reinforced. She will follow-up with infectious disease to monitor ESR and CRP levels.  So far it appears that her knee clinically is much improved compared to before the debridement.  Unfortunately her cultures as well as PCR's never grew bacteria so she was on broad-spectrum antibiotic coverage.

## 2024-10-03 ENCOUNTER — APPOINTMENT (OUTPATIENT)
Dept: INFECTIOUS DISEASE | Facility: CLINIC | Age: 59
End: 2024-10-03

## 2024-10-03 VITALS
TEMPERATURE: 97.9 F | HEART RATE: 83 BPM | BODY MASS INDEX: 31.32 KG/M2 | OXYGEN SATURATION: 98 % | SYSTOLIC BLOOD PRESSURE: 134 MMHG | DIASTOLIC BLOOD PRESSURE: 86 MMHG | WEIGHT: 188 LBS | HEIGHT: 65 IN | RESPIRATION RATE: 16 BRPM

## 2024-10-03 DIAGNOSIS — T84.50XD INFECTION AND INFLAMMATORY REACTION DUE TO UNSPECIFIED INTERNAL JOINT PROSTHESIS, SUBSEQUENT ENCOUNTER: ICD-10-CM

## 2024-10-03 PROCEDURE — 99214 OFFICE O/P EST MOD 30 MIN: CPT

## 2024-10-03 RX ORDER — CEPHALEXIN 500 MG/1
500 TABLET ORAL
Qty: 60 | Refills: 2 | Status: ACTIVE | COMMUNITY
Start: 2024-10-03 | End: 1900-01-01

## 2024-10-03 NOTE — HISTORY OF PRESENT ILLNESS
[FreeTextEntry1] : 58F s/p Right total knee arthroplasty on 5/23/2024 hospitalized at [facility] 8/9 - 8/21 for double DAIR for right knee PJI. Seen in ortho office on 8/8 for posterior knee swelling and pain. Underwent arthrocentesis - RBC 84,000, nucleated cell 30,798 (N 72%), culture neg, joint bacterial PCR neg. Underwent DAIR #1 with antibiotics bead placement on 8/10. Surgical cultures neg. Underwent DAIR #2 on 8/19/24, antibiotic beads removed, no pus encountered, repeat joint PCR neg, cultures negative.  Ultimately discharged on empiric treatment with daptomycin 500mg IV daily + ceftriaxone 2g IV daily through 9/30/24 in the absence of micro data.   9/5/2024 HFU - seen in the ED on 8/25 for syncopal events (probably med-related). No issues with home abx infusion. No fever, chills, nausea, vomiting, diarrhea. Feels tired. Wound healing well. Ambulating. Participating in PT. Pain well controlled with acetaminophen. No longer taking tramadol, or additional pain killers. Of note, 9/3 CPK elevated at 875, renal function stable. No muscle aches or choluria.   10/3/2024 FU - finished abx

## 2024-10-08 ENCOUNTER — APPOINTMENT (OUTPATIENT)
Dept: GASTROENTEROLOGY | Facility: CLINIC | Age: 59
End: 2024-10-08
Payer: COMMERCIAL

## 2024-10-08 VITALS
OXYGEN SATURATION: 98 % | DIASTOLIC BLOOD PRESSURE: 83 MMHG | RESPIRATION RATE: 16 BRPM | WEIGHT: 191 LBS | SYSTOLIC BLOOD PRESSURE: 119 MMHG | BODY MASS INDEX: 31.82 KG/M2 | HEIGHT: 65 IN | TEMPERATURE: 97.5 F | HEART RATE: 107 BPM

## 2024-10-08 DIAGNOSIS — K52.9 NONINFECTIVE GASTROENTERITIS AND COLITIS, UNSPECIFIED: ICD-10-CM

## 2024-10-08 PROCEDURE — 99215 OFFICE O/P EST HI 40 MIN: CPT

## 2024-10-18 ENCOUNTER — LABORATORY RESULT (OUTPATIENT)
Age: 59
End: 2024-10-18

## 2024-10-18 ENCOUNTER — APPOINTMENT (OUTPATIENT)
Dept: ORTHOPEDIC SURGERY | Facility: CLINIC | Age: 59
End: 2024-10-18
Payer: COMMERCIAL

## 2024-10-18 VITALS — BODY MASS INDEX: 31.82 KG/M2 | HEIGHT: 65 IN | WEIGHT: 191 LBS

## 2024-10-18 DIAGNOSIS — Z96.651 PRESENCE OF RIGHT ARTIFICIAL KNEE JOINT: ICD-10-CM

## 2024-10-18 DIAGNOSIS — Z47.1 AFTERCARE FOLLOWING JOINT REPLACEMENT SURGERY: ICD-10-CM

## 2024-10-18 PROCEDURE — 73562 X-RAY EXAM OF KNEE 3: CPT | Mod: RT

## 2024-10-18 PROCEDURE — 20610 DRAIN/INJ JOINT/BURSA W/O US: CPT | Mod: 58,RT

## 2024-10-18 PROCEDURE — 99024 POSTOP FOLLOW-UP VISIT: CPT

## 2024-10-22 ENCOUNTER — NON-APPOINTMENT (OUTPATIENT)
Age: 59
End: 2024-10-22

## 2024-10-22 LAB
B PERT IGG+IGM PNL SER: ABNORMAL
COLOR FLD: NORMAL
FLUID INTAKE SUBSTANCE CLASS: NORMAL
JOINT PCR PANEL: NOT DETECTED
JOINT PCR SOURCE: NORMAL
LYMPHOCYTES # FLD MANUAL: 3 %
MONOS+MACROS NFR FLD MANUAL: 7 %
NEUTS SEG # FLD MANUAL: 90 %
RBC # FLD MANUAL: ABNORMAL /UL
TOTAL CELLS COUNTED FLD: NORMAL /UL
TUBE TYPE: NORMAL

## 2024-10-22 RX ORDER — CELECOXIB 200 MG/1
200 CAPSULE ORAL
Qty: 42 | Refills: 0 | Status: ACTIVE | COMMUNITY
Start: 2024-10-22 | End: 1900-01-01

## 2024-11-01 ENCOUNTER — APPOINTMENT (OUTPATIENT)
Dept: ORTHOPEDIC SURGERY | Facility: CLINIC | Age: 59
End: 2024-11-01
Payer: COMMERCIAL

## 2024-11-01 VITALS — HEIGHT: 65 IN | WEIGHT: 191 LBS | BODY MASS INDEX: 31.82 KG/M2

## 2024-11-01 DIAGNOSIS — Z96.652 AFTERCARE FOLLOWING JOINT REPLACEMENT SURGERY: ICD-10-CM

## 2024-11-01 DIAGNOSIS — Z47.1 AFTERCARE FOLLOWING JOINT REPLACEMENT SURGERY: ICD-10-CM

## 2024-11-01 DIAGNOSIS — Z96.651 PRESENCE OF RIGHT ARTIFICIAL KNEE JOINT: ICD-10-CM

## 2024-11-01 PROCEDURE — 99024 POSTOP FOLLOW-UP VISIT: CPT

## 2024-11-01 PROCEDURE — 20610 DRAIN/INJ JOINT/BURSA W/O US: CPT | Mod: 79,RT

## 2024-11-07 ENCOUNTER — APPOINTMENT (OUTPATIENT)
Dept: INFECTIOUS DISEASE | Facility: CLINIC | Age: 59
End: 2024-11-07
Payer: COMMERCIAL

## 2024-11-07 VITALS
BODY MASS INDEX: 31.82 KG/M2 | WEIGHT: 191 LBS | HEIGHT: 65 IN | SYSTOLIC BLOOD PRESSURE: 120 MMHG | DIASTOLIC BLOOD PRESSURE: 68 MMHG

## 2024-11-07 DIAGNOSIS — T84.50XD INFECTION AND INFLAMMATORY REACTION DUE TO UNSPECIFIED INTERNAL JOINT PROSTHESIS, SUBSEQUENT ENCOUNTER: ICD-10-CM

## 2024-11-07 PROCEDURE — 99215 OFFICE O/P EST HI 40 MIN: CPT

## 2024-11-14 ENCOUNTER — APPOINTMENT (OUTPATIENT)
Dept: ORTHOPEDIC SURGERY | Facility: CLINIC | Age: 59
End: 2024-11-14
Payer: COMMERCIAL

## 2024-11-14 VITALS
WEIGHT: 191 LBS | DIASTOLIC BLOOD PRESSURE: 82 MMHG | BODY MASS INDEX: 31.82 KG/M2 | HEART RATE: 76 BPM | SYSTOLIC BLOOD PRESSURE: 152 MMHG | HEIGHT: 65 IN

## 2024-11-14 DIAGNOSIS — Z96.651 PRESENCE OF RIGHT ARTIFICIAL KNEE JOINT: ICD-10-CM

## 2024-11-14 PROCEDURE — 99213 OFFICE O/P EST LOW 20 MIN: CPT | Mod: 24

## 2024-11-14 PROCEDURE — G2211 COMPLEX E/M VISIT ADD ON: CPT | Mod: NC

## 2024-11-14 RX ORDER — CELECOXIB 200 MG/1
200 CAPSULE ORAL
Qty: 42 | Refills: 0 | Status: ACTIVE | COMMUNITY
Start: 2024-11-14 | End: 1900-01-01

## 2024-11-19 RX ORDER — CELECOXIB 200 MG/1
200 CAPSULE ORAL DAILY
Qty: 90 | Refills: 0 | Status: ACTIVE | COMMUNITY
Start: 2024-11-19 | End: 1900-01-01

## 2024-11-27 ENCOUNTER — APPOINTMENT (OUTPATIENT)
Dept: CARDIOLOGY | Facility: CLINIC | Age: 59
End: 2024-11-27
Payer: COMMERCIAL

## 2024-11-27 ENCOUNTER — NON-APPOINTMENT (OUTPATIENT)
Age: 59
End: 2024-11-27

## 2024-11-27 VITALS
BODY MASS INDEX: 32.12 KG/M2 | SYSTOLIC BLOOD PRESSURE: 123 MMHG | HEART RATE: 78 BPM | DIASTOLIC BLOOD PRESSURE: 80 MMHG | OXYGEN SATURATION: 98 % | WEIGHT: 193 LBS

## 2024-11-27 DIAGNOSIS — R94.31 ABNORMAL ELECTROCARDIOGRAM [ECG] [EKG]: ICD-10-CM

## 2024-11-27 DIAGNOSIS — E78.5 HYPERLIPIDEMIA, UNSPECIFIED: ICD-10-CM

## 2024-11-27 PROCEDURE — G2211 COMPLEX E/M VISIT ADD ON: CPT | Mod: NC

## 2024-11-27 PROCEDURE — 99204 OFFICE O/P NEW MOD 45 MIN: CPT

## 2024-11-27 PROCEDURE — 93000 ELECTROCARDIOGRAM COMPLETE: CPT

## 2024-12-10 ENCOUNTER — APPOINTMENT (OUTPATIENT)
Dept: CARDIOLOGY | Facility: CLINIC | Age: 59
End: 2024-12-10
Payer: COMMERCIAL

## 2024-12-10 ENCOUNTER — APPOINTMENT (OUTPATIENT)
Dept: CT IMAGING | Facility: CLINIC | Age: 59
End: 2024-12-10
Payer: SELF-PAY

## 2024-12-10 PROCEDURE — 75571 CT HRT W/O DYE W/CA TEST: CPT

## 2024-12-10 PROCEDURE — 93306 TTE W/DOPPLER COMPLETE: CPT

## 2024-12-13 ENCOUNTER — APPOINTMENT (OUTPATIENT)
Dept: ORTHOPEDIC SURGERY | Facility: CLINIC | Age: 59
End: 2024-12-13
Payer: COMMERCIAL

## 2024-12-13 VITALS — WEIGHT: 193 LBS | HEIGHT: 65 IN | BODY MASS INDEX: 32.15 KG/M2

## 2024-12-13 PROCEDURE — 73562 X-RAY EXAM OF KNEE 3: CPT | Mod: RT

## 2024-12-13 PROCEDURE — 99215 OFFICE O/P EST HI 40 MIN: CPT

## 2024-12-13 PROCEDURE — G2211 COMPLEX E/M VISIT ADD ON: CPT | Mod: NC

## 2024-12-16 ENCOUNTER — OUTPATIENT (OUTPATIENT)
Dept: OUTPATIENT SERVICES | Facility: HOSPITAL | Age: 59
LOS: 1 days | End: 2024-12-16
Payer: COMMERCIAL

## 2024-12-16 VITALS
OXYGEN SATURATION: 99 % | SYSTOLIC BLOOD PRESSURE: 122 MMHG | WEIGHT: 191.8 LBS | HEIGHT: 65 IN | RESPIRATION RATE: 18 BRPM | HEART RATE: 76 BPM | DIASTOLIC BLOOD PRESSURE: 68 MMHG | TEMPERATURE: 99 F

## 2024-12-16 DIAGNOSIS — Z98.890 OTHER SPECIFIED POSTPROCEDURAL STATES: Chronic | ICD-10-CM

## 2024-12-16 DIAGNOSIS — T84.012A BROKEN INTERNAL RIGHT KNEE PROSTHESIS, INITIAL ENCOUNTER: ICD-10-CM

## 2024-12-16 DIAGNOSIS — Z90.49 ACQUIRED ABSENCE OF OTHER SPECIFIED PARTS OF DIGESTIVE TRACT: Chronic | ICD-10-CM

## 2024-12-16 DIAGNOSIS — Z13.89 ENCOUNTER FOR SCREENING FOR OTHER DISORDER: ICD-10-CM

## 2024-12-16 DIAGNOSIS — Z01.818 ENCOUNTER FOR OTHER PREPROCEDURAL EXAMINATION: ICD-10-CM

## 2024-12-16 DIAGNOSIS — Z96.651 PRESENCE OF RIGHT ARTIFICIAL KNEE JOINT: Chronic | ICD-10-CM

## 2024-12-16 DIAGNOSIS — Z29.9 ENCOUNTER FOR PROPHYLACTIC MEASURES, UNSPECIFIED: ICD-10-CM

## 2024-12-16 LAB
A1C WITH ESTIMATED AVERAGE GLUCOSE RESULT: 5.8 % — HIGH (ref 4–5.6)
ANION GAP SERPL CALC-SCNC: 12 MMOL/L — SIGNIFICANT CHANGE UP (ref 5–17)
APTT BLD: 28.6 SEC — SIGNIFICANT CHANGE UP (ref 24.5–35.6)
BASOPHILS # BLD AUTO: 0.06 K/UL — SIGNIFICANT CHANGE UP (ref 0–0.2)
BASOPHILS NFR BLD AUTO: 0.6 % — SIGNIFICANT CHANGE UP (ref 0–2)
BLD GP AB SCN SERPL QL: SIGNIFICANT CHANGE UP
BUN SERPL-MCNC: 15.7 MG/DL — SIGNIFICANT CHANGE UP (ref 8–20)
CALCIUM SERPL-MCNC: 9.4 MG/DL — SIGNIFICANT CHANGE UP (ref 8.4–10.5)
CHLORIDE SERPL-SCNC: 103 MMOL/L — SIGNIFICANT CHANGE UP (ref 96–108)
CO2 SERPL-SCNC: 25 MMOL/L — SIGNIFICANT CHANGE UP (ref 22–29)
CREAT SERPL-MCNC: 0.59 MG/DL — SIGNIFICANT CHANGE UP (ref 0.5–1.3)
EGFR: 104 ML/MIN/1.73M2 — SIGNIFICANT CHANGE UP
EOSINOPHIL # BLD AUTO: 0.35 K/UL — SIGNIFICANT CHANGE UP (ref 0–0.5)
EOSINOPHIL NFR BLD AUTO: 3.7 % — SIGNIFICANT CHANGE UP (ref 0–6)
ESTIMATED AVERAGE GLUCOSE: 120 MG/DL — HIGH (ref 68–114)
GLUCOSE SERPL-MCNC: 130 MG/DL — HIGH (ref 70–99)
HCT VFR BLD CALC: 36.3 % — SIGNIFICANT CHANGE UP (ref 34.5–45)
HGB BLD-MCNC: 11.6 G/DL — SIGNIFICANT CHANGE UP (ref 11.5–15.5)
IMM GRANULOCYTES NFR BLD AUTO: 0.1 % — SIGNIFICANT CHANGE UP (ref 0–0.9)
INR BLD: 1.07 RATIO — SIGNIFICANT CHANGE UP (ref 0.85–1.16)
LYMPHOCYTES # BLD AUTO: 2.6 K/UL — SIGNIFICANT CHANGE UP (ref 1–3.3)
LYMPHOCYTES # BLD AUTO: 27.4 % — SIGNIFICANT CHANGE UP (ref 13–44)
MCHC RBC-ENTMCNC: 25.4 PG — LOW (ref 27–34)
MCHC RBC-ENTMCNC: 32 G/DL — SIGNIFICANT CHANGE UP (ref 32–36)
MCV RBC AUTO: 79.4 FL — LOW (ref 80–100)
MONOCYTES # BLD AUTO: 0.79 K/UL — SIGNIFICANT CHANGE UP (ref 0–0.9)
MONOCYTES NFR BLD AUTO: 8.3 % — SIGNIFICANT CHANGE UP (ref 2–14)
MRSA PCR RESULT.: SIGNIFICANT CHANGE UP
NEUTROPHILS # BLD AUTO: 5.69 K/UL — SIGNIFICANT CHANGE UP (ref 1.8–7.4)
NEUTROPHILS NFR BLD AUTO: 59.9 % — SIGNIFICANT CHANGE UP (ref 43–77)
PLATELET # BLD AUTO: 286 K/UL — SIGNIFICANT CHANGE UP (ref 150–400)
POTASSIUM SERPL-MCNC: 4 MMOL/L — SIGNIFICANT CHANGE UP (ref 3.5–5.3)
POTASSIUM SERPL-SCNC: 4 MMOL/L — SIGNIFICANT CHANGE UP (ref 3.5–5.3)
PROTHROM AB SERPL-ACNC: 12.1 SEC — SIGNIFICANT CHANGE UP (ref 9.9–13.4)
RBC # BLD: 4.57 M/UL — SIGNIFICANT CHANGE UP (ref 3.8–5.2)
RBC # FLD: 15.2 % — HIGH (ref 10.3–14.5)
S AUREUS DNA NOSE QL NAA+PROBE: SIGNIFICANT CHANGE UP
SODIUM SERPL-SCNC: 140 MMOL/L — SIGNIFICANT CHANGE UP (ref 135–145)
WBC # BLD: 9.5 K/UL — SIGNIFICANT CHANGE UP (ref 3.8–10.5)
WBC # FLD AUTO: 9.5 K/UL — SIGNIFICANT CHANGE UP (ref 3.8–10.5)

## 2024-12-16 PROCEDURE — 86900 BLOOD TYPING SEROLOGIC ABO: CPT

## 2024-12-16 PROCEDURE — G0463: CPT

## 2024-12-16 PROCEDURE — 86901 BLOOD TYPING SEROLOGIC RH(D): CPT

## 2024-12-16 PROCEDURE — 93010 ELECTROCARDIOGRAM REPORT: CPT

## 2024-12-16 PROCEDURE — 83036 HEMOGLOBIN GLYCOSYLATED A1C: CPT

## 2024-12-16 PROCEDURE — 86850 RBC ANTIBODY SCREEN: CPT

## 2024-12-16 PROCEDURE — 85610 PROTHROMBIN TIME: CPT

## 2024-12-16 PROCEDURE — 87641 MR-STAPH DNA AMP PROBE: CPT

## 2024-12-16 PROCEDURE — 93005 ELECTROCARDIOGRAM TRACING: CPT

## 2024-12-16 PROCEDURE — 80048 BASIC METABOLIC PNL TOTAL CA: CPT

## 2024-12-16 PROCEDURE — 87640 STAPH A DNA AMP PROBE: CPT

## 2024-12-16 PROCEDURE — 85025 COMPLETE CBC W/AUTO DIFF WBC: CPT

## 2024-12-16 PROCEDURE — 85730 THROMBOPLASTIN TIME PARTIAL: CPT

## 2024-12-16 PROCEDURE — 36415 COLL VENOUS BLD VENIPUNCTURE: CPT

## 2024-12-16 RX ORDER — ACETAMINOPHEN, DIPHENHYDRAMINE HCL, PHENYLEPHRINE HCL 325; 25; 5 MG/1; MG/1; MG/1
1 TABLET ORAL
Refills: 0 | DISCHARGE

## 2024-12-16 NOTE — H&P PST ADULT - MUSCULOSKELETAL
details… decreased ROM due to pain/joint swelling/strength 5/5 bilateral upper extremities/abnormal gait

## 2024-12-16 NOTE — H&P PST ADULT - ASSESSMENT
This is a pleasant  59 year old  female in NAD  presenting today for PST, PMH includes OA. Patient c/o right knee pain and is s/p right total knee replacement on 2024. States since replacement she has had "multiple issues". Reports on Aug 10 she had wash out of joint due to infection and surgery to remove parts 24, she had PICC placed and was treated with IV antibiotics x 6 weeks. States in more pain now then before initial surgery. Describes pain as constant, pulling and twisting. Current pain level 7/10 and max pain level 10/10. Reports swelling and buckling of right knee, denies falls or use of cane. Patient is frustrated due to pain and inability to "get around", patient states she lost her job because shes been out for so long. She is now scheduled for revision right total knee replacement on 24 with Dr. Baptiste pending medical optimization.  Cardiology note and Echo to be obtained.          Labs, EKG and MRSA/MSSA performed. Written and verbal instructions provided. Patient educated on surgical scrub, preadmission instructions, clearance and day of procedure medications, verbalizes understanding. Patient was given information on joint class, joint book provided, ERP protocol reviewed with patient, MSSA/MRSA swabbed in PST, results pending.  Patient instructed to stop vitamins/supplements/herbal medications/ASA/NSAIDS for one week prior to surgery and discuss with PMD, verbalized understanding.  Patient verbalized understanding of instructions and was given the opportunity to ask questions. Out patient medications reviewed and verified with patient.        CAPRINI SCORE    AGE RELATED RISK FACTORS                                                             [X ] Age 41-60 years                                            (1 Point)  [ ] Age: 61-74 years                                           (2 Points)                 [ ] Age= 75 years                                                (3 Points)             DISEASE RELATED RISK FACTORS                                                       [ X] Edema in the lower extremities                 (1 Point)                     [X ] Varicose veins                                               (1 Point)                                 [X ] BMI > 25 Kg/m2                                            (1 Point)                                  [ ] Serious infection (ie PNA)                            (1 Point)                     [ ] Lung disease ( COPD, Emphysema)            (1 Point)                                                                          [ ] Acute myocardial infarction                         (1 Point)                  [ ] Congestive heart failure (in the previous month)  (1 Point)         [ ] Inflammatory bowel disease                            (1 Point)                  [ ] Central venous access, PICC or Port               (2 points)       (within the last month)                                                                [ ] Stroke (in the previous month)                        (5 Points)    [ ] Previous or present malignancy                       (2 points)                                                                                                                                                         HEMATOLOGY RELATED FACTORS                                                         [ ] Prior episodes of VTE                                     (3 Points)                     [ ] Positive family history for VTE                      (3 Points)                  [ ] Prothrombin 98478 A                                     (3 Points)                     [ ] Factor V Leiden                                                (3 Points)                        [ ] Lupus anticoagulants                                      (3 Points)                                                           [ ] Anticardiolipin antibodies                              (3 Points)                                                       [ ] High homocysteine in the blood                   (3 Points)                                             [ ] Other congenital or acquired thrombophilia      (3 Points)                                                [ ] Heparin induced thrombocytopenia                  (3 Points)                                        MOBILITY RELATED FACTORS  [ ] Bed rest                                                         (1 Point)  [ ] Plaster cast                                                    (2 points)  [ ] Bed bound for more than 72 hours           (2 Points)    GENDER SPECIFIC FACTORS  [ ] Pregnancy or had a baby within the last month   (1 Point)  [ ] Post-partum < 6 weeks                                   (1 Point)  [ ] Hormonal therapy  or oral contraception   (1 Point)  [ ] History of pregnancy complications              (1 point)  [ ] Unexplained or recurrent              (1 Point)    OTHER RISK FACTORS                                           (1 Point)  [ ] BMI >40, smoking, diabetes requiring insulin, chemotherapy  blood transfusions and length of surgery over 2 hours    SURGERY RELATED RISK FACTORS  [ ]  Section within the last month     (1 Point)  [ ] Minor surgery                                                  (1 Point)  [ ] Arthroscopic surgery                                       (2 Points)  [ ] Planned major surgery lasting more            (2 Points)      than 45 minutes     [ X] Elective hip or knee joint replacement       (5 points)       surgery                                                TRAUMA RELATED RISK FACTORS  [ ] Fracture of the hip, pelvis, or leg                       (5 Points)  [ ] Spinal cord injury resulting in paralysis             (5 points)       (in the previous month)    [ ] Paralysis  (less than 1 month)                             (5 Points)  [ ] Multiple Trauma within 1 month                        (5 Points)    Total Score [   9     ]    Caprini Score 0-2: Low Risk, NO VTE prophylaxis required for most patients, encourage ambulation  Caprini Score 3-6: Moderate Risk , pharmacologic VTE prophylaxis is indicated for most patients (in the absence of contraindications)  Caprini Score Greater than or =7: High risk, pharmocologic VTE prophylaxis indicated for most patients (in the absence of contraindications)        OPIOID RISK TOOL    JONATHAN EACH BOX THAT APPLIES AND ADD TOTALS AT THE END    FAMILY HISTORY OF SUBSTANCE ABUSE                 FEMALE         MALE                                                Alcohol                             [  ]1 pt          [  ]3pts                                               Illegal Durgs                     [  ]2 pts        [  ]3pts                                               Rx Drugs                           [  ]4 pts        [  ]4 pts    PERSONAL HISTORY OF SUBSTANCE ABUSE                                                                                          Alcohol                             [  ]3 pts       [  ]3 pts                                               Illegal Drugs                     [  ]4 pts        [  ]4 pts                                               Rx Drugs                           [  ]5 pts        [  ]5 pts    AGE BETWEEN 16-45 YEARS                                      [  ]1 pt         [  ]1 pt    HISTORY OF PREADOLESCENT   SEXUAL ABUSE                                                             [  ]3 pts        [  ]0pts    PSYCHOLOGICAL DISEASE                     ADD, OCD, Bipolar, Schizophrenia        [  ]2 pts         [  ]2 pts                      Depression                                               [  ]1 pt           [  ]1 pt           SCORING TOTAL   (add numbers and type here)              (**0*)                                     A score of 3 or lower indicated LOW risk for future opioid abuse  A score of 4 to 7 indicated moderate risk for future opioid abuse  A score of 8 or higher indicates a high risk for opioid abuse

## 2024-12-16 NOTE — H&P PST ADULT - NSICDXPASTSURGICALHX_GEN_ALL_CORE_FT
PAST SURGICAL HISTORY:  H/O arthroscopy of knee     H/O total knee replacement, right     S/P cholecystectomy     S/P shoulder surgery     S/P wrist surgery

## 2024-12-16 NOTE — H&P PST ADULT - PROBLEM SELECTOR PLAN 1
Scheduled for revision right total knee replacement on 12/23/24 with Dr. Baptiste  pending medical optimization.  Patient see Dr. Menezes cardiologist will obtain last cardiac note.

## 2024-12-16 NOTE — H&P PST ADULT - HISTORY OF PRESENT ILLNESS
s/p RTK 5/2024  with infection 8/10 removal of   8/19 replace with IV PICC x 6 weeks   Allergy testing to metal negative   Describes pain constant pulling, twisting pain   pain 7/10 max pain level 10/10  Patient is a 59 year old  female presenting today for PST, PMH includes OA. Patient c/o right knee pain and is s/p right total knee replacement on 5/16/2024. States since replacement she has had "multiple issues". Reports on Aug 10 she had wash out of joint due to infection and surgery to remove parts 8/19/24, she had PICC placed and was treated with IV antibiotics x 6 weeks. States in more pain now then before initial surgery. Describes pain as constant, pulling and twisting. Current pain level 7/10 and max pain level 10/10. Reports swelling and buckling of right knee, denies falls or use of cane. Patient is frustrated due to pain and inability to "get around", patient states she lost her job because shes been out for so long. She is now scheduled for revision right total knee replacement on 12/23/24 with Dr. Baptiste pending medical optimization.  Cardiology note and Echo to be obtained.

## 2024-12-16 NOTE — H&P PST ADULT - PROBLEM SELECTOR PLAN 3
CAP score 9 patient is High risk, SCDs ordered for day of procedure.  Surgical team to assess need for  pharmacological and mechanical measures for VTE prophylaxis

## 2024-12-18 ENCOUNTER — APPOINTMENT (OUTPATIENT)
Dept: ULTRASOUND IMAGING | Facility: CLINIC | Age: 59
End: 2024-12-18
Payer: COMMERCIAL

## 2024-12-18 ENCOUNTER — APPOINTMENT (OUTPATIENT)
Dept: MAMMOGRAPHY | Facility: CLINIC | Age: 59
End: 2024-12-18
Payer: COMMERCIAL

## 2024-12-18 PROCEDURE — 76641 ULTRASOUND BREAST COMPLETE: CPT | Mod: 50

## 2024-12-18 PROCEDURE — 77063 BREAST TOMOSYNTHESIS BI: CPT

## 2024-12-18 PROCEDURE — 77067 SCR MAMMO BI INCL CAD: CPT

## 2024-12-19 ENCOUNTER — APPOINTMENT (OUTPATIENT)
Dept: INTERNAL MEDICINE | Facility: CLINIC | Age: 59
End: 2024-12-19
Payer: COMMERCIAL

## 2024-12-19 VITALS
HEART RATE: 93 BPM | HEIGHT: 65 IN | TEMPERATURE: 97.9 F | BODY MASS INDEX: 32.15 KG/M2 | RESPIRATION RATE: 12 BRPM | SYSTOLIC BLOOD PRESSURE: 110 MMHG | DIASTOLIC BLOOD PRESSURE: 78 MMHG | WEIGHT: 193 LBS | OXYGEN SATURATION: 98 %

## 2024-12-19 DIAGNOSIS — Z01.818 ENCOUNTER FOR OTHER PREPROCEDURAL EXAMINATION: ICD-10-CM

## 2024-12-19 PROCEDURE — 99214 OFFICE O/P EST MOD 30 MIN: CPT

## 2024-12-20 RX ORDER — POVIDONE IODINE USP, 10% W/W 10 MG/ML
1 SWAB TOPICAL ONCE
Refills: 0 | Status: COMPLETED | OUTPATIENT
Start: 2024-12-23 | End: 2024-12-23

## 2024-12-23 ENCOUNTER — TRANSCRIPTION ENCOUNTER (OUTPATIENT)
Age: 59
End: 2024-12-23

## 2024-12-23 ENCOUNTER — RESULT REVIEW (OUTPATIENT)
Age: 59
End: 2024-12-23

## 2024-12-23 ENCOUNTER — APPOINTMENT (OUTPATIENT)
Dept: ORTHOPEDIC SURGERY | Facility: HOSPITAL | Age: 59
End: 2024-12-23

## 2024-12-23 ENCOUNTER — INPATIENT (INPATIENT)
Facility: HOSPITAL | Age: 59
LOS: 0 days | Discharge: HOME HEALTH | DRG: 468 | End: 2024-12-24
Attending: ORTHOPAEDIC SURGERY | Admitting: ORTHOPAEDIC SURGERY
Payer: COMMERCIAL

## 2024-12-23 VITALS
TEMPERATURE: 98 F | SYSTOLIC BLOOD PRESSURE: 146 MMHG | DIASTOLIC BLOOD PRESSURE: 90 MMHG | WEIGHT: 191.8 LBS | HEIGHT: 65 IN | OXYGEN SATURATION: 100 % | RESPIRATION RATE: 18 BRPM | HEART RATE: 76 BPM

## 2024-12-23 DIAGNOSIS — Z90.49 ACQUIRED ABSENCE OF OTHER SPECIFIED PARTS OF DIGESTIVE TRACT: Chronic | ICD-10-CM

## 2024-12-23 DIAGNOSIS — Z98.890 OTHER SPECIFIED POSTPROCEDURAL STATES: Chronic | ICD-10-CM

## 2024-12-23 DIAGNOSIS — T84.012A BROKEN INTERNAL RIGHT KNEE PROSTHESIS, INITIAL ENCOUNTER: ICD-10-CM

## 2024-12-23 DIAGNOSIS — Z96.651 PRESENCE OF RIGHT ARTIFICIAL KNEE JOINT: Chronic | ICD-10-CM

## 2024-12-23 PROCEDURE — 88300 SURGICAL PATH GROSS: CPT | Mod: 26

## 2024-12-23 PROCEDURE — 27487 REVISE/REPLACE KNEE JOINT: CPT | Mod: RT

## 2024-12-23 PROCEDURE — 27487 REVISE/REPLACE KNEE JOINT: CPT | Mod: AS,RT

## 2024-12-23 PROCEDURE — 73560 X-RAY EXAM OF KNEE 1 OR 2: CPT | Mod: 26,RT

## 2024-12-23 DEVICE — CEMENT PALACOS R: Type: IMPLANTABLE DEVICE | Site: RIGHT | Status: FUNCTIONAL

## 2024-12-23 DEVICE — IMPLANTABLE DEVICE: Type: IMPLANTABLE DEVICE | Site: RIGHT | Status: FUNCTIONAL

## 2024-12-23 DEVICE — PIN TROCAR GEN 1/8 X 3IN: Type: IMPLANTABLE DEVICE | Site: RIGHT | Status: FUNCTIONAL

## 2024-12-23 DEVICE — ZIMMER/NEXGEN SMOOTH PIN 3.2X75MM: Type: IMPLANTABLE DEVICE | Site: RIGHT | Status: FUNCTIONAL

## 2024-12-23 DEVICE — PIN TROCAR GEN 1/8 X 5IN: Type: IMPLANTABLE DEVICE | Site: RIGHT | Status: FUNCTIONAL

## 2024-12-23 RX ORDER — CELECOXIB 200 MG
200 CAPSULE ORAL EVERY 12 HOURS
Refills: 0 | Status: DISCONTINUED | OUTPATIENT
Start: 2024-12-25 | End: 2024-12-24

## 2024-12-23 RX ORDER — ONDANSETRON 4 MG/1
4 TABLET ORAL EVERY 6 HOURS
Refills: 0 | Status: DISCONTINUED | OUTPATIENT
Start: 2024-12-23 | End: 2024-12-24

## 2024-12-23 RX ORDER — MAG HYDROX/ALUMINUM HYD/SIMETH 200-200-20
30 SUSPENSION, ORAL (FINAL DOSE FORM) ORAL
Refills: 0 | Status: DISCONTINUED | OUTPATIENT
Start: 2024-12-23 | End: 2024-12-24

## 2024-12-23 RX ORDER — PANTOPRAZOLE 40 MG/1
40 TABLET, DELAYED RELEASE ORAL
Refills: 0 | Status: DISCONTINUED | OUTPATIENT
Start: 2024-12-23 | End: 2024-12-24

## 2024-12-23 RX ORDER — SODIUM CHLORIDE 9 MG/ML
3 INJECTION, SOLUTION INTRAMUSCULAR; INTRAVENOUS; SUBCUTANEOUS EVERY 8 HOURS
Refills: 0 | Status: DISCONTINUED | OUTPATIENT
Start: 2024-12-23 | End: 2024-12-23

## 2024-12-23 RX ORDER — APREPITANT 40 MG/1
40 CAPSULE ORAL ONCE
Refills: 0 | Status: COMPLETED | OUTPATIENT
Start: 2024-12-23 | End: 2024-12-23

## 2024-12-23 RX ORDER — TRANEXAMIC ACID 650 MG/1
1000 TABLET ORAL ONCE
Refills: 0 | Status: DISCONTINUED | OUTPATIENT
Start: 2024-12-23 | End: 2024-12-23

## 2024-12-23 RX ORDER — OXYCODONE HCL 15 MG
10 TABLET ORAL
Refills: 0 | Status: DISCONTINUED | OUTPATIENT
Start: 2024-12-23 | End: 2024-12-24

## 2024-12-23 RX ORDER — CEFAZOLIN SODIUM 1 G
2000 VIAL (EA) INJECTION ONCE
Refills: 0 | Status: DISCONTINUED | OUTPATIENT
Start: 2024-12-23 | End: 2024-12-23

## 2024-12-23 RX ORDER — BISACODYL 5 MG
10 TABLET, DELAYED RELEASE (ENTERIC COATED) ORAL ONCE
Refills: 0 | Status: DISCONTINUED | OUTPATIENT
Start: 2024-12-25 | End: 2024-12-24

## 2024-12-23 RX ORDER — SENNOSIDES 8.6 MG/1
2 TABLET, FILM COATED ORAL AT BEDTIME
Refills: 0 | Status: DISCONTINUED | OUTPATIENT
Start: 2024-12-23 | End: 2024-12-24

## 2024-12-23 RX ORDER — POLYETHYLENE GLYCOL 3350 17 G/DOSE
17 POWDER (GRAM) ORAL AT BEDTIME
Refills: 0 | Status: DISCONTINUED | OUTPATIENT
Start: 2024-12-23 | End: 2024-12-24

## 2024-12-23 RX ORDER — KETOROLAC TROMETHAMINE 30 MG/ML
30 INJECTION INTRAMUSCULAR; INTRAVENOUS ONCE
Refills: 0 | Status: DISCONTINUED | OUTPATIENT
Start: 2024-12-23 | End: 2024-12-23

## 2024-12-23 RX ORDER — MAGNESIUM HYDROXIDE 400 MG/5ML
30 SUSPENSION, ORAL (FINAL DOSE FORM) ORAL DAILY
Refills: 0 | Status: DISCONTINUED | OUTPATIENT
Start: 2024-12-23 | End: 2024-12-24

## 2024-12-23 RX ORDER — SODIUM CHLORIDE 9 MG/ML
1000 INJECTION, SOLUTION INTRAMUSCULAR; INTRAVENOUS; SUBCUTANEOUS
Refills: 0 | Status: DISCONTINUED | OUTPATIENT
Start: 2024-12-23 | End: 2024-12-24

## 2024-12-23 RX ORDER — CEFAZOLIN SODIUM 1 G
2000 VIAL (EA) INJECTION
Refills: 0 | Status: DISCONTINUED | OUTPATIENT
Start: 2024-12-23 | End: 2024-12-23

## 2024-12-23 RX ORDER — HYDROMORPHONE HCL 4 MG
4 TABLET ORAL EVERY 4 HOURS
Refills: 0 | Status: DISCONTINUED | OUTPATIENT
Start: 2024-12-23 | End: 2024-12-24

## 2024-12-23 RX ORDER — ACETAMINOPHEN 80 MG/.8ML
1000 SOLUTION/ DROPS ORAL ONCE
Refills: 0 | Status: COMPLETED | OUTPATIENT
Start: 2024-12-23 | End: 2024-12-24

## 2024-12-23 RX ORDER — APIXABAN 5 MG/1
2.5 TABLET, FILM COATED ORAL
Refills: 0 | Status: DISCONTINUED | OUTPATIENT
Start: 2024-12-24 | End: 2024-12-24

## 2024-12-23 RX ORDER — GINKGO BILOBA 40 MG
5 CAPSULE ORAL AT BEDTIME
Refills: 0 | Status: DISCONTINUED | OUTPATIENT
Start: 2024-12-23 | End: 2024-12-24

## 2024-12-23 RX ORDER — ONDANSETRON 4 MG/1
4 TABLET ORAL ONCE
Refills: 0 | Status: DISCONTINUED | OUTPATIENT
Start: 2024-12-23 | End: 2024-12-23

## 2024-12-23 RX ORDER — OXYCODONE HCL 15 MG
5 TABLET ORAL
Refills: 0 | Status: DISCONTINUED | OUTPATIENT
Start: 2024-12-23 | End: 2024-12-24

## 2024-12-23 RX ORDER — CEFAZOLIN SODIUM 1 G
2000 VIAL (EA) INJECTION
Refills: 0 | Status: COMPLETED | OUTPATIENT
Start: 2024-12-23 | End: 2024-12-24

## 2024-12-23 RX ORDER — KETOROLAC TROMETHAMINE 30 MG/ML
15 INJECTION INTRAMUSCULAR; INTRAVENOUS EVERY 6 HOURS
Refills: 0 | Status: COMPLETED | OUTPATIENT
Start: 2024-12-23 | End: 2024-12-24

## 2024-12-23 RX ORDER — ACETAMINOPHEN 80 MG/.8ML
975 SOLUTION/ DROPS ORAL EVERY 8 HOURS
Refills: 0 | Status: DISCONTINUED | OUTPATIENT
Start: 2024-12-23 | End: 2024-12-24

## 2024-12-23 RX ORDER — ACETAMINOPHEN 80 MG/.8ML
975 SOLUTION/ DROPS ORAL ONCE
Refills: 0 | Status: COMPLETED | OUTPATIENT
Start: 2024-12-23 | End: 2024-12-23

## 2024-12-23 RX ORDER — HYDROMORPHONE HCL 4 MG
0.5 TABLET ORAL
Refills: 0 | Status: DISCONTINUED | OUTPATIENT
Start: 2024-12-23 | End: 2024-12-23

## 2024-12-23 RX ORDER — ACETAMINOPHEN 80 MG/.8ML
1000 SOLUTION/ DROPS ORAL ONCE
Refills: 0 | Status: DISCONTINUED | OUTPATIENT
Start: 2024-12-23 | End: 2024-12-23

## 2024-12-23 RX ORDER — FENTANYL 75 UG/H
25 PATCH, EXTENDED RELEASE TRANSDERMAL
Refills: 0 | Status: DISCONTINUED | OUTPATIENT
Start: 2024-12-23 | End: 2024-12-23

## 2024-12-23 RX ADMIN — FENTANYL 25 MICROGRAM(S): 75 PATCH, EXTENDED RELEASE TRANSDERMAL at 22:30

## 2024-12-23 RX ADMIN — FENTANYL 25 MICROGRAM(S): 75 PATCH, EXTENDED RELEASE TRANSDERMAL at 23:15

## 2024-12-23 RX ADMIN — ACETAMINOPHEN 975 MILLIGRAM(S): 80 SOLUTION/ DROPS ORAL at 23:57

## 2024-12-23 RX ADMIN — KETOROLAC TROMETHAMINE 15 MILLIGRAM(S): 30 INJECTION INTRAMUSCULAR; INTRAVENOUS at 23:57

## 2024-12-23 RX ADMIN — APREPITANT 40 MILLIGRAM(S): 40 CAPSULE ORAL at 13:41

## 2024-12-23 RX ADMIN — POVIDONE IODINE USP, 10% W/W 1 APPLICATION(S): 10 SWAB TOPICAL at 14:33

## 2024-12-23 RX ADMIN — FENTANYL 25 MICROGRAM(S): 75 PATCH, EXTENDED RELEASE TRANSDERMAL at 23:35

## 2024-12-23 RX ADMIN — FENTANYL 25 MICROGRAM(S): 75 PATCH, EXTENDED RELEASE TRANSDERMAL at 22:45

## 2024-12-23 RX ADMIN — ACETAMINOPHEN 975 MILLIGRAM(S): 80 SOLUTION/ DROPS ORAL at 13:41

## 2024-12-23 RX ADMIN — FENTANYL 25 MICROGRAM(S): 75 PATCH, EXTENDED RELEASE TRANSDERMAL at 23:00

## 2024-12-23 RX ADMIN — ONDANSETRON 4 MILLIGRAM(S): 4 TABLET ORAL at 23:57

## 2024-12-23 NOTE — DISCHARGE NOTE PROVIDER - NSDCFUADDINST_GEN_ALL_CORE_FT
The patient will be seen in the office between 2-3 weeks for wound check.   **Your first post-operative visit has been scheduled prior to your admission. PLEASE CONTACT OFFICE TO CONFIRM THE APPOINTMENT DATE. Sutures/Staples/Tape will be removed at that time.  **  The silver based dressing is to be removed 7 days from the date of surgery.   ** CONTACT THE OFFICE IF THE FOLLOWING DEVELOP:  - the dressing becomes soiled or saturated  - you develop a fever greater that 101F  - the wound becomes red or you develop blistering around the wound  * Patient may shower after post-op day #3.   * The patient will continue home PT consistent with  total knee replacement protocol. Transition to outpatient PT will occur at the time of the first office visit.   * The patient will practice knee extension exercises regularly to minimize hamstring contraction.   * The patient is FULL weight bearing.  * The patient will continue ASPIRIN for 6 weeks after surgery for blood clot prevention.  * While on aspirin, the patient will take daily omeprazole or other similar medication to protect the stomach from irritation.   * The patient will take OXYCODONE for pain control and adjust according to prescription and patient needs. The patient will also take TYLENOL 975mg every 8 hours for pain control. Contact the office if pain increases while taking prescribed pain medications or related concerns develop.  * Celebrex will be taken twice daily for 3 weeks for pain control and prevention of excessive bone growth. Additional prescription may be requested at your office follow-up visit.   * The patient will take Senna S while taking oxycodone to prevent narcotic associated constipation.  Additionally, increase water intake (drink at least 8 glasses of water daily) and try adding fiber to the diet by eating fruits, vegetables and foods that are rich in grains. If constipation is experienced, contact the medical/primary care provider to discuss further treatment options.  * To avoid injury at home:  - continue use of rolling walker until cleared by physical therapist  - have family or friend remove all throw rug or objects in hallways that may present a trip hazard.  - if you experience any dizziness or medical concerns, call your medical doctor or  911.  * The implant may activate metal detection devices.  The patient will be seen in the office in 1 week for wound check.   **Your first post-operative visit has been scheduled prior to your admission. PLEASE CONTACT OFFICE TO CONFIRM THE APPOINTMENT DATE. Sutures/Staples/Tape will be removed at that time.  **  The PREVENA based dressing is to be removed 7 days from the date of surgery.   ** CONTACT THE OFFICE IF THE FOLLOWING DEVELOP:  - the dressing becomes soiled or saturated  - you develop a fever greater that 101F  - the wound becomes red or you develop blistering around the wound  * DO NOT SHOWER UNTIL SEEN IN OFFICE  * The patient will continue home PT consistent with  total knee replacement protocol. Transition to outpatient PT will occur at the time of the first office visit.   * The patient will practice knee extension exercises regularly to minimize hamstring contraction.   * The patient is FULL weight bearing.  * The patient will continue ELIQUIS 2.5 mg bid for 2 weeks post op for blood clot prevention, and then begin ASA 81 mg bid for 4 more weeks for blood clot prevention.   * While on aspirin, the patient will take daily omeprazole or other similar medication to protect the stomach from irritation.   * The patient will take OXYCODONE as needed for pain control and adjust according to prescription and patient needs. The patient will also take TYLENOL 975mg every 8 hours for pain control. Contact the office if pain increases while taking prescribed pain medications or related concerns develop.  * Celebrex will be taken twice daily for 3 weeks for pain control and prevention of excessive bone growth. Additional prescription may be requested at your office follow-up visit.   * The patient will take Senna S while taking oxycodone to prevent narcotic associated constipation.  Additionally, increase water intake (drink at least 8 glasses of water daily) and try adding fiber to the diet by eating fruits, vegetables and foods that are rich in grains. If constipation is experienced, contact the medical/primary care provider to discuss further treatment options.  * To avoid injury at home:  - continue use of rolling walker until cleared by physical therapist  - have family or friend remove all throw rug or objects in hallways that may present a trip hazard.  - if you experience any dizziness or medical concerns, call your medical doctor or  911.  * The implant may activate metal detection devices.  The patient will be seen in the office in on 1/3/24. **PLEASE CONTACT OFFICE TO SCHEDULE/CONFIRM THE APPOINTMENT DATE.  **  The PREVENA based dressing is to be removed 7 days from the date of surgery. Please replace with mepilex dressing.  ** CONTACT THE OFFICE IF THE FOLLOWING DEVELOP:  - the dressing becomes soiled or saturated  - you develop a fever greater that 101F  - the wound becomes red or you develop blistering around the wound  * DO NOT SHOWER UNTIL SEEN IN OFFICE  * The patient will continue home PT consistent with  total knee replacement protocol. Transition to outpatient PT will occur at the time of the first office visit.   * The patient will practice knee extension exercises regularly to minimize hamstring contraction.   * The patient is FULL weight bearing.  * The patient will continue ELIQUIS 2.5 mg bid for 2 weeks post op for blood clot prevention, and then begin ASA 81 mg bid for 4 more weeks for blood clot prevention.   * While on aspirin, the patient will take daily omeprazole or other similar medication to protect the stomach from irritation.   * The patient will take OXYCODONE as needed for pain control and adjust according to prescription and patient needs. The patient will also take TYLENOL 975mg every 8 hours for pain control. Contact the office if pain increases while taking prescribed pain medications or related concerns develop.  * Celebrex will be taken twice daily for 3 weeks for pain control and prevention of excessive bone growth. Additional prescription may be requested at your office follow-up visit.   * The patient will take Senna S while taking oxycodone to prevent narcotic associated constipation.  Additionally, increase water intake (drink at least 8 glasses of water daily) and try adding fiber to the diet by eating fruits, vegetables and foods that are rich in grains. If constipation is experienced, contact the medical/primary care provider to discuss further treatment options.  * To avoid injury at home:  - continue use of rolling walker until cleared by physical therapist  - have family or friend remove all throw rug or objects in hallways that may present a trip hazard.  - if you experience any dizziness or medical concerns, call your medical doctor or  911.  * The implant may activate metal detection devices.     **Continue Bactrim x 7 days post op**** The patient will be seen in the office in on 1/3/25. **PLEASE CONTACT OFFICE TO SCHEDULE/CONFIRM THE APPOINTMENT DATE.  **  Continue PREVENA dressing as applied- will be replaced with MEPILEX dressing in office on 1/3/25.  ** CONTACT THE OFFICE IF THE FOLLOWING DEVELOP:  - the dressing becomes soiled or saturated  - you develop a fever greater that 101F  - the wound becomes red or you develop blistering around the wound  * DO NOT SHOWER UNTIL SEEN IN OFFICE  * The patient will continue home PT consistent with  total knee replacement protocol. Transition to outpatient PT will occur at the time of the first office visit.   * The patient will practice knee extension exercises regularly to minimize hamstring contraction.   * The patient is FULL weight bearing.  * The patient will continue ELIQUIS 2.5 mg bid for 2 weeks post op for blood clot prevention, and then begin ASA 81 mg bid for 4 more weeks for blood clot prevention.   * While on aspirin, the patient will take daily omeprazole or other similar medication to protect the stomach from irritation.   * The patient will take OXYCODONE as needed for pain control and adjust according to prescription and patient needs. The patient will also take TYLENOL 975mg every 8 hours for pain control. Contact the office if pain increases while taking prescribed pain medications or related concerns develop.  * Celebrex will be taken twice daily for 3 weeks for pain control and prevention of excessive bone growth. Additional prescription may be requested at your office follow-up visit.   * The patient will take Senna S while taking oxycodone to prevent narcotic associated constipation.  Additionally, increase water intake (drink at least 8 glasses of water daily) and try adding fiber to the diet by eating fruits, vegetables and foods that are rich in grains. If constipation is experienced, contact the medical/primary care provider to discuss further treatment options.  * To avoid injury at home:  - continue use of rolling walker until cleared by physical therapist  - have family or friend remove all throw rug or objects in hallways that may present a trip hazard.  - if you experience any dizziness or medical concerns, call your medical doctor or  911.  * The implant may activate metal detection devices.     **Continue Bactrim x 7 days post op****

## 2024-12-23 NOTE — DISCHARGE NOTE PROVIDER - NSCORESITESY/N_GEN_A_CORE_RD
-- Message is from the [facility]--    Reason for Call: Patient mother calling back to see what Dr. Vaughan office had called her about this morning. I did advise her that the medication was called in. Please call and leave a message if she does not answer.    Caller Information       Type Contact Phone    04/12/2019 11:32 AM Phone (Incoming) HEMA SOSA (Mother) [phone] (H)          Alternative phone number:     Turnaround time given to caller:   \"This message will be sent to [state Provider's name]. The clinical team will fulfill your request as soon as they review your message.\"    
Spoke to om, f/u set for May 9 @8140  
No

## 2024-12-23 NOTE — DISCHARGE NOTE PROVIDER - NSDCFUSCHEDAPPT_GEN_ALL_CORE_FT
Remi Baptiste  [facility]  ORTHOSURG [address]  Scheduled Appointment: 01/17/2025    Talha Esteves  [facility]  GASTRO [address]  Scheduled Appointment: 01/23/2025    [facility]  ORTHOSURG [address]  Scheduled Appointment: 02/05/2025    Victor Hugo Menezes  [facility]  CARDIOLOGY [address]   Scheduled Appointment: 03/05/2025

## 2024-12-23 NOTE — BRIEF OPERATIVE NOTE - NSICDXBRIEFPREOP_GEN_ALL_CORE_FT
PRE-OP DIAGNOSIS:  Broken internal right knee prosthesis, initial encounter 23-Dec-2024 21:54:57  Jose Armando Cha

## 2024-12-23 NOTE — DISCHARGE NOTE PROVIDER - HOSPITAL COURSE
The patient underwent a RIGHT REVISION TOTAL KNEE REPLACEMENT on 12/23/24 with Dr. Baptiste. The patient received antibiotics consistent with SCIP guidelines. The patient underwent the procedure and had no intra-operative complications. Post-operatively, the patient was seen by medicine and PT. The patient received ASPIRIN for DVTP. The patient received pain medications per orthopedic pain management pathway and the pain was appropriately controlled. The patient did not have any post-operative medical complications. The patient was discharged in stable condition. The patient underwent a RIGHT REVISION TOTAL KNEE REPLACEMENT on 12/23/24 with Dr. Baptiste. The patient received antibiotics consistent with SCIP guidelines. The patient underwent the procedure and had no intra-operative complications. Post-operatively, the patient was seen by medicine and PT. The patient received ELIQUIS for DVTP. The patient received pain medications per orthopedic pain management pathway and the pain was appropriately controlled. The patient did not have any post-operative medical complications. The patient was discharged in stable condition.

## 2024-12-23 NOTE — BRIEF OPERATIVE NOTE - NSICDXBRIEFPOSTOP_GEN_ALL_CORE_FT
POST-OP DIAGNOSIS:  Broken internal right knee prosthesis, initial encounter 23-Dec-2024 21:55:04  Jose Armando Cha

## 2024-12-23 NOTE — DISCHARGE NOTE PROVIDER - CARE PROVIDER_API CALL
Remi Baptiste  Joint Reconstruction  [address], [address] [place] [address]  Phone: [phone]  Fax: [phone]  Follow Up Time:

## 2024-12-23 NOTE — DISCHARGE NOTE PROVIDER - NSDCCPCAREPLAN_GEN_ALL_CORE_FT
PRINCIPAL DISCHARGE DIAGNOSIS  Diagnosis: Broken internal right knee prosthesis, initial encounter  Assessment and Plan of Treatment:       SECONDARY DISCHARGE DIAGNOSES  Diagnosis: Broken internal right knee prosthesis, initial encounter  Assessment and Plan of Treatment:

## 2024-12-23 NOTE — DISCHARGE NOTE PROVIDER - NSDCMRMEDTOKEN_GEN_ALL_CORE_FT
BioSalt capsule: 1 caps once a day  Liquid fish oil: 1tsp q HS  Melatonin 10 mg oral tablet: 1 tab(s) orally once a day (at bedtime)  Probiotic gummies: 2 gummies once a day   acetaminophen 325 mg oral tablet: 3 tab(s) orally every 8 hours as needed for  pain  aspirin 81 mg oral delayed release tablet: 1 tab(s) orally 2 times a day begin after completion of eliquis  Bactrim  mg-160 mg oral tablet: 1 tab(s) orally 2 times a day  BioSalt capsule: 1 caps once a day  CeleBREX 200 mg oral capsule: 1 cap(s) orally 2 times a day  Eliquis 2.5 mg oral tablet: 1 tab(s) orally 2 times a day  Liquid fish oil: 1tsp q HS  Melatonin 10 mg oral tablet: 1 tab(s) orally once a day (at bedtime)  Narcan 4 mg/0.1 mL nasal spray: 1 spray(s) intranasally once  omeprazole 20 mg oral delayed release tablet: 1 tab(s) orally once a day before breakfast  oxyCODONE 5 mg oral tablet: 1 tab(s) orally every 6 hours as needed for pain MDD: 4  Probiotic gummies: 2 gummies once a day  Senna 8.6 mg oral tablet: 2 tab(s) orally once a day (at bedtime)

## 2024-12-24 ENCOUNTER — TRANSCRIPTION ENCOUNTER (OUTPATIENT)
Age: 59
End: 2024-12-24

## 2024-12-24 ENCOUNTER — RX RENEWAL (OUTPATIENT)
Age: 59
End: 2024-12-24

## 2024-12-24 VITALS
OXYGEN SATURATION: 97 % | TEMPERATURE: 98 F | SYSTOLIC BLOOD PRESSURE: 106 MMHG | DIASTOLIC BLOOD PRESSURE: 65 MMHG | RESPIRATION RATE: 18 BRPM | HEART RATE: 89 BPM

## 2024-12-24 DIAGNOSIS — D62 ACUTE POSTHEMORRHAGIC ANEMIA: ICD-10-CM

## 2024-12-24 DIAGNOSIS — D72.829 ELEVATED WHITE BLOOD CELL COUNT, UNSPECIFIED: ICD-10-CM

## 2024-12-24 LAB
ANION GAP SERPL CALC-SCNC: 14 MMOL/L — SIGNIFICANT CHANGE UP (ref 5–17)
BUN SERPL-MCNC: 10.1 MG/DL — SIGNIFICANT CHANGE UP (ref 8–20)
CALCIUM SERPL-MCNC: 8.1 MG/DL — LOW (ref 8.4–10.5)
CHLORIDE SERPL-SCNC: 105 MMOL/L — SIGNIFICANT CHANGE UP (ref 96–108)
CO2 SERPL-SCNC: 21 MMOL/L — LOW (ref 22–29)
CREAT SERPL-MCNC: 0.67 MG/DL — SIGNIFICANT CHANGE UP (ref 0.5–1.3)
EGFR: 101 ML/MIN/1.73M2 — SIGNIFICANT CHANGE UP
GLUCOSE SERPL-MCNC: 144 MG/DL — HIGH (ref 70–99)
GRAM STN FLD: SIGNIFICANT CHANGE UP
HCT VFR BLD CALC: 30 % — LOW (ref 34.5–45)
HGB BLD-MCNC: 9.7 G/DL — LOW (ref 11.5–15.5)
MCHC RBC-ENTMCNC: 25.8 PG — LOW (ref 27–34)
MCHC RBC-ENTMCNC: 32.3 G/DL — SIGNIFICANT CHANGE UP (ref 32–36)
MCV RBC AUTO: 79.8 FL — LOW (ref 80–100)
PLATELET # BLD AUTO: 243 K/UL — SIGNIFICANT CHANGE UP (ref 150–400)
POTASSIUM SERPL-MCNC: 3.7 MMOL/L — SIGNIFICANT CHANGE UP (ref 3.5–5.3)
POTASSIUM SERPL-SCNC: 3.7 MMOL/L — SIGNIFICANT CHANGE UP (ref 3.5–5.3)
RBC # BLD: 3.76 M/UL — LOW (ref 3.8–5.2)
RBC # FLD: 15.6 % — HIGH (ref 10.3–14.5)
SODIUM SERPL-SCNC: 140 MMOL/L — SIGNIFICANT CHANGE UP (ref 135–145)
SPECIMEN SOURCE: SIGNIFICANT CHANGE UP
SURGICAL PATHOLOGY STUDY: SIGNIFICANT CHANGE UP
WBC # BLD: 14.28 K/UL — HIGH (ref 3.8–10.5)
WBC # FLD AUTO: 14.28 K/UL — HIGH (ref 3.8–10.5)

## 2024-12-24 PROCEDURE — 87075 CULTR BACTERIA EXCEPT BLOOD: CPT

## 2024-12-24 PROCEDURE — C1713: CPT

## 2024-12-24 PROCEDURE — 97110 THERAPEUTIC EXERCISES: CPT

## 2024-12-24 PROCEDURE — 97116 GAIT TRAINING THERAPY: CPT

## 2024-12-24 PROCEDURE — 85027 COMPLETE CBC AUTOMATED: CPT

## 2024-12-24 PROCEDURE — 36415 COLL VENOUS BLD VENIPUNCTURE: CPT

## 2024-12-24 PROCEDURE — 99222 1ST HOSP IP/OBS MODERATE 55: CPT

## 2024-12-24 PROCEDURE — C1776: CPT

## 2024-12-24 PROCEDURE — 87205 SMEAR GRAM STAIN: CPT

## 2024-12-24 PROCEDURE — 87070 CULTURE OTHR SPECIMN AEROBIC: CPT

## 2024-12-24 PROCEDURE — C9399: CPT

## 2024-12-24 PROCEDURE — 80048 BASIC METABOLIC PNL TOTAL CA: CPT

## 2024-12-24 PROCEDURE — 87015 SPECIMEN INFECT AGNT CONCNTJ: CPT

## 2024-12-24 PROCEDURE — 97163 PT EVAL HIGH COMPLEX 45 MIN: CPT

## 2024-12-24 PROCEDURE — 88300 SURGICAL PATH GROSS: CPT

## 2024-12-24 PROCEDURE — 73560 X-RAY EXAM OF KNEE 1 OR 2: CPT

## 2024-12-24 RX ORDER — SENNOSIDES 8.6 MG/1
2 TABLET, FILM COATED ORAL
Qty: 14 | Refills: 0
Start: 2024-12-24 | End: 2024-12-30

## 2024-12-24 RX ORDER — APIXABAN 5 MG/1
1 TABLET, FILM COATED ORAL
Qty: 28 | Refills: 0
Start: 2024-12-24 | End: 2025-01-06

## 2024-12-24 RX ORDER — ACETAMINOPHEN 80 MG/.8ML
3 SOLUTION/ DROPS ORAL
Qty: 90 | Refills: 0
Start: 2024-12-24 | End: 2025-01-02

## 2024-12-24 RX ORDER — SULFAMETHOXAZOLE/TRIMETHOPRIM 800-160 MG
1 TABLET ORAL
Qty: 14 | Refills: 0
Start: 2024-12-24 | End: 2024-12-30

## 2024-12-24 RX ORDER — CELECOXIB 200 MG
1 CAPSULE ORAL
Qty: 42 | Refills: 0
Start: 2024-12-24 | End: 2025-01-13

## 2024-12-24 RX ORDER — ASPIRIN 81 MG
1 TABLET, DELAYED RELEASE (ENTERIC COATED) ORAL
Qty: 56 | Refills: 0
Start: 2024-12-24 | End: 2025-01-20

## 2024-12-24 RX ORDER — OXYCODONE HCL 15 MG
1 TABLET ORAL
Qty: 28 | Refills: 0
Start: 2024-12-24

## 2024-12-24 RX ORDER — INFLUENZA A VIRUS A/WISCONSIN/588/2019 (H1N1) RECOMBINANT HEMAGGLUTININ ANTIGEN, INFLUENZA A VIRUS A/DARWIN/6/2021 (H3N2) RECOMBINANT HEMAGGLUTININ ANTIGEN, INFLUENZA B VIRUS B/AUSTRIA/1359417/2021 RECOMBINANT HEMAGGLUTININ ANTIGEN, AND INFLUENZA B VIRUS B/PHUKET/3073/2013 RECOMBINANT HEMAGGLUTININ ANTIGEN 45; 45; 45; 45 UG/.5ML; UG/.5ML; UG/.5ML; UG/.5ML
0.5 INJECTION INTRAMUSCULAR ONCE
Refills: 0 | Status: DISCONTINUED | OUTPATIENT
Start: 2024-12-24 | End: 2024-12-24

## 2024-12-24 RX ORDER — OMEPRAZOLE MAGNESIUM 20 MG/1
1 CAPSULE, DELAYED RELEASE ORAL
Qty: 42 | Refills: 0
Start: 2024-12-24 | End: 2025-02-03

## 2024-12-24 RX ORDER — NALOXONE HCL 0.4 MG/ML
1 VIAL (ML) INJECTION
Qty: 1 | Refills: 0
Start: 2024-12-24

## 2024-12-24 RX ADMIN — ACETAMINOPHEN 400 MILLIGRAM(S): 80 SOLUTION/ DROPS ORAL at 05:11

## 2024-12-24 RX ADMIN — ACETAMINOPHEN 975 MILLIGRAM(S): 80 SOLUTION/ DROPS ORAL at 00:00

## 2024-12-24 RX ADMIN — KETOROLAC TROMETHAMINE 15 MILLIGRAM(S): 30 INJECTION INTRAMUSCULAR; INTRAVENOUS at 12:56

## 2024-12-24 RX ADMIN — KETOROLAC TROMETHAMINE 15 MILLIGRAM(S): 30 INJECTION INTRAMUSCULAR; INTRAVENOUS at 06:27

## 2024-12-24 RX ADMIN — KETOROLAC TROMETHAMINE 15 MILLIGRAM(S): 30 INJECTION INTRAMUSCULAR; INTRAVENOUS at 04:59

## 2024-12-24 RX ADMIN — Medication 2000 MILLIGRAM(S): at 09:29

## 2024-12-24 RX ADMIN — KETOROLAC TROMETHAMINE 15 MILLIGRAM(S): 30 INJECTION INTRAMUSCULAR; INTRAVENOUS at 00:00

## 2024-12-24 RX ADMIN — Medication 5 MILLIGRAM(S): at 01:22

## 2024-12-24 RX ADMIN — PANTOPRAZOLE 40 MILLIGRAM(S): 40 TABLET, DELAYED RELEASE ORAL at 04:59

## 2024-12-24 RX ADMIN — APIXABAN 2.5 MILLIGRAM(S): 5 TABLET, FILM COATED ORAL at 04:59

## 2024-12-24 RX ADMIN — Medication 10 MILLIGRAM(S): at 04:59

## 2024-12-24 RX ADMIN — ACETAMINOPHEN 975 MILLIGRAM(S): 80 SOLUTION/ DROPS ORAL at 14:09

## 2024-12-24 RX ADMIN — Medication 10 MILLIGRAM(S): at 05:59

## 2024-12-24 RX ADMIN — Medication 5 MILLIGRAM(S): at 02:22

## 2024-12-24 RX ADMIN — Medication 2000 MILLIGRAM(S): at 04:59

## 2024-12-24 RX ADMIN — ACETAMINOPHEN 1000 MILLIGRAM(S): 80 SOLUTION/ DROPS ORAL at 06:27

## 2024-12-24 NOTE — CONSULT NOTE ADULT - PROBLEM SELECTOR RECOMMENDATION 9
S/P right knee revision  Post op ABX as per SCIP  IVF until taking po well  PT/Pain control/DVT ppx as per Ortho  Incentive spirometer S/P right knee revision  Post op ABX as per SCIP  IVF until taking po well  PT/Pain control/DVT ppx as per Ortho  Incentive spirometer  TOV

## 2024-12-24 NOTE — CONSULT NOTE ADULT - NS ATTEND AMEND GEN_ALL_CORE FT
Pt seen and examined at bedside  Pt reports she felt a little dizzy after Oxycodone, which is her usual reaction to Oxycodone (has had similar reactions to it with prior surgeries)     CONSTITUTIONAL: Awake, alert and in no apparent distress  CARDIAC: Normal rate, regular rhythm.  Heart sounds S1, S2.    RESPIRATORY: Breath sounds clear and equal bilaterally.   ABDOMINAL: Nontender to palpation. No rebound/ guarding   EXTREMITIES: R knee dressing c/d/i   NEUROLOGICAL: Alert and oriented, no focal deficits, no motor or sensory deficits     Agree with above, amendments made where necessary   DC planning per primary team

## 2024-12-24 NOTE — PHYSICAL THERAPY INITIAL EVALUATION ADULT - PLANNED THERAPY INTERVENTIONS, PT EVAL
"Last Written Prescription Date:  4/19/17  Last Fill Quantity: 90 tablet,  # refills: 3   Last office visit: 4/19/2017 with prescribing provider:  Becky   Future Office Visit:   Next 5 appointments (look out 90 days)     May 24, 2018 11:00 AM CDT   PHYSICAL with Susan Pena PA-C   [facility]    [address] [id number] [phone]                 Requested Prescriptions   Pending Prescriptions Disp Refills     traZODone (DESYREL) 50 MG tablet 90 tablet 3     Sig: Take 1 tablet (50 mg) by mouth nightly as needed for sleep    Serotonin Modulators Passed    5/9/2018  4:55 PM       Passed - Recent (12 mo) or future (30 days) visit within the authorizing provider's specialty    Patient had office visit in the last 12 months or has a visit in the next 30 days with authorizing provider or within the authorizing provider's specialty.  See \"Patient Info\" tab in inbasket, or \"Choose Columns\" in Meds & Orders section of the refill encounter.           Passed - Patient is age 18 or older       Passed - No active pregnancy on record       Passed - No positive pregnancy test in past 12 months          " bed mobility training/gait training/transfer training

## 2024-12-24 NOTE — PHYSICAL THERAPY INITIAL EVALUATION ADULT - ACTIVE RANGE OF MOTION EXAMINATION, REHAB EVAL
Right LE grossly 2+/5, -5 to 90 right knee/bilateral upper extremity Active ROM was WFL (within functional limits)/bilateral  lower extremity Active ROM was WFL (within functional limits)/deficits as listed below

## 2024-12-24 NOTE — PATIENT PROFILE ADULT - FALL HARM RISK - HARM RISK INTERVENTIONS
Assistance with ambulation/Assistance OOB with selected safe patient handling equipment/Communicate Risk of Fall with Harm to all staff/Discuss with provider need for PT consult/Monitor gait and stability/Provide patient with walking aids - walker, cane, crutches/Reinforce activity limits and safety measures with patient and family/Sit up slowly, dangle for a short time, stand at bedside before walking/Tailored Fall Risk Interventions/Use of alarms - bed, chair and/or voice tab/Visual Cue: Yellow wristband and red socks/Bed in lowest position, wheels locked, appropriate side rails in place/Call bell, personal items and telephone in reach/Instruct patient to call for assistance before getting out of bed or chair/Non-slip footwear when patient is out of bed/[place] to call system/Physically safe environment - no spills, clutter or unnecessary equipment/Purposeful Proactive Rounding/Room/bathroom lighting operational, light cord in reach

## 2024-12-24 NOTE — PHYSICAL THERAPY INITIAL EVALUATION ADULT - ADDITIONAL COMMENTS
Pt lives in a house with 2 steps to enter with  rails and  0 stairs inside.   Pt owns medical equipment: SAC, RW, SC, Commode   Pt lives with: Spouse and son   Someone is always available to provide assist.

## 2024-12-24 NOTE — PROGRESS NOTE ADULT - SUBJECTIVE AND OBJECTIVE BOX
Patient seen and examined at bedside. comfortable in bed, pain controlled. Denies fever/chills, SOB/chest pain, abdominal pain, numbness/tingling. no complaints.    Vital Signs Last 24 Hrs  T(C): 36.6 (24 Dec 2024 05:10), Max: 36.8 (23 Dec 2024 23:00)  T(F): 97.8 (24 Dec 2024 05:10), Max: 98.2 (23 Dec 2024 23:00)  HR: 86 (24 Dec 2024 05:10) (76 - 91)  BP: 125/69 (24 Dec 2024 05:10) (112/56 - 146/90)  BP(mean): --  RR: 18 (24 Dec 2024 05:10) (13 - 19)  SpO2: 96% (24 Dec 2024 05:10) (96% - 100%)    Parameters below as of 24 Dec 2024 05:10  Patient On (Oxygen Delivery Method): room air    General: NAD  RLE: Prevena noted, functioning. 0 cc in cannister. SILT. + dorsi/plantarflexion. DP 2+. calf soft NT B/L.                          9.7    14.28 )-----------( 243      ( 24 Dec 2024 05:47 )             30.0     A/P: 59 y.o F s/p right TKA revision POD #1  - WBAT  - DVTP  - suzanna to come out today  - d/c planning - patient requesting to be d/c home today, pending clearance by medicine and PT
Ortho Post Op Check    Name: VIRI DIXON    MR #: [id number]    Procedure: revision right total knee arthroplasty   Surgeon: Emile    Pt comfortable without complaints, pain controlled  Denies CP, SOB, N/V, numbness/tingling     General Exam:  Vital Signs Last 24 Hrs  T(C): --  T(F): --  HR: --  BP: --  BP(mean): --  RR: --  SpO2: --    General: Pt Alert and oriented, NAD, controlled pain.  PREVENA Dressing C/D/I. No bleeding.  Pulses: 2+ dorsalis pedis pulse. Cap refill < 2 sec.  Sensation: Grossly intact to light touch without deficit.  Motor: + EHL/FHL/TA/GS    T(C): 36.4 (12-23-24 @ 13:09), Max: 36.4 (12-23-24 @ 13:09)  HR: 76 (12-23-24 @ 13:09) (76 - 76)  BP: 146/90 (12-23-24 @ 13:09) (146/90 - 146/90)  RR: 18 (12-23-24 @ 13:09) (18 - 18)  SpO2: 100% (12-23-24 @ 13:09) (100% - 100%)    A/P: 59yFemale POD#0 s/p revision right total knee arthroplasty   - Stable  - Pain Control  - DVT ppx: ELIQUIS   - WBAT, PT/OT  - d/c suzanna in AM  - f/u OR Cx

## 2024-12-24 NOTE — DISCHARGE NOTE NURSING/CASE MANAGEMENT/SOCIAL WORK - PATIENT PORTAL LINK FT
You can access the FollowMyHealth Patient Portal offered by [facility] by registering at the following website: [email or web address]. By joining [employer]’s FollowMyHealth portal, you will also be able to view your health information using other applications (apps) compatible with our system.

## 2024-12-24 NOTE — DISCHARGE NOTE NURSING/CASE MANAGEMENT/SOCIAL WORK - NSDCPEFALRISK_GEN_ALL_CORE
For information on Fall & Injury Prevention, visit: [email or web address] OR  [email or web address] OR  https://www.cdc.gov/steadi/patient.html

## 2024-12-24 NOTE — DISCHARGE NOTE NURSING/CASE MANAGEMENT/SOCIAL WORK - FINANCIAL ASSISTANCE
[facility] provides services at a reduced cost to those who are determined to be eligible through [facility]’s financial assistance program. Information regarding [facility]’s financial assistance program can be found by going to [email or web address] or by calling [phone].

## 2024-12-24 NOTE — CONSULT NOTE ADULT - SUBJECTIVE AND OBJECTIVE BOX
HPI:  Patient is a 59 year old  female with PMH includes OA. Patient c/o right knee pain and is s/p right total knee replacement on 5/16/2024. States since replacement she has had "multiple issues". Reports on Aug 10 she had wash out of joint due to infection and surgery to remove parts 8/19/24, she had PICC placed and was treated with IV antibiotics x 6 weeks. States in more pain now then before initial surgery. Describes pain as constant, pulling and twisting. Current pain level 7/10 and max pain level 10/10. Reports swelling and buckling of right knee, denies falls or use of cane. Patient is frustrated due to pain and inability to "get around", patient states she lost her job because shes been out for so long. She is S/P  revision right total knee replacement on 12/23/24 with Dr. Baptiste.       PAST MEDICAL & SURGICAL HISTORY:  No pertinent past medical history      H/O arthroscopy of knee      S/P wrist surgery      S/P cholecystectomy      S/P shoulder surgery      H/O total knee replacement, right    Home Medications:  · 	Melatonin 10 mg oral tablet: 1 tab(s) orally once a day (at bedtime)  · 	BioSalt capsule: 1 caps once a day  · 	Probiotic gummies: , 2 gummies once a day  · 	Liquid fish oil: Last Dose Taken:  , 1tsp q HS      MEDICATIONS  (STANDING):  acetaminophen     Tablet .. 975 milliGRAM(s) Oral every 8 hours  apixaban 2.5 milliGRAM(s) Oral two times a day  ceFAZolin  Injectable. 2000 milliGRAM(s) IV Push <User Schedule>  influenza   Vaccine 0.5 milliLiter(s) IntraMuscular once  ketorolac   Injectable 15 milliGRAM(s) IV Push every 6 hours  melatonin 5 milliGRAM(s) Oral at bedtime  pantoprazole    Tablet 40 milliGRAM(s) Oral before breakfast  polyethylene glycol 3350 17 Gram(s) Oral at bedtime  senna 2 Tablet(s) Oral at bedtime  sodium chloride 0.9%. 1000 milliLiter(s) (125 mL/Hr) IV Continuous <Continuous>    MEDICATIONS  (PRN):  aluminum hydroxide/magnesium hydroxide/simethicone Suspension 30 milliLiter(s) Oral four times a day PRN Indigestion  HYDROmorphone   Tablet 4 milliGRAM(s) Oral every 4 hours PRN Severe Pain (7 - 10)  magnesium hydroxide Suspension 30 milliLiter(s) Oral daily PRN Constipation  ondansetron Injectable 4 milliGRAM(s) IV Push every 6 hours PRN Nausea and/or Vomiting  oxyCODONE    IR 5 milliGRAM(s) Oral every 3 hours PRN Mild Pain (1 - 3)  oxyCODONE    IR 10 milliGRAM(s) Oral every 3 hours PRN Moderate Pain (4 - 6)      Allergies    penicillin (Hives; Pruritus)    Intolerances        SOCIAL HISTORY:  Never a smoker  Denies ETOH/IVDA    FAMILY HISTORY:  FH: heart disease (Grandparent)    FHx: diabetes mellitus (Grandparent)        Vital Signs Last 24 Hrs  T(C): 36.6 (24 Dec 2024 05:10), Max: 36.8 (23 Dec 2024 23:00)  T(F): 97.8 (24 Dec 2024 05:10), Max: 98.2 (23 Dec 2024 23:00)  HR: 86 (24 Dec 2024 05:10) (76 - 91)  BP: 125/69 (24 Dec 2024 05:10) (112/56 - 146/90)  BP(mean): --  RR: 18 (24 Dec 2024 05:10) (13 - 19)  SpO2: 96% (24 Dec 2024 05:10) (96% - 100%)    Parameters below as of 24 Dec 2024 05:10  Patient On (Oxygen Delivery Method): room air        LABS:                        9.7    14.28 )-----------( 243      ( 24 Dec 2024 05:47 )             30.0     12-24    140  |  105  |  10.1  ----------------------------<  144[H]  3.7   |  21.0[L]  |  0.67    Ca    8.1[L]      24 Dec 2024 05:47        Urinalysis Basic - ( 24 Dec 2024 05:47 )    Color: x / Appearance: x / SG: x / pH: x  Gluc: 144 mg/dL / Ketone: x  / Bili: x / Urobili: x   Blood: x / Protein: x / Nitrite: x   Leuk Esterase: x / RBC: x / WBC x   Sq Epi: x / Non Sq Epi: x / Bacteria: x          RADIOLOGY & ADDITIONAL STUDIES: HPI:  Patient is a 59 year old  female with PMH includes OA. Patient c/o right knee pain and is s/p right total knee replacement on 5/16/2024. States since replacement she has had "multiple issues". Reports on Aug 10 she had wash out of joint due to infection and surgery to remove parts 8/19/24, she had PICC placed and was treated with IV antibiotics x 6 weeks. States in more pain now then before initial surgery. Describes pain as constant, pulling and twisting. Current pain level 7/10 and max pain level 10/10. Reports swelling and buckling of right knee, denies falls or use of cane. Patient is frustrated due to pain and inability to "get around", patient states she lost her job because shes been out for so long. She is S/P  revision right total knee replacement on 12/23/24 with Dr. Baptiste.   Seen and examined POD#1. No acute issues overnight. Wilkinson discontinued this morning. Worked with PT. PAin controlled on current RX. Wants to go home today.     PAST MEDICAL & SURGICAL HISTORY:  No pertinent past medical history      H/O arthroscopy of knee      S/P wrist surgery      S/P cholecystectomy      S/P shoulder surgery      H/O total knee replacement, right    Home Medications:  · 	Melatonin 10 mg oral tablet: 1 tab(s) orally once a day (at bedtime)  · 	BioSalt capsule: 1 caps once a day  · 	Probiotic gummies: , 2 gummies once a day  · 	Liquid fish oil: Last Dose Taken:  , 1tsp q HS      MEDICATIONS  (STANDING):  acetaminophen     Tablet .. 975 milliGRAM(s) Oral every 8 hours  apixaban 2.5 milliGRAM(s) Oral two times a day  ceFAZolin  Injectable. 2000 milliGRAM(s) IV Push <User Schedule>  influenza   Vaccine 0.5 milliLiter(s) IntraMuscular once  ketorolac   Injectable 15 milliGRAM(s) IV Push every 6 hours  melatonin 5 milliGRAM(s) Oral at bedtime  pantoprazole    Tablet 40 milliGRAM(s) Oral before breakfast  polyethylene glycol 3350 17 Gram(s) Oral at bedtime  senna 2 Tablet(s) Oral at bedtime  sodium chloride 0.9%. 1000 milliLiter(s) (125 mL/Hr) IV Continuous <Continuous>    MEDICATIONS  (PRN):  aluminum hydroxide/magnesium hydroxide/simethicone Suspension 30 milliLiter(s) Oral four times a day PRN Indigestion  HYDROmorphone   Tablet 4 milliGRAM(s) Oral every 4 hours PRN Severe Pain (7 - 10)  magnesium hydroxide Suspension 30 milliLiter(s) Oral daily PRN Constipation  ondansetron Injectable 4 milliGRAM(s) IV Push every 6 hours PRN Nausea and/or Vomiting  oxyCODONE    IR 5 milliGRAM(s) Oral every 3 hours PRN Mild Pain (1 - 3)  oxyCODONE    IR 10 milliGRAM(s) Oral every 3 hours PRN Moderate Pain (4 - 6)      Allergies    penicillin (Hives; Pruritus)    Intolerances        SOCIAL HISTORY:  Never a smoker  Denies ETOH/IVDA    FAMILY HISTORY:  FH: heart disease (Grandparent)    FHx: diabetes mellitus (Grandparent)    ROS:  Constitutional, Eyes, ENT, Cardiovascular, Respiratory, Gastrointestinal, Genitourinary, Musculoskeletal, Integumentary, Neurological, Psychiatric, Endocrine, Heme/Lymph, and Allergic/Immunologic review of systems are otherwise negative except as noted in the HPI      Vital Signs Last 24 Hrs  T(C): 36.6 (24 Dec 2024 05:10), Max: 36.8 (23 Dec 2024 23:00)  T(F): 97.8 (24 Dec 2024 05:10), Max: 98.2 (23 Dec 2024 23:00)  HR: 86 (24 Dec 2024 05:10) (76 - 91)  BP: 125/69 (24 Dec 2024 05:10) (112/56 - 146/90)  BP(mean): --  RR: 18 (24 Dec 2024 05:10) (13 - 19)  SpO2: 96% (24 Dec 2024 05:10) (96% - 100%)    Parameters below as of 24 Dec 2024 05:10  Patient On (Oxygen Delivery Method): room air    PHYSICAL EXAM:    General: Well developed; well groomed; in no acute distress  Eyes: PERRLA, EOMI; conjunctiva and sclera clear  Head: Normocephalic; atraumatic  ENMT: No nasal discharge; airway clear  Neck: Supple; non tender; no JVD  Respiratory: No wheezes, rales or rhonchi  Cardiovascular: Regular rate and rhythm. S1 and S2 Normal; No murmurs, gallops or rubs  Gastrointestinal: Soft non-tender non-distended; Normal bowel sounds  Extremities: Right knee dressing C/D/I. No clubbing, cyanosis or edema  Vascular: Peripheral pulses palpable 2+ bilaterally  Neurological: Alert and oriented x4  Skin: Warm and dry. No acute rash  Psychiatric: Cooperative and appropriate    LABS:                        9.7    14.28 )-----------( 243      ( 24 Dec 2024 05:47 )             30.0     12-24    140  |  105  |  10.1  ----------------------------<  144[H]  3.7   |  21.0[L]  |  0.67    Ca    8.1[L]      24 Dec 2024 05:47        Urinalysis Basic - ( 24 Dec 2024 05:47 )    Color: x / Appearance: x / SG: x / pH: x  Gluc: 144 mg/dL / Ketone: x  / Bili: x / Urobili: x   Blood: x / Protein: x / Nitrite: x   Leuk Esterase: x / RBC: x / WBC x   Sq Epi: x / Non Sq Epi: x / Bacteria: x          RADIOLOGY & ADDITIONAL STUDIES:

## 2024-12-24 NOTE — PHYSICAL THERAPY INITIAL EVALUATION ADULT - GAIT TRAINING, PT EVAL
Time Frame: 1-2 days   Goal: Modified Independent with RW x 300 feet / Stairs: pt will navigate 5 stairs independently

## 2024-12-24 NOTE — CONSULT NOTE ADULT - ASSESSMENT
Patient is a 59 year old  female with PMH includes OA. Patient c/o right knee pain and is s/p right total knee replacement on 5/16/2024. States since replacement she has had "multiple issues". Reports on Aug 10 she had wash out of joint due to infection and surgery to remove parts 8/19/24, she had PICC placed and was treated with IV antibiotics x 6 weeks. States in more pain now then before initial surgery. Describes pain as constant, pulling and twisting. Current pain level 7/10 and max pain level 10/10. Reports swelling and buckling of right knee, denies falls or use of cane. Patient is frustrated due to pain and inability to "get around", patient states she lost her job because shes been out for so long. She is S/P  revision right total knee replacement on 12/23/24 with Dr. Baptiste.

## 2024-12-24 NOTE — CONSULT NOTE ADULT - NS ATTEND BILL GEN_ALL_CORE
Time-based billing (NON-critical care) Time-based billing (NON-critical care) Time-based billing (NON-critical care) Time-based billing (NON-critical care) Attending to bill

## 2024-12-27 ENCOUNTER — NON-APPOINTMENT (OUTPATIENT)
Age: 59
End: 2024-12-27

## 2025-01-03 ENCOUNTER — APPOINTMENT (OUTPATIENT)
Dept: ORTHOPEDIC SURGERY | Facility: CLINIC | Age: 60
End: 2025-01-03
Payer: COMMERCIAL

## 2025-01-03 VITALS — BODY MASS INDEX: 31.65 KG/M2 | HEIGHT: 65 IN | WEIGHT: 190 LBS

## 2025-01-03 PROCEDURE — 99024 POSTOP FOLLOW-UP VISIT: CPT

## 2025-01-07 LAB
CULTURE RESULTS: SIGNIFICANT CHANGE UP
SPECIMEN SOURCE: SIGNIFICANT CHANGE UP

## 2025-01-17 ENCOUNTER — APPOINTMENT (OUTPATIENT)
Dept: ORTHOPEDIC SURGERY | Facility: CLINIC | Age: 60
End: 2025-01-17
Payer: COMMERCIAL

## 2025-01-17 VITALS — HEIGHT: 65 IN | WEIGHT: 190 LBS | BODY MASS INDEX: 31.65 KG/M2

## 2025-01-17 PROCEDURE — 99024 POSTOP FOLLOW-UP VISIT: CPT

## 2025-01-17 PROCEDURE — 73562 X-RAY EXAM OF KNEE 3: CPT | Mod: RT

## 2025-01-23 ENCOUNTER — APPOINTMENT (OUTPATIENT)
Dept: GASTROENTEROLOGY | Facility: CLINIC | Age: 60
End: 2025-01-23
Payer: COMMERCIAL

## 2025-01-23 VITALS — WEIGHT: 190 LBS | HEIGHT: 65 IN | BODY MASS INDEX: 31.65 KG/M2

## 2025-01-23 DIAGNOSIS — K52.9 NONINFECTIVE GASTROENTERITIS AND COLITIS, UNSPECIFIED: ICD-10-CM

## 2025-01-23 DIAGNOSIS — K58.2 MIXED IRRITABLE BOWEL SYNDROME: ICD-10-CM

## 2025-01-23 PROCEDURE — 99214 OFFICE O/P EST MOD 30 MIN: CPT | Mod: 95

## 2025-01-23 RX ORDER — ASPIRIN 81 MG/1
81 TABLET ORAL
Refills: 0 | Status: ACTIVE | COMMUNITY

## 2025-01-23 RX ORDER — CELECOXIB 200 MG/1
200 CAPSULE ORAL
Refills: 0 | Status: ACTIVE | COMMUNITY

## 2025-01-23 RX ORDER — PANTOPRAZOLE SODIUM 40 MG/1
40 TABLET, DELAYED RELEASE ORAL
Refills: 0 | Status: ACTIVE | COMMUNITY

## 2025-02-05 ENCOUNTER — APPOINTMENT (OUTPATIENT)
Dept: ORTHOPEDIC SURGERY | Facility: CLINIC | Age: 60
End: 2025-02-05
Payer: COMMERCIAL

## 2025-02-05 VITALS — WEIGHT: 190 LBS | BODY MASS INDEX: 31.65 KG/M2 | HEIGHT: 65 IN

## 2025-02-05 DIAGNOSIS — Z47.1 AFTERCARE FOLLOWING JOINT REPLACEMENT SURGERY: ICD-10-CM

## 2025-02-05 DIAGNOSIS — Z96.651 PRESENCE OF RIGHT ARTIFICIAL KNEE JOINT: ICD-10-CM

## 2025-02-05 PROCEDURE — 99024 POSTOP FOLLOW-UP VISIT: CPT

## 2025-02-05 PROCEDURE — 73562 X-RAY EXAM OF KNEE 3: CPT | Mod: 26,RT

## 2025-03-05 ENCOUNTER — APPOINTMENT (OUTPATIENT)
Dept: CARDIOLOGY | Facility: CLINIC | Age: 60
End: 2025-03-05
Payer: COMMERCIAL

## 2025-03-05 ENCOUNTER — NON-APPOINTMENT (OUTPATIENT)
Age: 60
End: 2025-03-05

## 2025-03-05 VITALS
DIASTOLIC BLOOD PRESSURE: 80 MMHG | HEART RATE: 75 BPM | SYSTOLIC BLOOD PRESSURE: 126 MMHG | WEIGHT: 196 LBS | HEIGHT: 65 IN | BODY MASS INDEX: 32.65 KG/M2

## 2025-03-05 DIAGNOSIS — R94.31 ABNORMAL ELECTROCARDIOGRAM [ECG] [EKG]: ICD-10-CM

## 2025-03-05 DIAGNOSIS — E78.5 HYPERLIPIDEMIA, UNSPECIFIED: ICD-10-CM

## 2025-03-05 DIAGNOSIS — R73.09 OTHER ABNORMAL GLUCOSE: ICD-10-CM

## 2025-03-05 PROCEDURE — 99214 OFFICE O/P EST MOD 30 MIN: CPT

## 2025-03-05 PROCEDURE — G2211 COMPLEX E/M VISIT ADD ON: CPT | Mod: NC

## 2025-03-05 PROCEDURE — 93000 ELECTROCARDIOGRAM COMPLETE: CPT

## 2025-03-21 ENCOUNTER — APPOINTMENT (OUTPATIENT)
Dept: ORTHOPEDIC SURGERY | Facility: CLINIC | Age: 60
End: 2025-03-21
Payer: COMMERCIAL

## 2025-03-21 VITALS — WEIGHT: 188 LBS | BODY MASS INDEX: 31.32 KG/M2 | HEIGHT: 65 IN

## 2025-03-21 PROCEDURE — 99212 OFFICE O/P EST SF 10 MIN: CPT | Mod: 24,25

## 2025-03-21 PROCEDURE — 20610 DRAIN/INJ JOINT/BURSA W/O US: CPT | Mod: 79,RT

## 2025-03-21 PROCEDURE — 73562 X-RAY EXAM OF KNEE 3: CPT | Mod: RT

## 2025-03-24 ENCOUNTER — APPOINTMENT (OUTPATIENT)
Dept: MRI IMAGING | Facility: CLINIC | Age: 60
End: 2025-03-24
Payer: COMMERCIAL

## 2025-03-24 PROCEDURE — 73721 MRI JNT OF LWR EXTRE W/O DYE: CPT | Mod: RT

## 2025-03-25 ENCOUNTER — NON-APPOINTMENT (OUTPATIENT)
Age: 60
End: 2025-03-25

## 2025-03-27 ENCOUNTER — OUTPATIENT (OUTPATIENT)
Dept: OUTPATIENT SERVICES | Facility: HOSPITAL | Age: 60
LOS: 1 days | End: 2025-03-27
Payer: COMMERCIAL

## 2025-03-27 ENCOUNTER — APPOINTMENT (OUTPATIENT)
Dept: INTERNAL MEDICINE | Facility: CLINIC | Age: 60
End: 2025-03-27
Payer: COMMERCIAL

## 2025-03-27 VITALS
TEMPERATURE: 98.5 F | HEIGHT: 65 IN | SYSTOLIC BLOOD PRESSURE: 110 MMHG | DIASTOLIC BLOOD PRESSURE: 76 MMHG | OXYGEN SATURATION: 98 % | BODY MASS INDEX: 31.32 KG/M2 | WEIGHT: 188 LBS | HEART RATE: 76 BPM

## 2025-03-27 VITALS
TEMPERATURE: 98 F | HEIGHT: 65 IN | WEIGHT: 189.82 LBS | DIASTOLIC BLOOD PRESSURE: 68 MMHG | HEART RATE: 73 BPM | SYSTOLIC BLOOD PRESSURE: 100 MMHG | RESPIRATION RATE: 18 BRPM | OXYGEN SATURATION: 97 %

## 2025-03-27 DIAGNOSIS — Z98.890 OTHER SPECIFIED POSTPROCEDURAL STATES: Chronic | ICD-10-CM

## 2025-03-27 DIAGNOSIS — Z96.651 PRESENCE OF RIGHT ARTIFICIAL KNEE JOINT: Chronic | ICD-10-CM

## 2025-03-27 DIAGNOSIS — Z01.818 ENCOUNTER FOR OTHER PREPROCEDURAL EXAMINATION: ICD-10-CM

## 2025-03-27 DIAGNOSIS — Z13.89 ENCOUNTER FOR SCREENING FOR OTHER DISORDER: ICD-10-CM

## 2025-03-27 DIAGNOSIS — T84.029A DISLOCATION OF UNSPECIFIED INTERNAL JOINT PROSTHESIS, INITIAL ENCOUNTER: ICD-10-CM

## 2025-03-27 DIAGNOSIS — Z90.49 ACQUIRED ABSENCE OF OTHER SPECIFIED PARTS OF DIGESTIVE TRACT: Chronic | ICD-10-CM

## 2025-03-27 DIAGNOSIS — Z29.9 ENCOUNTER FOR PROPHYLACTIC MEASURES, UNSPECIFIED: ICD-10-CM

## 2025-03-27 PROBLEM — Z78.9 OTHER SPECIFIED HEALTH STATUS: Chronic | Status: INACTIVE | Noted: 2024-04-25 | Resolved: 2025-03-27

## 2025-03-27 LAB
A1C WITH ESTIMATED AVERAGE GLUCOSE RESULT: 5.5 % — SIGNIFICANT CHANGE UP (ref 4–5.6)
ANION GAP SERPL CALC-SCNC: 11 MMOL/L — SIGNIFICANT CHANGE UP (ref 5–17)
APTT BLD: 30 SEC — SIGNIFICANT CHANGE UP (ref 24.5–35.6)
BASOPHILS # BLD AUTO: 0.04 K/UL — SIGNIFICANT CHANGE UP (ref 0–0.2)
BASOPHILS NFR BLD AUTO: 0.6 % — SIGNIFICANT CHANGE UP (ref 0–2)
BLD GP AB SCN SERPL QL: SIGNIFICANT CHANGE UP
BUN SERPL-MCNC: 12.4 MG/DL — SIGNIFICANT CHANGE UP (ref 8–20)
CALCIUM SERPL-MCNC: 9.3 MG/DL — SIGNIFICANT CHANGE UP (ref 8.4–10.5)
CHLORIDE SERPL-SCNC: 104 MMOL/L — SIGNIFICANT CHANGE UP (ref 96–108)
CO2 SERPL-SCNC: 25 MMOL/L — SIGNIFICANT CHANGE UP (ref 22–29)
CREAT SERPL-MCNC: 0.77 MG/DL — SIGNIFICANT CHANGE UP (ref 0.5–1.3)
EGFR: 89 ML/MIN/1.73M2 — SIGNIFICANT CHANGE UP
EGFR: 89 ML/MIN/1.73M2 — SIGNIFICANT CHANGE UP
EOSINOPHIL # BLD AUTO: 0.31 K/UL — SIGNIFICANT CHANGE UP (ref 0–0.5)
EOSINOPHIL NFR BLD AUTO: 4.4 % — SIGNIFICANT CHANGE UP (ref 0–6)
ESTIMATED AVERAGE GLUCOSE: 111 MG/DL — SIGNIFICANT CHANGE UP (ref 68–114)
GLUCOSE SERPL-MCNC: 96 MG/DL — SIGNIFICANT CHANGE UP (ref 70–99)
HCT VFR BLD CALC: 38.9 % — SIGNIFICANT CHANGE UP (ref 34.5–45)
HGB BLD-MCNC: 12.7 G/DL — SIGNIFICANT CHANGE UP (ref 11.5–15.5)
IMM GRANULOCYTES # BLD AUTO: 0.03 K/UL — SIGNIFICANT CHANGE UP (ref 0–0.07)
IMM GRANULOCYTES NFR BLD AUTO: 0.4 % — SIGNIFICANT CHANGE UP (ref 0–0.9)
INR BLD: 1 RATIO — SIGNIFICANT CHANGE UP (ref 0.85–1.16)
LYMPHOCYTES # BLD AUTO: 1.87 K/UL — SIGNIFICANT CHANGE UP (ref 1–3.3)
LYMPHOCYTES NFR BLD AUTO: 26.6 % — SIGNIFICANT CHANGE UP (ref 13–44)
MCHC RBC-ENTMCNC: 27.7 PG — SIGNIFICANT CHANGE UP (ref 27–34)
MCHC RBC-ENTMCNC: 32.6 G/DL — SIGNIFICANT CHANGE UP (ref 32–36)
MCV RBC AUTO: 84.9 FL — SIGNIFICANT CHANGE UP (ref 80–100)
MONOCYTES # BLD AUTO: 0.62 K/UL — SIGNIFICANT CHANGE UP (ref 0–0.9)
MONOCYTES NFR BLD AUTO: 8.8 % — SIGNIFICANT CHANGE UP (ref 2–14)
MRSA PCR RESULT.: SIGNIFICANT CHANGE UP
NEUTROPHILS # BLD AUTO: 4.15 K/UL — SIGNIFICANT CHANGE UP (ref 1.8–7.4)
NEUTROPHILS NFR BLD AUTO: 59.2 % — SIGNIFICANT CHANGE UP (ref 43–77)
NRBC # BLD AUTO: 0 K/UL — SIGNIFICANT CHANGE UP (ref 0–0)
NRBC # FLD: 0 K/UL — SIGNIFICANT CHANGE UP (ref 0–0)
NRBC BLD AUTO-RTO: 0 /100 WBCS — SIGNIFICANT CHANGE UP (ref 0–0)
PLATELET # BLD AUTO: 318 K/UL — SIGNIFICANT CHANGE UP (ref 150–400)
PMV BLD: 9.7 FL — SIGNIFICANT CHANGE UP (ref 7–13)
POTASSIUM SERPL-MCNC: 4.3 MMOL/L — SIGNIFICANT CHANGE UP (ref 3.5–5.3)
POTASSIUM SERPL-SCNC: 4.3 MMOL/L — SIGNIFICANT CHANGE UP (ref 3.5–5.3)
PROTHROM AB SERPL-ACNC: 11.6 SEC — SIGNIFICANT CHANGE UP (ref 9.9–13.4)
RBC # BLD: 4.58 M/UL — SIGNIFICANT CHANGE UP (ref 3.8–5.2)
RBC # FLD: 12.9 % — SIGNIFICANT CHANGE UP (ref 10.3–14.5)
S AUREUS DNA NOSE QL NAA+PROBE: SIGNIFICANT CHANGE UP
SODIUM SERPL-SCNC: 140 MMOL/L — SIGNIFICANT CHANGE UP (ref 135–145)
WBC # BLD: 7.02 K/UL — SIGNIFICANT CHANGE UP (ref 3.8–10.5)
WBC # FLD AUTO: 7.02 K/UL — SIGNIFICANT CHANGE UP (ref 3.8–10.5)

## 2025-03-27 PROCEDURE — 93010 ELECTROCARDIOGRAM REPORT: CPT

## 2025-03-27 PROCEDURE — 36415 COLL VENOUS BLD VENIPUNCTURE: CPT

## 2025-03-27 PROCEDURE — 83036 HEMOGLOBIN GLYCOSYLATED A1C: CPT

## 2025-03-27 PROCEDURE — 85730 THROMBOPLASTIN TIME PARTIAL: CPT

## 2025-03-27 PROCEDURE — 87641 MR-STAPH DNA AMP PROBE: CPT

## 2025-03-27 PROCEDURE — 99214 OFFICE O/P EST MOD 30 MIN: CPT

## 2025-03-27 PROCEDURE — 85025 COMPLETE CBC W/AUTO DIFF WBC: CPT

## 2025-03-27 PROCEDURE — 93005 ELECTROCARDIOGRAM TRACING: CPT

## 2025-03-27 PROCEDURE — 85610 PROTHROMBIN TIME: CPT

## 2025-03-27 PROCEDURE — 87640 STAPH A DNA AMP PROBE: CPT

## 2025-03-27 PROCEDURE — 80048 BASIC METABOLIC PNL TOTAL CA: CPT

## 2025-03-27 PROCEDURE — 86901 BLOOD TYPING SEROLOGIC RH(D): CPT

## 2025-03-27 PROCEDURE — G0463: CPT

## 2025-03-27 PROCEDURE — 86900 BLOOD TYPING SEROLOGIC ABO: CPT

## 2025-03-27 PROCEDURE — 86850 RBC ANTIBODY SCREEN: CPT

## 2025-03-27 NOTE — H&P PST ADULT - PROBLEM SELECTOR PLAN 1
Scheduled for right medial retinacular repair with Dr. Baptiste on 3/31/25 pending medical optimization. Last cardiac note and Echo results obtained and in the chart.

## 2025-03-27 NOTE — H&P PST ADULT - CARDIOVASCULAR
details… regular rate and rhythm/S1 S2 present/no gallops/no rub/no murmur/peripheral edema/vascular

## 2025-03-27 NOTE — H&P PST ADULT - HISTORY OF PRESENT ILLNESS
Patient is a 59 year old  female presenting today for PST, PMH includes OA  Patient c/o right knee pain and is s/p right total knee replacement on 5/16/2024. States since replacement she has had "multiple issues". Reports on Aug 10 she had wash out of joint due to infection and surgery to remove parts 8/19/24, she had PICC placed and was treated with IV antibiotics x 6 weeks. States in more pain now then before initial surgery. Describes pain as constant, pulling and twisting. Current pain level 7/10 and max pain level 10/10. Reports swelling and buckling of right knee, denies falls or use of cane. Patient is frustrated due to pain and inability to "get around", patient states she lost her job because shes been out for so long.  Patient is a 59 year old  female presenting today for PST, PMH includes OA  Patient c/o right knee pain and is s/p right total knee replacement on 5/16/2024. States since replacement she has had "multiple issues". Reports on Aug 10 she had wash out of joint due to infection and surgery to remove parts 8/19/24, she had PICC placed and was treated with IV antibiotics x 6 weeks. States in more pain now then before initial surgery. Describes pain as constant, pulling and twisting. Current pain level 7/10 and max pain level 10/10. Reports swelling and buckling of right knee, denies falls or use of cane. Patient is frustrated due to pain and inability to "get around", patient states she lost her job because shes been out for so long.   She is now scheduled for revision right total knee replacement on 12/23/24 with Dr. Baptiste pending medical optimization.  Cardiology note and Echo to be obtained.    Patient is a 59 year old  female presenting today for PST, PMH includes OA  Patient c/o right knee pain and is s/p right total knee replacement on 5/16/2024. She is s/p  revision right total knee replacement on 12/23/24. States since replacement she has had "multiple issues". Reports on Aug 10 she had wash out of joint due to infection and surgery to remove parts 8/19/24, she had PICC placed and was treated with IV antibiotics x 6 weeks. States in more pain now then before initial surgery. Describes pain as constant, pulling. . Current pain level 7/10 and max pain level 10/10. Reports swelling and buckling of right knee, denies falls or use of cane. Patient is frustrated due to pain and inability to "get around", patient states she lost her job because shes been out for so long. On friday she had 100cc of fluid  from right knee. States she stopped PT 6 weeks ago and now states she has "torn tendons around her patella"   She is now scheduled for with Dr. Baptiste pending medical optimization.  Cardiology note and Echo to be obtained.    Patient is a 59 year old  female presenting today for PST, PMH includes OA. Patient c/o right knee pain and is s/p revision of right total knee replacement on 12/23/24.  States she was advised to stopped PT 6 weeks ago and now states she has "torn tendons around her patella" She had initial joint replacement  5/16/2024. States since replacement she has had "multiple issues". Reports on 8/10/24 she had wash out of joint due to infection and surgery to remove parts 8/19/24, she had PICC placed and was treated with IV antibiotics x 6 weeks. States in more pain now then before initial surgery. Describes pain as constant, pulling.  Current pain level 7/10 and max pain level 10/10. Reports swelling and buckling of right knee, denies falls or use of cane. Patient is frustrated due to pain and inability to "get around", especially since she had revision surgery in December.  Patient  states she lost her job because shes been out for so long and has her son's wedding coming up. On 3/21/25 she had "100cc" of fluid  removed from right knee.  She states she has followed all directions in post-op care and exercises as  per PT instructions. MRI of right knee performed on 3/24/25 demonstrated : Full-thickness retracted tear of the vastus intermedialis and rectus femoris tendons with retraction of at least 6 cm is beyond the field of view. Partial tearing and degeneration of the vastus lateralis and medialis as well as the medial and lateral retinacular structures without high-grade tearing or detachment. Very large hemorrhagic joint effusion extends anteriorly through the defect into the quadriceps into the prepatellar region. She is beyond frustrated and just wants to "get back to normal".  She is now scheduled for right medial retinacular repair  with Dr. Baptiste pending on 3/31/25 pending medical optimization.  Cardiology note and Echo to be obtained.

## 2025-03-27 NOTE — H&P PST ADULT - NSICDXPASTMEDICALHX_GEN_ALL_CORE_FT
PAST MEDICAL HISTORY:  No pertinent past medical history      PAST MEDICAL HISTORY:  Osteoarthritis

## 2025-03-27 NOTE — H&P PST ADULT - ASSESSMENT
This is a           CAPRINI SCORE    AGE RELATED RISK FACTORS                                                             [ ] Age 41-60 years                                            (1 Point)  [ ] Age: 61-74 years                                           (2 Points)                 [ ] Age= 75 years                                                (3 Points)             DISEASE RELATED RISK FACTORS                                                       [ ] Edema in the lower extremities                 (1 Point)                     [ ] Varicose veins                                               (1 Point)                                 [ ] BMI > 25 Kg/m2                                            (1 Point)                                  [ ] Serious infection (ie PNA)                            (1 Point)                     [ ] Lung disease ( COPD, Emphysema)            (1 Point)                                                                          [ ] Acute myocardial infarction                         (1 Point)                  [ ] Congestive heart failure (in the previous month)  (1 Point)         [ ] Inflammatory bowel disease                            (1 Point)                  [ ] Central venous access, PICC or Port               (2 points)       (within the last month)                                                                [ ] Stroke (in the previous month)                        (5 Points)    [ ] Previous or present malignancy                       (2 points)                                                                                                                                                         HEMATOLOGY RELATED FACTORS                                                         [ ] Prior episodes of VTE                                     (3 Points)                     [ ] Positive family history for VTE                      (3 Points)                  [ ] Prothrombin 59439 A                                     (3 Points)                     [ ] Factor V Leiden                                                (3 Points)                        [ ] Lupus anticoagulants                                      (3 Points)                                                           [ ] Anticardiolipin antibodies                              (3 Points)                                                       [ ] High homocysteine in the blood                   (3 Points)                                             [ ] Other congenital or acquired thrombophilia      (3 Points)                                                [ ] Heparin induced thrombocytopenia                  (3 Points)                                        MOBILITY RELATED FACTORS  [ ] Bed rest                                                         (1 Point)  [ ] Plaster cast                                                    (2 points)  [ ] Bed bound for more than 72 hours           (2 Points)    GENDER SPECIFIC FACTORS  [ ] Pregnancy or had a baby within the last month   (1 Point)  [ ] Post-partum < 6 weeks                                   (1 Point)  [ ] Hormonal therapy  or oral contraception   (1 Point)  [ ] History of pregnancy complications              (1 point)  [ ] Unexplained or recurrent              (1 Point)    OTHER RISK FACTORS                                           (1 Point)  [ ] BMI >40, smoking, diabetes requiring insulin, chemotherapy  blood transfusions and length of surgery over 2 hours    SURGERY RELATED RISK FACTORS  [ ]  Section within the last month     (1 Point)  [ ] Minor surgery                                                  (1 Point)  [ ] Arthroscopic surgery                                       (2 Points)  [ ] Planned major surgery lasting more            (2 Points)      than 45 minutes     [ ] Elective hip or knee joint replacement       (5 points)       surgery                                                TRAUMA RELATED RISK FACTORS  [ ] Fracture of the hip, pelvis, or leg                       (5 Points)  [ ] Spinal cord injury resulting in paralysis             (5 points)       (in the previous month)    [ ] Paralysis  (less than 1 month)                             (5 Points)  [ ] Multiple Trauma within 1 month                        (5 Points)    Total Score [        ]    Caprini Score 0-2: Low Risk, NO VTE prophylaxis required for most patients, encourage ambulation  Caprini Score 3-6: Moderate Risk , pharmacologic VTE prophylaxis is indicated for most patients (in the absence of contraindications)  Caprini Score Greater than or =7: High risk, pharmocologic VTE prophylaxis indicated for most patients (in the absence of contraindications)      OPIOID RISK TOOL    JONATHAN EACH BOX THAT APPLIES AND ADD TOTALS AT THE END    FAMILY HISTORY OF SUBSTANCE ABUSE                 FEMALE         MALE                                                Alcohol                             [  ]1 pt          [  ]3pts                                               Illegal Durgs                     [  ]2 pts        [  ]3pts                                               Rx Drugs                           [  ]4 pts        [  ]4 pts    PERSONAL HISTORY OF SUBSTANCE ABUSE                                                                                          Alcohol                             [  ]3 pts       [  ]3 pts                                               Illegal Drugs                     [  ]4 pts        [  ]4 pts                                               Rx Drugs                           [  ]5 pts        [  ]5 pts    AGE BETWEEN 16-45 YEARS                                      [  ]1 pt         [  ]1 pt    HISTORY OF PREADOLESCENT   SEXUAL ABUSE                                                             [  ]3 pts        [  ]0pts    PSYCHOLOGICAL DISEASE                     ADD, OCD, Bipolar, Schizophrenia        [  ]2 pts         [  ]2 pts                      Depression                                               [  ]1 pt           [  ]1 pt           SCORING TOTAL   (add numbers and type here)              (***)                                     A score of 3 or lower indicated LOW risk for future opioid abuse  A score of 4 to 7 indicated moderate risk for future opioid abuse  A score of 8 or higher indicates a high risk for opioid abuse                             This is a pleasant  and frustrated 59 year old  female presenting today for PST, PMH includes OA. Patient c/o right knee pain and is s/p revision of right total knee replacement on 24.  States she was advised to stopped PT 6 weeks ago and now states she has "torn tendons around her patella" She had initial joint replacement  2024. States since replacement she has had "multiple issues". Reports on 8/10/24 she had wash out of joint due to infection and surgery to remove parts 24, she had PICC placed and was treated with IV antibiotics x 6 weeks. States in more pain now then before initial surgery. Describes pain as constant, pulling.  Current pain level 7/10 and max pain level 10/10. Reports swelling and buckling of right knee, denies falls or use of cane. Patient is frustrated due to pain and inability to "get around", especially since she had revision surgery in December.  Patient  states she lost her job because shes been out for so long and has her son's wedding coming up. On 3/21/25 she had "100cc" of fluid  removed from right knee.  She states she has followed all directions in post-op care and exercises as  per PT instructions. MRI of right knee performed on 3/24/25 demonstrated : Full-thickness retracted tear of the vastus intermedialis and rectus femoris tendons with retraction of at least 6 cm is beyond the field of view. Partial tearing and degeneration of the vastus lateralis and medialis as well as the medial and lateral retinacular structures without high-grade tearing or detachment. Very large hemorrhagic joint effusion extends anteriorly through the defect into the quadriceps into the prepatellar region. She is beyond frustrated and just wants to "get back to normal".  She is now scheduled for right medial retinacular repair  with Dr. Baptiste pending on 3/31/25 pending medical optimization.  Cardiology note and Echo to be obtained.        Labs, EKG and MRSA/MSSA performed. Written and verbal instructions provided. Patient educated on surgical scrub, preadmission instructions, clearance and day of procedure medications, verbalizes understanding.  Patient instructed to stop vitamins/supplements/herbal medications/ASA/NSAIDS for one week prior to surgery and discuss with PMD, verbalized understanding.  Patient verbalized understanding of instructions and was given the opportunity to ask questions. Out patient medications reviewed and verified with patient.            CAPRINI SCORE    AGE RELATED RISK FACTORS                                                             [X ] Age 41-60 years                                            (1 Point)  [ ] Age: 61-74 years                                           (2 Points)                 [ ] Age= 75 years                                                (3 Points)             DISEASE RELATED RISK FACTORS                                                       [ X] Edema in the lower extremities                 (1 Point)                     [ X] Varicose veins                                               (1 Point)                                 [ X] BMI > 25 Kg/m2                                            (1 Point)                                  [ ] Serious infection (ie PNA)                            (1 Point)                     [ ] Lung disease ( COPD, Emphysema)            (1 Point)                                                                          [ ] Acute myocardial infarction                         (1 Point)                  [ ] Congestive heart failure (in the previous month)  (1 Point)         [ ] Inflammatory bowel disease                            (1 Point)                  [ ] Central venous access, PICC or Port               (2 points)       (within the last month)                                                                [ ] Stroke (in the previous month)                        (5 Points)    [ ] Previous or present malignancy                       (2 points)                                                                                                                                                         HEMATOLOGY RELATED FACTORS                                                         [ ] Prior episodes of VTE                                     (3 Points)                     [ ] Positive family history for VTE                      (3 Points)                  [ ] Prothrombin 90052 A                                     (3 Points)                     [ ] Factor V Leiden                                                (3 Points)                        [ ] Lupus anticoagulants                                      (3 Points)                                                           [ ] Anticardiolipin antibodies                              (3 Points)                                                       [ ] High homocysteine in the blood                   (3 Points)                                             [ ] Other congenital or acquired thrombophilia      (3 Points)                                                [ ] Heparin induced thrombocytopenia                  (3 Points)                                        MOBILITY RELATED FACTORS  [ ] Bed rest                                                         (1 Point)  [ ] Plaster cast                                                    (2 points)  [ ] Bed bound for more than 72 hours           (2 Points)    GENDER SPECIFIC FACTORS  [ ] Pregnancy or had a baby within the last month   (1 Point)  [ ] Post-partum < 6 weeks                                   (1 Point)  [ ] Hormonal therapy  or oral contraception   (1 Point)  [ ] History of pregnancy complications              (1 point)  [ ] Unexplained or recurrent              (1 Point)    OTHER RISK FACTORS                                           (1 Point)  [ ] BMI >40, smoking, diabetes requiring insulin, chemotherapy  blood transfusions and length of surgery over 2 hours    SURGERY RELATED RISK FACTORS  [ ]  Section within the last month     (1 Point)  [ ] Minor surgery                                                  (1 Point)  [ ] Arthroscopic surgery                                       (2 Points)  [X ] Planned major surgery lasting more            (2 Points)      than 45 minutes     [ ] Elective hip or knee joint replacement       (5 points)       surgery                                                TRAUMA RELATED RISK FACTORS  [ ] Fracture of the hip, pelvis, or leg                       (5 Points)  [ ] Spinal cord injury resulting in paralysis             (5 points)       (in the previous month)    [ ] Paralysis  (less than 1 month)                             (5 Points)  [ ] Multiple Trauma within 1 month                        (5 Points)    Total Score [    6    ]    Caprini Score 0-2: Low Risk, NO VTE prophylaxis required for most patients, encourage ambulation  Caprini Score 3-6: Moderate Risk , pharmacologic VTE prophylaxis is indicated for most patients (in the absence of contraindications)  Caprini Score Greater than or =7: High risk, pharmocologic VTE prophylaxis indicated for most patients (in the absence of contraindications)      OPIOID RISK TOOL    JONATHAN EACH BOX THAT APPLIES AND ADD TOTALS AT THE END    FAMILY HISTORY OF SUBSTANCE ABUSE                 FEMALE         MALE                                                Alcohol                             [  ]1 pt          [  ]3pts                                               Illegal Durgs                     [  ]2 pts        [  ]3pts                                               Rx Drugs                           [  ]4 pts        [  ]4 pts    PERSONAL HISTORY OF SUBSTANCE ABUSE                                                                                          Alcohol                             [  ]3 pts       [  ]3 pts                                               Illegal Drugs                     [  ]4 pts        [  ]4 pts                                               Rx Drugs                           [  ]5 pts        [  ]5 pts    AGE BETWEEN 16-45 YEARS                                      [  ]1 pt         [  ]1 pt    HISTORY OF PREADOLESCENT   SEXUAL ABUSE                                                             [  ]3 pts        [  ]0pts    PSYCHOLOGICAL DISEASE                     ADD, OCD, Bipolar, Schizophrenia        [  ]2 pts         [  ]2 pts                      Depression                                               [  ]1 pt           [  ]1 pt           SCORING TOTAL   (add numbers and type here)              (**0*)                                     A score of 3 or lower indicated LOW risk for future opioid abuse  A score of 4 to 7 indicated moderate risk for future opioid abuse  A score of 8 or higher indicates a high risk for opioid abuse

## 2025-03-28 PROBLEM — M19.90 UNSPECIFIED OSTEOARTHRITIS, UNSPECIFIED SITE: Chronic | Status: ACTIVE | Noted: 2025-03-27

## 2025-03-31 ENCOUNTER — APPOINTMENT (OUTPATIENT)
Dept: ORTHOPEDIC SURGERY | Facility: HOSPITAL | Age: 60
End: 2025-03-31

## 2025-03-31 ENCOUNTER — OUTPATIENT (OUTPATIENT)
Dept: OUTPATIENT SERVICES | Facility: HOSPITAL | Age: 60
LOS: 1 days | End: 2025-03-31
Payer: COMMERCIAL

## 2025-03-31 ENCOUNTER — TRANSCRIPTION ENCOUNTER (OUTPATIENT)
Age: 60
End: 2025-03-31

## 2025-03-31 DIAGNOSIS — Z90.49 ACQUIRED ABSENCE OF OTHER SPECIFIED PARTS OF DIGESTIVE TRACT: Chronic | ICD-10-CM

## 2025-03-31 DIAGNOSIS — Z98.890 OTHER SPECIFIED POSTPROCEDURAL STATES: Chronic | ICD-10-CM

## 2025-03-31 PROCEDURE — 27334 REMOVE KNEE JOINT LINING: CPT | Mod: RT

## 2025-03-31 PROCEDURE — 27422 REVISION OF UNSTABLE KNEECAP: CPT | Mod: AS,RT

## 2025-03-31 PROCEDURE — C9399: CPT

## 2025-04-02 DIAGNOSIS — T84.02: ICD-10-CM

## 2025-04-08 ENCOUNTER — APPOINTMENT (OUTPATIENT)
Dept: ORTHOPEDIC SURGERY | Facility: CLINIC | Age: 60
End: 2025-04-08
Payer: COMMERCIAL

## 2025-04-08 VITALS — BODY MASS INDEX: 31.32 KG/M2 | WEIGHT: 188 LBS | HEIGHT: 65 IN

## 2025-04-08 PROCEDURE — 99213 OFFICE O/P EST LOW 20 MIN: CPT

## 2025-04-23 ENCOUNTER — APPOINTMENT (OUTPATIENT)
Dept: ORTHOPEDIC SURGERY | Facility: CLINIC | Age: 60
End: 2025-04-23
Payer: COMMERCIAL

## 2025-04-23 VITALS — HEIGHT: 65 IN | WEIGHT: 188 LBS | BODY MASS INDEX: 31.32 KG/M2

## 2025-04-23 DIAGNOSIS — Z47.1 AFTERCARE FOLLOWING JOINT REPLACEMENT SURGERY: ICD-10-CM

## 2025-04-23 DIAGNOSIS — Z96.651 PRESENCE OF RIGHT ARTIFICIAL KNEE JOINT: ICD-10-CM

## 2025-04-23 PROCEDURE — 99024 POSTOP FOLLOW-UP VISIT: CPT

## 2025-04-23 PROCEDURE — 73562 X-RAY EXAM OF KNEE 3: CPT | Mod: 26,RT

## 2025-05-16 ENCOUNTER — APPOINTMENT (OUTPATIENT)
Dept: ORTHOPEDIC SURGERY | Facility: CLINIC | Age: 60
End: 2025-05-16
Payer: COMMERCIAL

## 2025-05-16 VITALS — BODY MASS INDEX: 31.32 KG/M2 | HEIGHT: 65 IN | WEIGHT: 188 LBS

## 2025-05-16 DIAGNOSIS — Z47.1 AFTERCARE FOLLOWING JOINT REPLACEMENT SURGERY: ICD-10-CM

## 2025-05-16 DIAGNOSIS — Z96.651 PRESENCE OF RIGHT ARTIFICIAL KNEE JOINT: ICD-10-CM

## 2025-05-16 PROCEDURE — 99024 POSTOP FOLLOW-UP VISIT: CPT

## 2025-05-16 PROCEDURE — 73562 X-RAY EXAM OF KNEE 3: CPT | Mod: RT

## 2025-06-12 ENCOUNTER — APPOINTMENT (OUTPATIENT)
Dept: ORTHOPEDIC SURGERY | Facility: CLINIC | Age: 60
End: 2025-06-12

## 2025-06-24 ENCOUNTER — APPOINTMENT (OUTPATIENT)
Dept: ORTHOPEDIC SURGERY | Facility: CLINIC | Age: 60
End: 2025-06-24
Payer: COMMERCIAL

## 2025-06-24 VITALS
BODY MASS INDEX: 31.32 KG/M2 | HEART RATE: 73 BPM | OXYGEN SATURATION: 99 % | WEIGHT: 188 LBS | HEIGHT: 65 IN | SYSTOLIC BLOOD PRESSURE: 118 MMHG | DIASTOLIC BLOOD PRESSURE: 80 MMHG

## 2025-06-24 PROCEDURE — 99213 OFFICE O/P EST LOW 20 MIN: CPT | Mod: 24

## 2025-06-24 PROCEDURE — 73562 X-RAY EXAM OF KNEE 3: CPT | Mod: RT

## 2025-06-24 PROCEDURE — G2211 COMPLEX E/M VISIT ADD ON: CPT | Mod: NC

## 2025-09-05 ENCOUNTER — LABORATORY RESULT (OUTPATIENT)
Age: 60
End: 2025-09-05

## 2025-09-05 ENCOUNTER — APPOINTMENT (OUTPATIENT)
Dept: ORTHOPEDIC SURGERY | Facility: CLINIC | Age: 60
End: 2025-09-05

## 2025-09-05 VITALS
WEIGHT: 188 LBS | SYSTOLIC BLOOD PRESSURE: 125 MMHG | DIASTOLIC BLOOD PRESSURE: 78 MMHG | BODY MASS INDEX: 31.32 KG/M2 | HEIGHT: 65 IN

## 2025-09-05 DIAGNOSIS — Z96.651 PRESENCE OF RIGHT ARTIFICIAL KNEE JOINT: ICD-10-CM

## 2025-09-05 PROCEDURE — 73502 X-RAY EXAM HIP UNI 2-3 VIEWS: CPT

## 2025-09-05 PROCEDURE — 99214 OFFICE O/P EST MOD 30 MIN: CPT | Mod: 25

## 2025-09-05 PROCEDURE — 20610 DRAIN/INJ JOINT/BURSA W/O US: CPT | Mod: RT

## 2025-09-09 LAB
B PERT IGG+IGM PNL SER: ABNORMAL
COLOR FLD: NORMAL
FLUID INTAKE SUBSTANCE CLASS: NORMAL
JOINT PCR PANEL: NOT DETECTED
JOINT PCR SOURCE: NORMAL
LYMPHOCYTES # FLD MANUAL: 12 %
MONOS+MACROS NFR FLD MANUAL: 17 %
NEUTS SEG # FLD MANUAL: 71 %
RBC # FLD MANUAL: NORMAL CELLS/UL
TOTAL CELLS COUNTED FLD: 3156 CELLS/UL
TUBE TYPE: NORMAL
WBC COUNT: 3141 CELLS/UL

## 2025-09-24 PROBLEM — M25.461 EFFUSION OF RIGHT KNEE: Status: ACTIVE | Noted: 2025-09-24

## 2025-09-24 PROBLEM — Z47.1 ORTHOPEDIC AFTERCARE FOR JOINT REPLACEMENT: Status: ACTIVE | Noted: 2025-02-05

## (undated) DEVICE — TOURNIQUET CUFF 34" DUAL PORT W PLC W SLEEVE (BLACK)

## (undated) DEVICE — POLY TRAP ETRAP

## (undated) DEVICE — SUT VICRYL 0 18" CT-1 UNDYED (POP-OFF)

## (undated) DEVICE — DRAPE 1/2 SHEET 40X57"

## (undated) DEVICE — ZIMMER CEMENT MIXING SYSTEM CLEARMIX SINGLE/DOUBLE

## (undated) DEVICE — SPECIMEN CONTAINER PET

## (undated) DEVICE — DRAPE 3/4 SHEET 52X76"

## (undated) DEVICE — SOL IRR POUR H2O 1000ML

## (undated) DEVICE — SUT VICRYL PLUS 0 18" CT-1 UNDYED (POP-OFF)

## (undated) DEVICE — DRSG COBAN 6"

## (undated) DEVICE — KIT ENDO PROCEDURE CUST W/VLV

## (undated) DEVICE — SUT MONOCRYL 3-0 27" PS-2 UNDYED

## (undated) DEVICE — GLV 8 PROTEXIS (BLUE)

## (undated) DEVICE — SOL IRR BAG NS 0.9% 3000ML

## (undated) DEVICE — ZIMMER CEMENT MIXING SYSTEM COMPACT VACUUM

## (undated) DEVICE — PACK TOTAL KNEE

## (undated) DEVICE — VENODYNE/SCD SLEEVE CALF MEDIUM

## (undated) DEVICE — SUT FIBERWIRE #2 38" STRAND 1 BLUE T-5 TAPER

## (undated) DEVICE — GLV COTTON LG STERILE

## (undated) DEVICE — SAW BLADE STRYKER SAGITTAL DUAL CUT 75X18X1.27MM

## (undated) DEVICE — ZIMMER PULSAVAC PLUS FAN KIT

## (undated) DEVICE — WOUND IRR SURGIPHOR

## (undated) DEVICE — DRSG DERMABOND 0.7ML

## (undated) DEVICE — GLV 8.5 PROTEXIS (WHITE)

## (undated) DEVICE — SUT VICRYL 2-0 27" CT-2 UNDYED

## (undated) DEVICE — SAW BLADE STRYKER SAGITTAL 10MM LONG MEDIUM

## (undated) DEVICE — SOL IRR POUR NS 0.9% 1000ML

## (undated) DEVICE — CONTAINER FORMALIN BUFF 10% 60ML

## (undated) DEVICE — MIDAS REX MR8 BALL FLUTED SM BORE 5MM X 10CM

## (undated) DEVICE — TOURNIQUET ESMARK 6"

## (undated) DEVICE — DRSG WEBRIL 6"

## (undated) DEVICE — SYR LUER LOK 20CC

## (undated) DEVICE — SNARE EXACTO COLD 9MMX230CM

## (undated) DEVICE — HOOD FLYTE STRYKER SURGICOOL W PEELAWAY

## (undated) DEVICE — BRACE KNEE IMMOBILIZER SPLINT SUPER MEDIUM 24"

## (undated) DEVICE — ZIMMER BACTISURE WOUND LAVAGE 1000ML

## (undated) DEVICE — GLV 8 PROTEXIS ORTHO (BROWN)

## (undated) DEVICE — ZIMMER BLADE MRI OSTEOTOME 8MM X 3"

## (undated) DEVICE — DRSG MEPILEX 10 X 25CM (4 X 10") AG

## (undated) DEVICE — DRAPE TOWEL BLUE 17" X 24"

## (undated) DEVICE — SAW BLADE ZIMMER RECIPROCATING SINGLE SIDED 10X70X1.19MM

## (undated) DEVICE — ELCTR STRYKER NEPTUNE SMOKE EVACUATION PENCIL (GREEN)

## (undated) DEVICE — PLATE NESSY 170

## (undated) DEVICE — DRSG PREVENA PEEL & PLACE KIT 20CM

## (undated) DEVICE — NDL HYPO SAFE 18G X 1.5" (PINK)

## (undated) DEVICE — HOOD T7 NON-PEELAWAY

## (undated) DEVICE — DRAPE XL SHEET 77X98"

## (undated) DEVICE — SUT VICRYL PLUS 2-0 27" CP-1 UNDYED

## (undated) DEVICE — SNARE POLYP SENS SM 13MM 240CM

## (undated) DEVICE — FOLEY TRAY 16FR 5CC LF UMETER CLOSED

## (undated) DEVICE — SAW BLADE ZIMMER RECIPROCATING DOUBLE SIDED 12.5X96X1.19MM

## (undated) DEVICE — ZIMMER COMPACT VACUUM CEMENT MIXING SYSTEM

## (undated) DEVICE — PREP CHLORAPREP HI-LITE ORANGE 26ML

## (undated) DEVICE — SUT VICRYL 0 27" CP-1 UNDYED

## (undated) DEVICE — DRAPE MAYO STAND 23"

## (undated) DEVICE — TUBING CAP SET ERBEFLO CLEVERCAP HYBRID CO2 FOR OLYMPUS SCOPES AND UCR

## (undated) DEVICE — FORCEP RADIAL JAW 4 240CM DISP

## (undated) DEVICE — DRSG ACE BANDAGE 6"

## (undated) DEVICE — WARMING BLANKET UPPER ADULT

## (undated) DEVICE — FRAZIER SUCTION TIP 10FR

## (undated) DEVICE — SAW BLADE ZIMMER FOR STRYKER OSCILLATING 90X19X1.27MM

## (undated) DEVICE — SAW BLADE SYNVASIVE OSCILLATING

## (undated) DEVICE — ELCTR GROUNDING PAD ADULT COVIDIEN

## (undated) DEVICE — MIDAS REX MR8 MATCH HEAD FLUTED SM BORE 3MM X 10CM

## (undated) DEVICE — ENDOCUFF VISION SZ 2 LG GRN

## (undated) DEVICE — MIDAS REX MR8 BALL FLUTED SM BORE 2MM X 10CM

## (undated) DEVICE — PACK EXTREMITY